# Patient Record
Sex: FEMALE | Race: WHITE | NOT HISPANIC OR LATINO | Employment: UNEMPLOYED | URBAN - NONMETROPOLITAN AREA
[De-identification: names, ages, dates, MRNs, and addresses within clinical notes are randomized per-mention and may not be internally consistent; named-entity substitution may affect disease eponyms.]

---

## 2024-02-23 PROBLEM — K90.9 IRON MALABSORPTION: Status: ACTIVE | Noted: 2024-02-23

## 2024-02-23 PROBLEM — D50.8 IRON DEFICIENCY ANEMIA SECONDARY TO INADEQUATE DIETARY IRON INTAKE: Status: ACTIVE | Noted: 2024-02-23

## 2024-02-23 PROBLEM — D50.8 IRON DEFICIENCY ANEMIA SECONDARY TO INADEQUATE DIETARY IRON INTAKE: Status: ACTIVE | Noted: 2023-12-08

## 2024-02-26 ENCOUNTER — TELEPHONE (OUTPATIENT)
Dept: CARDIOLOGY | Facility: CLINIC | Age: 44
End: 2024-02-26
Payer: COMMERCIAL

## 2024-02-26 NOTE — TELEPHONE ENCOUNTER
----- Message from BENY Ambrose sent at 2/26/2024  3:32 PM EST -----  Please let her know there was NO significant coronary artery disease noted to explain her complaints of chest pain. Here calcium score was also low. Additionally there was no major abnormality noted within her lungs. Her chest pain complaints should be evaluated by family doctor for another cause.

## 2024-02-27 RX ORDER — SODIUM PICOSULFATE, MAGNESIUM OXIDE, AND ANHYDROUS CITRIC ACID 10; 3.5; 12 MG/160ML; G/160ML; G/160ML
350 LIQUID ORAL TAKE AS DIRECTED
Qty: 350 ML | Refills: 0 | Status: SHIPPED | OUTPATIENT
Start: 2024-02-27

## 2024-02-27 NOTE — TELEPHONE ENCOUNTER
Caller: Gina Tiwari    Relationship: Self    Best call back number: [phone]    Requested Prescriptions:   Requested Prescriptions     Pending Prescriptions Disp Refills    norethindrone (AYGESTIN) 5 MG tablet       Sig: Take 1 tablet by mouth Daily.        Pharmacy where request should be sent: [facility] [address] [place] [address] [phone] [facility] [phone]      Last office visit with prescribing clinician: 11/20/2023   Last telemedicine visit with prescribing clinician: 9/5/2023   Next office visit with prescribing clinician: 3/21/2024     Additional details provided by patient: PT NEEDS 1 30 DAY REFILL CALLED INTO PHARMACY. HER NEW OBGYN WILL REFILL THEM BUT HER APPT IS NOT UNTIL 3/4/2024. SHE WILL RUN OUT BEFORE THEN AND WAS WANTING TO SEE IF YOU WOULD GET HER 1 30 DAY REFILL    Does the patient have less than a 3 day supply:  [x] Yes  [] No    Would you like a call back once the refill request has been completed: [x] Yes [] No    If the office needs to give you a call back, can they leave a voicemail: [] Yes [] No    Galina Burger Rep   02/27/24 15:41 EST

## 2024-03-01 ENCOUNTER — HOSPITAL ENCOUNTER (OUTPATIENT)
Dept: ONCOLOGY | Facility: HOSPITAL | Age: 44
Discharge: HOME | End: 2024-03-01
Payer: COMMERCIAL

## 2024-03-01 ENCOUNTER — PRE-ADMISSION TESTING (OUTPATIENT)
Dept: PREADMISSION TESTING | Facility: HOSPITAL | Age: 44
End: 2024-03-01
Payer: COMMERCIAL

## 2024-03-01 VITALS — HEIGHT: 64 IN | WEIGHT: 293 LBS | BODY MASS INDEX: 50.02 KG/M2

## 2024-03-01 VITALS
SYSTOLIC BLOOD PRESSURE: 112 MMHG | HEART RATE: 91 BPM | DIASTOLIC BLOOD PRESSURE: 55 MMHG | BODY MASS INDEX: 50.02 KG/M2 | WEIGHT: 293 LBS | TEMPERATURE: 97.2 F | HEIGHT: 64 IN | RESPIRATION RATE: 16 BRPM

## 2024-03-01 DIAGNOSIS — D50.8 IRON DEFICIENCY ANEMIA SECONDARY TO INADEQUATE DIETARY IRON INTAKE: ICD-10-CM

## 2024-03-01 DIAGNOSIS — K90.9 IRON MALABSORPTION: Primary | ICD-10-CM

## 2024-03-01 LAB
DEPRECATED RDW RBC AUTO: 55.3 FL (ref 37–54)
ERYTHROCYTE [DISTWIDTH] IN BLOOD BY AUTOMATED COUNT: 19 % (ref 12.3–15.4)
HCT VFR BLD AUTO: 35.6 % (ref 34–46.6)
HGB BLD-MCNC: 11.1 G/DL (ref 12–15.9)
MCH RBC QN AUTO: 25.1 PG (ref 26.6–33)
MCHC RBC AUTO-ENTMCNC: 31.2 G/DL (ref 31.5–35.7)
MCV RBC AUTO: 80.5 FL (ref 79–97)
PLATELET # BLD AUTO: 381 10*3/MM3 (ref 140–450)
PMV BLD AUTO: 8.7 FL (ref 6–12)
POTASSIUM SERPL-SCNC: 3.8 MMOL/L (ref 3.5–5.2)
RBC # BLD AUTO: 4.42 10*6/MM3 (ref 3.77–5.28)
WBC NRBC COR # BLD AUTO: 8.95 10*3/MM3 (ref 3.4–10.8)

## 2024-03-01 PROCEDURE — 96374 THER/PROPH/DIAG INJ IV PUSH: CPT

## 2024-03-01 PROCEDURE — 25810000003 SODIUM CHLORIDE 0.9 % SOLUTION: Performed by: INTERNAL MEDICINE

## 2024-03-01 PROCEDURE — 85027 COMPLETE CBC AUTOMATED: CPT

## 2024-03-01 PROCEDURE — 25010000002 IRON SUCROSE PER 1 MG: Performed by: INTERNAL MEDICINE

## 2024-03-01 PROCEDURE — 36415 COLL VENOUS BLD VENIPUNCTURE: CPT

## 2024-03-01 PROCEDURE — 84132 ASSAY OF SERUM POTASSIUM: CPT

## 2024-03-01 RX ORDER — SODIUM CHLORIDE 9 MG/ML
20 INJECTION, SOLUTION INTRAVENOUS ONCE
Status: COMPLETED | OUTPATIENT
Start: 2024-03-01 | End: 2024-03-01

## 2024-03-01 RX ADMIN — SODIUM CHLORIDE 20 ML/HR: 9 INJECTION, SOLUTION INTRAVENOUS at 13:05

## 2024-03-01 RX ADMIN — IRON SUCROSE 200 MG: 20 INJECTION, SOLUTION INTRAVENOUS at 13:05

## 2024-03-01 NOTE — PAT
Patient did not review general PAT education video as instructed in their preoperative information received from their surgeon.  One-on-one Pre Admission Testing general education provided during PAT visit.  Copies of PAT general education handouts (Incentive Spirometry, Meds to Beds Program, Patient Belongings, Pre-op skin preparation instructions, Blood Glucose testing, Visitor policy, Surgery FAQ, Code H) distributed to patient. Encouraged patient/family to read PAT general education handouts thoroughly and notify PAT staff with any questions or concerns. Patient instructed to bring PAT pass and completed skin prep sheet (if applicable) on the day of procedure. Patient verbalized understanding of all information and priority content.     Per Anesthesia Request, patient instructed not to take their ACE/ARB medications on the AM of surgery.    An arrival time for procedure was not provided during PAT visit. If patient had any questions or concerns about their arrival time, they were instructed to contact their surgeon/physician.  Additionally, if the patient referred to an arrival time that was acquired from their my chart account, patient was encouraged to verify that time with their surgeon/physician. Arrival times are NOT provided in Pre Admission Testing Department.

## 2024-03-04 ENCOUNTER — ANESTHESIA EVENT (OUTPATIENT)
Dept: GASTROENTEROLOGY | Facility: HOSPITAL | Age: 44
End: 2024-03-04
Payer: COMMERCIAL

## 2024-03-05 ENCOUNTER — HOSPITAL ENCOUNTER (OUTPATIENT)
Facility: HOSPITAL | Age: 44
Setting detail: HOSPITAL OUTPATIENT SURGERY
Discharge: HOME | End: 2024-03-05
Attending: INTERNAL MEDICINE | Admitting: INTERNAL MEDICINE
Payer: COMMERCIAL

## 2024-03-05 ENCOUNTER — ANESTHESIA (OUTPATIENT)
Dept: GASTROENTEROLOGY | Facility: HOSPITAL | Age: 44
End: 2024-03-05
Payer: COMMERCIAL

## 2024-03-05 VITALS
DIASTOLIC BLOOD PRESSURE: 63 MMHG | WEIGHT: 293 LBS | RESPIRATION RATE: 20 BRPM | BODY MASS INDEX: 50.02 KG/M2 | TEMPERATURE: 97.2 F | HEIGHT: 64 IN | OXYGEN SATURATION: 96 % | HEART RATE: 80 BPM | SYSTOLIC BLOOD PRESSURE: 116 MMHG

## 2024-03-05 DIAGNOSIS — D50.9 IRON DEFICIENCY ANEMIA, UNSPECIFIED IRON DEFICIENCY ANEMIA TYPE: ICD-10-CM

## 2024-03-05 LAB — GLUCOSE BLDC GLUCOMTR-MCNC: 121 MG/DL (ref 70–130)

## 2024-03-05 PROCEDURE — 82948 REAGENT STRIP/BLOOD GLUCOSE: CPT

## 2024-03-05 PROCEDURE — 25010000002 PROPOFOL 10 MG/ML EMULSION: Performed by: NURSE ANESTHETIST, CERTIFIED REGISTERED

## 2024-03-05 PROCEDURE — 88305 TISSUE EXAM BY PATHOLOGIST: CPT | Performed by: INTERNAL MEDICINE

## 2024-03-05 PROCEDURE — 25810000003 LACTATED RINGERS PER 1000 ML: Performed by: ANESTHESIOLOGY

## 2024-03-05 RX ORDER — SODIUM CHLORIDE, SODIUM LACTATE, POTASSIUM CHLORIDE, CALCIUM CHLORIDE 600; 310; 30; 20 MG/100ML; MG/100ML; MG/100ML; MG/100ML
9 INJECTION, SOLUTION INTRAVENOUS CONTINUOUS
Status: DISCONTINUED | OUTPATIENT
Start: 2024-03-05 | End: 2024-03-05 | Stop reason: HOSPADM

## 2024-03-05 RX ORDER — FAMOTIDINE 10 MG/ML
20 INJECTION, SOLUTION INTRAVENOUS ONCE
Status: COMPLETED | OUTPATIENT
Start: 2024-03-05 | End: 2024-03-05

## 2024-03-05 RX ORDER — FENTANYL CITRATE 50 UG/ML
50 INJECTION, SOLUTION INTRAMUSCULAR; INTRAVENOUS
Status: DISCONTINUED | OUTPATIENT
Start: 2024-03-05 | End: 2024-03-05 | Stop reason: HOSPADM

## 2024-03-05 RX ORDER — FAMOTIDINE 20 MG/1
20 TABLET, FILM COATED ORAL ONCE
Status: DISCONTINUED | OUTPATIENT
Start: 2024-03-05 | End: 2024-03-05 | Stop reason: HOSPADM

## 2024-03-05 RX ORDER — SODIUM CHLORIDE 9 MG/ML
40 INJECTION, SOLUTION INTRAVENOUS AS NEEDED
Status: DISCONTINUED | OUTPATIENT
Start: 2024-03-05 | End: 2024-03-05 | Stop reason: HOSPADM

## 2024-03-05 RX ORDER — PROPOFOL 10 MG/ML
VIAL (ML) INTRAVENOUS AS NEEDED
Status: DISCONTINUED | OUTPATIENT
Start: 2024-03-05 | End: 2024-03-05 | Stop reason: SURG

## 2024-03-05 RX ORDER — LIDOCAINE HYDROCHLORIDE 10 MG/ML
INJECTION, SOLUTION EPIDURAL; INFILTRATION; INTRACAUDAL; PERINEURAL AS NEEDED
Status: DISCONTINUED | OUTPATIENT
Start: 2024-03-05 | End: 2024-03-05 | Stop reason: SURG

## 2024-03-05 RX ORDER — LIDOCAINE HYDROCHLORIDE 10 MG/ML
0.5 INJECTION, SOLUTION EPIDURAL; INFILTRATION; INTRACAUDAL; PERINEURAL ONCE AS NEEDED
Status: DISCONTINUED | OUTPATIENT
Start: 2024-03-05 | End: 2024-03-05 | Stop reason: HOSPADM

## 2024-03-05 RX ORDER — IPRATROPIUM BROMIDE AND ALBUTEROL SULFATE 2.5; .5 MG/3ML; MG/3ML
3 SOLUTION RESPIRATORY (INHALATION) ONCE AS NEEDED
Status: DISCONTINUED | OUTPATIENT
Start: 2024-03-05 | End: 2024-03-05 | Stop reason: HOSPADM

## 2024-03-05 RX ORDER — ONDANSETRON 2 MG/ML
4 INJECTION INTRAMUSCULAR; INTRAVENOUS ONCE AS NEEDED
Status: DISCONTINUED | OUTPATIENT
Start: 2024-03-05 | End: 2024-03-05 | Stop reason: HOSPADM

## 2024-03-05 RX ORDER — FENTANYL CITRATE 50 UG/ML
50 INJECTION, SOLUTION INTRAMUSCULAR; INTRAVENOUS
Status: DISCONTINUED | OUTPATIENT
Start: 2024-03-05 | End: 2024-03-05 | Stop reason: SDUPTHER

## 2024-03-05 RX ORDER — SODIUM CHLORIDE 0.9 % (FLUSH) 0.9 %
10 SYRINGE (ML) INJECTION AS NEEDED
Status: DISCONTINUED | OUTPATIENT
Start: 2024-03-05 | End: 2024-03-05 | Stop reason: HOSPADM

## 2024-03-05 RX ORDER — SODIUM CHLORIDE 0.9 % (FLUSH) 0.9 %
10 SYRINGE (ML) INJECTION EVERY 12 HOURS SCHEDULED
Status: DISCONTINUED | OUTPATIENT
Start: 2024-03-05 | End: 2024-03-05 | Stop reason: HOSPADM

## 2024-03-05 RX ORDER — MIDAZOLAM HYDROCHLORIDE 1 MG/ML
1 INJECTION INTRAMUSCULAR; INTRAVENOUS
Status: DISCONTINUED | OUTPATIENT
Start: 2024-03-05 | End: 2024-03-05 | Stop reason: HOSPADM

## 2024-03-05 RX ORDER — HYDROMORPHONE HYDROCHLORIDE 1 MG/ML
0.5 INJECTION, SOLUTION INTRAMUSCULAR; INTRAVENOUS; SUBCUTANEOUS
Status: DISCONTINUED | OUTPATIENT
Start: 2024-03-05 | End: 2024-03-05 | Stop reason: SDUPTHER

## 2024-03-05 RX ORDER — HYDROMORPHONE HYDROCHLORIDE 1 MG/ML
0.5 INJECTION, SOLUTION INTRAMUSCULAR; INTRAVENOUS; SUBCUTANEOUS
Status: DISCONTINUED | OUTPATIENT
Start: 2024-03-05 | End: 2024-03-05 | Stop reason: HOSPADM

## 2024-03-05 RX ADMIN — PROPOFOL 50 MG: 10 INJECTION, EMULSION INTRAVENOUS at 10:19

## 2024-03-05 RX ADMIN — PROPOFOL 100 MCG/KG/MIN: 10 INJECTION, EMULSION INTRAVENOUS at 10:18

## 2024-03-05 RX ADMIN — SODIUM CHLORIDE, POTASSIUM CHLORIDE, SODIUM LACTATE AND CALCIUM CHLORIDE 9 ML/HR: 600; 310; 30; 20 INJECTION, SOLUTION INTRAVENOUS at 09:40

## 2024-03-05 RX ADMIN — Medication 10 ML: at 09:44

## 2024-03-05 RX ADMIN — LIDOCAINE HYDROCHLORIDE 100 MG: 10 INJECTION, SOLUTION EPIDURAL; INFILTRATION; INTRACAUDAL; PERINEURAL at 10:17

## 2024-03-05 RX ADMIN — PROPOFOL 150 MG: 10 INJECTION, EMULSION INTRAVENOUS at 10:17

## 2024-03-05 RX ADMIN — FAMOTIDINE 20 MG: 10 INJECTION INTRAVENOUS at 09:45

## 2024-03-05 NOTE — ANESTHESIA PREPROCEDURE EVALUATION
Anesthesia Evaluation     Patient summary reviewed and Nursing notes reviewed   no history of anesthetic complications:   NPO Solid Status: > 8 hours  NPO Liquid Status: > 2 hours           Airway   Mallampati: II  TM distance: >3 FB  Neck ROM: full  No difficulty expected  Dental - normal exam     Pulmonary - normal exam    breath sounds clear to auscultation  (+) a smoker (1ppd) Current, asthma,sleep apnea on CPAP  Cardiovascular - normal exam    ECG reviewed  Rhythm: regular  Rate: normal    (+) hypertension    ROS comment: 1/23 Stress Echo:  ·  Lexiscan stress test completed without complications  ·  The test is negative for typical angina or ischemic ST segment changes.  The patient reported constant left-sided dull ache which resolved in recovery.  ·  Diaphragmatic attenuation and GI artifacts are present.  ·  Myocardial perfusion imaging indicates a normal myocardial perfusion study with no evidence of ischemia.  ·  Left ventricular ejection fraction is hyperdynamic (Calculated EF > 70%).  ·  Impressions are consistent with a low risk study.      Neuro/Psych- negative ROS  GI/Hepatic/Renal/Endo    (+) morbid obesity, GERD, diabetes mellitus type 2, thyroid problem hypothyroidism    Musculoskeletal     Abdominal    Substance History      OB/GYN          Other - negative ROS                   Anesthesia Plan    ASA 3     general     intravenous induction     Anesthetic plan, risks, benefits, and alternatives have been provided, discussed and informed consent has been obtained with: patient.    Plan discussed with CRNA.    CODE STATUS:

## 2024-03-05 NOTE — ANESTHESIA POSTPROCEDURE EVALUATION
Patient: Gina Loomis    Procedure Summary       Date: 03/05/24 Room / Location: [facility] ENDOSCOPY 2 /  [facility]    Anesthesia Start: 1014 Anesthesia Stop: 1054    Procedures:       ESOPHAGOGASTRODUODENOSCOPY      COLONOSCOPY Diagnosis:       Iron deficiency anemia, unspecified iron deficiency anemia type      (Iron deficiency anemia, unspecified iron deficiency anemia type [D50.9])    Surgeons: Ronald Reyes MD Provider: Richardson Saucedo MD    Anesthesia Type: general ASA Status: 3            Anesthesia Type: general    Vitals  No vitals data found for the desired time range.          Post Anesthesia Care and Evaluation    Patient location during evaluation: PACU  Patient participation: complete - patient participated  Level of consciousness: awake  Pain score: 0  Pain management: adequate    Airway patency: patent  Anesthetic complications: No anesthetic complications  PONV Status: none  Cardiovascular status: acceptable and stable  Respiratory status: nasal cannula, unassisted, acceptable and spontaneous ventilation  Hydration status: acceptable

## 2024-03-05 NOTE — H&P
[facility]    Referring Provider: Ronald Reyes MD    Reason for Consultation: here for egd/colonoscopy    Chief complaint here for egd/colonoscopy    History of present illness:  Gina Loomis is a 43 y.o. female who presents to inpatient endoscopy for egd/colonoscopy. Referred for anemia. No overt gib loss.    Allergies:  Lisinopril, Claritin-d 24 hour [loratadine-pseudoephedrine er], and Morphine    Scheduled Meds:       Infusions:  No current facility-administered medications for this encounter.      PRN Meds:      Home Meds:  Medications Prior to Admission   Medication Sig Dispense Refill Last Dose    albuterol sulfate  (90 Base) MCG/ACT inhaler Inhale 2 puffs Every 4 (Four) Hours As Needed for Wheezing. 18 g 5     azithromycin (ZITHROMAX) 250 MG tablet Take 1 tablet by mouth See Admin Instructions. Take 1 tablet by mouth on Monday, Wednesday, and Friday for prevention of bronchitis       diclofenac (VOLTAREN) 50 MG EC tablet Take 1 tablet by mouth 2 (Two) Times a Day. 60 tablet 5     ferrous gluconate (FERGON) 324 MG tablet Take 1 tablet by mouth Daily With Breakfast. 30 tablet 2     fluconazole (Diflucan) 150 MG tablet Take 1 tablet by mouth Every Other Day. (Patient taking differently: Take 1 tablet by mouth Every Other Day. Last dose will Chema 3-3-2024) 2 tablet 0     FLUoxetine (PROzac) 20 MG capsule TAKE 3 CAPSULES BY MOUTH ONCE DAILY 90 capsule 0     hydrOXYzine (ATARAX) 50 MG tablet Take 1 tablet by mouth 2 (Two) Times a Day As Needed for Anxiety.       icosapent ethyl (Vascepa) 1 g capsule capsule Take 2 g by mouth 2 (Two) Times a Day With Meals. 120 capsule 1     levothyroxine (SYNTHROID, LEVOTHROID) 50 MCG tablet Take 1 tablet by mouth Daily. 90 tablet 1     losartan (COZAAR) 25 MG tablet Take 1 tablet by mouth Daily.       metFORMIN (GLUCOPHAGE) 1000 MG tablet Take 1 tablet by mouth 2 (Two) Times a Day With Meals. 180 tablet 1     montelukast (SINGULAIR) 10 MG tablet  Take 1 tablet by mouth Every Night. 90 tablet 1     NIFEdipine CC (ADALAT CC) 90 MG 24 hr tablet Take 1 tablet by mouth Daily. 90 tablet 1     norethindrone (AYGESTIN) 5 MG tablet Take 1 tablet by mouth Daily. 30 tablet 0     omeprazole (priLOSEC) 20 MG capsule Take 1 capsule by mouth 2 (Two) Times a Day. 60 capsule 5     ondansetron ODT (ZOFRAN-ODT) 4 MG disintegrating tablet 1 tablet Every 8 (Eight) Hours As Needed for Nausea or Vomiting.       propranolol (INDERAL) 20 MG tablet Take 1 tablet by mouth 2 (Two) Times a Day. 180 tablet 2     rosuvastatin (CRESTOR) 10 MG tablet Take 1 tablet by mouth Every Night. 90 tablet 1     Sod Picosulfate-Mag Ox-Cit Acd (Clenpiq) 10-3.5-12 MG-GM -GM/160ML solution Take 350 mL by mouth Take As Directed. 350 mL 0     Symbicort 160-4.5 MCG/ACT inhaler Inhale 2 puffs 2 (Two) Times a Day.       triamterene-hydrochlorothiazide (MAXZIDE-25) 37.5-25 MG per tablet Take 1 tablet by mouth Daily. 30 tablet 5        ROS: Review of Systems  All other systems reviewed and are negative.    PAST MED HX: Pt  has a past medical history of Anemia, Anxiety state (2010), Anxiety syndrome, Asthma, Bell's palsy, Carpal tunnel syndrome, Chronic bronchitis, CTS (carpal tunnel syndrome), Depression, Diabetes mellitus, Dizziness, Fracture, GERD (gastroesophageal reflux disease) (2010), Headache, tension-type, HL (hearing loss), Hyperlipidemia (2013), Hypertension, Hypothyroidism (2013), Iron deficiency anemia secondary to inadequate dietary iron intake (2023), Kidney infection, Memory loss, Memory loss, Obesity, Preeclampsia (), Shingles, Sleep apnea (12), Thyroid disease, Trigeminal neuralgia, Upper respiratory infection (2018), and Weakness.  PAST SURG HX: Pt  has a past surgical history that includes Carpal tunnel release (Left, 2015); Carpal tunnel release (Right, 08/15/2008);  section; Laparoscopic tubal ligation (); hysteroscopy  endometrial ablation tubal sterilization (2020); and Hernia repair (2021).  FAM HX: family history includes Coronary artery disease in her father and mother; Diabetes in her father, maternal grandmother, paternal grandmother, and sister; Heart disease in her father and mother; High cholesterol in her father and mother; Hyperlipidemia in her father; Hypertension in her father and mother; Kidney failure in her father; Lymphoma in her maternal grandmother; Obesity in her maternal grandmother.  SOC HX: Pt  reports that she has been smoking cigarettes. She started smoking about 31 years ago. She has a 31.2 pack-year smoking history. She has never used smokeless tobacco. She reports that she does not currently use alcohol. She reports that she does not use drugs.    There were no vitals taken for this visit.    Physical Exam  Wt Readings from Last 3 Encounters:   03/01/24 (!) 140 kg (308 lb)   03/01/24 (!) 141 kg (311 lb 8.2 oz)   02/23/24 (!) 139 kg (307 lb)   ,body mass index is unknown because there is no height or weight on file.    General Appearance:  Vitals as above. no acute distress  Head/face:  Normocephalic, atraumatic  Eyes:   EOMI, no conjunctivitis or icterus   Nose/Sinuses:  Nares patent bilaterally without discharge or lesions  Mouth/Throat:  Normal oral movements without dyskinesia  Neck:  trachea is midline, no thyromegaly  Lungs:  Normal work of breathing effort, no overt rales  Heart:  Regular rate, no overt palpable thrill or grade VI M  Abdomen:  Nondistended, no guarding or rebound tenderness  Neurologic:  Alert; no focal deficits; age appropriate behavior and speech  Psychiatric: mood and affect are congruent  Vascular: extremities without edema  Skin: no rash or cyanosis.          Results Review:   I reviewed the patient's new clinical results.    Lab Results   Component Value Date    WBC 8.95 03/01/2024    HGB 11.1 (L) 03/01/2024    HCT 35.6 03/01/2024    MCV 80.5 03/01/2024     03/01/2024        Lab Results   Component Value Date    GLUCOSE 238 (H) 10/30/2023    BUN 11 10/30/2023    CREATININE 0.70 02/23/2024    BCR 18 10/30/2023    CO2 23 10/30/2023    CALCIUM 9.4 10/30/2023    PROTENTOTREF 7.0 10/30/2023    ALBUMIN 4.4 10/30/2023    LABIL2 1.7 10/30/2023    AST 17 10/30/2023    ALT 17 10/30/2023       ASSESSMENTS/PLANS  1.) Anemia  To egd/colonoscopy  The risk of endoscopy was discussed including the risk of anesthesia, bowel perforation, bleeding, missed lesion, infection, and death should a severe complication occur.  The patient determined that the diagnostic benefit outweighed the risk.             I discussed the patient's findings and my recommendations with the patient    Ronald Reyes MD  03/05/24  09:13 EST

## 2024-03-06 LAB
CYTO UR: NORMAL
LAB AP CASE REPORT: NORMAL
LAB AP CLINICAL INFORMATION: NORMAL
PATH REPORT.FINAL DX SPEC: NORMAL
PATH REPORT.GROSS SPEC: NORMAL

## 2024-03-07 DIAGNOSIS — K90.9 IRON MALABSORPTION: ICD-10-CM

## 2024-03-07 DIAGNOSIS — D50.8 IRON DEFICIENCY ANEMIA SECONDARY TO INADEQUATE DIETARY IRON INTAKE: Primary | ICD-10-CM

## 2024-03-07 RX ORDER — SODIUM CHLORIDE 9 MG/ML
20 INJECTION, SOLUTION INTRAVENOUS ONCE
Status: CANCELLED | OUTPATIENT
Start: 2024-03-08

## 2024-03-08 ENCOUNTER — TELEPHONE (OUTPATIENT)
Dept: GASTROENTEROLOGY | Facility: CLINIC | Age: 44
End: 2024-03-08
Payer: COMMERCIAL

## 2024-03-08 ENCOUNTER — HOSPITAL ENCOUNTER (OUTPATIENT)
Dept: ONCOLOGY | Facility: HOSPITAL | Age: 44
Discharge: HOME | End: 2024-03-08
Payer: COMMERCIAL

## 2024-03-08 VITALS
BODY MASS INDEX: 50.02 KG/M2 | TEMPERATURE: 97.4 F | SYSTOLIC BLOOD PRESSURE: 126 MMHG | HEART RATE: 92 BPM | RESPIRATION RATE: 18 BRPM | DIASTOLIC BLOOD PRESSURE: 60 MMHG | HEIGHT: 64 IN | WEIGHT: 293 LBS

## 2024-03-08 DIAGNOSIS — K90.9 IRON MALABSORPTION: ICD-10-CM

## 2024-03-08 DIAGNOSIS — D50.8 IRON DEFICIENCY ANEMIA SECONDARY TO INADEQUATE DIETARY IRON INTAKE: Primary | ICD-10-CM

## 2024-03-08 PROCEDURE — 25010000002 IRON SUCROSE PER 1 MG: Performed by: INTERNAL MEDICINE

## 2024-03-08 PROCEDURE — 96375 TX/PRO/DX INJ NEW DRUG ADDON: CPT

## 2024-03-08 PROCEDURE — 25810000003 SODIUM CHLORIDE 0.9 % SOLUTION: Performed by: INTERNAL MEDICINE

## 2024-03-08 RX ORDER — METRONIDAZOLE 250 MG/1
250 TABLET ORAL 2 TIMES DAILY
COMMUNITY

## 2024-03-08 RX ORDER — SODIUM CHLORIDE 9 MG/ML
20 INJECTION, SOLUTION INTRAVENOUS ONCE
Status: COMPLETED | OUTPATIENT
Start: 2024-03-08 | End: 2024-03-08

## 2024-03-08 RX ADMIN — SODIUM CHLORIDE 20 ML/HR: 9 INJECTION, SOLUTION INTRAVENOUS at 10:16

## 2024-03-08 RX ADMIN — IRON SUCROSE 200 MG: 20 INJECTION, SOLUTION INTRAVENOUS at 10:16

## 2024-03-08 NOTE — TELEPHONE ENCOUNTER
DR PERKINS,    MS SOHA CALLED THIS MORNING. PATIENT HAD EGD ON 3/5/2024. LEFT SIDE OF HER JAW BONE IS SORE.

## 2024-03-14 DIAGNOSIS — E78.2 MIXED HYPERLIPIDEMIA: ICD-10-CM

## 2024-03-14 RX ORDER — ICOSAPENT ETHYL 1000 MG/1
2 CAPSULE ORAL 2 TIMES DAILY WITH MEALS
Qty: 120 CAPSULE | Refills: 0 | Status: SHIPPED | OUTPATIENT
Start: 2024-03-14

## 2024-03-14 NOTE — TELEPHONE ENCOUNTER
Caller: Gina Loomis    Relationship: Self    Best call back number: [phone]    Requested Prescriptions:   Requested Prescriptions     Pending Prescriptions Disp Refills    icosapent ethyl (Vascepa) 1 g capsule capsule 120 capsule 1     Sig: Take 2 g by mouth 2 (Two) Times a Day With Meals.        Pharmacy where request should be sent: [facility] [address] [place] [address] [phone] [facility] [phone]      Last office visit with prescribing clinician: Visit date not found   Last telemedicine visit with prescribing clinician: Visit date not found   Next office visit with prescribing clinician: 3/20/2024     Additional details provided by patient: PREVIOUS PATIENT OF DIANA STOVALL. HAD TO RESCHEDULE HER APPOINTMENT FOR TODAY DUE TO VERTIGO. IS COMPLETELY OUT OF THIS MEDICATION.     Does the patient have less than a 3 day supply:  [x] Yes  [] No    Would you like a call back once the refill request has been completed: [] Yes [] No    If the office needs to give you a call back, can they leave a voicemail: [] Yes [] No    Galina Argueta Rep   03/14/24 12:11 EDT

## 2024-03-15 ENCOUNTER — HOSPITAL ENCOUNTER (OUTPATIENT)
Dept: ONCOLOGY | Facility: HOSPITAL | Age: 44
Discharge: HOME | End: 2024-03-15
Payer: COMMERCIAL

## 2024-03-15 VITALS
HEART RATE: 82 BPM | HEIGHT: 64 IN | TEMPERATURE: 96.9 F | SYSTOLIC BLOOD PRESSURE: 141 MMHG | BODY MASS INDEX: 50.02 KG/M2 | WEIGHT: 293 LBS | RESPIRATION RATE: 16 BRPM | DIASTOLIC BLOOD PRESSURE: 69 MMHG

## 2024-03-15 DIAGNOSIS — D50.8 IRON DEFICIENCY ANEMIA SECONDARY TO INADEQUATE DIETARY IRON INTAKE: Primary | ICD-10-CM

## 2024-03-15 DIAGNOSIS — K90.9 IRON MALABSORPTION: ICD-10-CM

## 2024-03-15 PROCEDURE — 25010000002 IRON SUCROSE PER 1 MG: Performed by: INTERNAL MEDICINE

## 2024-03-15 PROCEDURE — 96374 THER/PROPH/DIAG INJ IV PUSH: CPT

## 2024-03-15 PROCEDURE — 25810000003 SODIUM CHLORIDE 0.9 % SOLUTION: Performed by: INTERNAL MEDICINE

## 2024-03-15 RX ORDER — SODIUM CHLORIDE 9 MG/ML
20 INJECTION, SOLUTION INTRAVENOUS ONCE
Status: COMPLETED | OUTPATIENT
Start: 2024-03-15 | End: 2024-03-15

## 2024-03-15 RX ADMIN — IRON SUCROSE 200 MG: 20 INJECTION, SOLUTION INTRAVENOUS at 13:36

## 2024-03-15 RX ADMIN — SODIUM CHLORIDE 20 ML/HR: 9 INJECTION, SOLUTION INTRAVENOUS at 13:36

## 2024-03-20 ENCOUNTER — OFFICE VISIT (OUTPATIENT)
Dept: CARDIOLOGY | Facility: CLINIC | Age: 44
End: 2024-03-20
Payer: COMMERCIAL

## 2024-03-20 VITALS
SYSTOLIC BLOOD PRESSURE: 125 MMHG | DIASTOLIC BLOOD PRESSURE: 79 MMHG | RESPIRATION RATE: 18 BRPM | HEIGHT: 64 IN | WEIGHT: 293 LBS | HEART RATE: 91 BPM | BODY MASS INDEX: 50.02 KG/M2 | OXYGEN SATURATION: 98 %

## 2024-03-20 DIAGNOSIS — E78.2 MIXED HYPERLIPIDEMIA: ICD-10-CM

## 2024-03-20 DIAGNOSIS — I10 ESSENTIAL HYPERTENSION: ICD-10-CM

## 2024-03-20 DIAGNOSIS — Z72.0 TOBACCO USE: ICD-10-CM

## 2024-03-20 DIAGNOSIS — R06.09 DYSPNEA ON EXERTION: ICD-10-CM

## 2024-03-20 DIAGNOSIS — R07.89 CHEST PRESSURE: Primary | ICD-10-CM

## 2024-03-20 PROCEDURE — 99214 OFFICE O/P EST MOD 30 MIN: CPT | Performed by: INTERNAL MEDICINE

## 2024-03-20 RX ORDER — CYCLOBENZAPRINE HCL 5 MG
5 TABLET ORAL 2 TIMES DAILY PRN
COMMUNITY
Start: 2024-03-14 | End: 2024-03-29

## 2024-03-20 RX ORDER — CARVEDILOL 6.25 MG/1
6.25 TABLET ORAL 2 TIMES DAILY
Qty: 180 TABLET | Refills: 3 | Status: SHIPPED | OUTPATIENT
Start: 2024-03-20 | End: 2025-03-20

## 2024-03-20 RX ORDER — MECLIZINE HYDROCHLORIDE 25 MG/1
12.5 TABLET ORAL 3 TIMES DAILY PRN
COMMUNITY
Start: 2024-03-14

## 2024-03-20 NOTE — ASSESSMENT & PLAN NOTE
Tobacco use discussed extensively patient smoking 2 packs/day.  Explained to patient can bridge herself with lozenges.  Patient also can practice a slow wean by 1 cigarette/day every week.  Patient agreeable.  Follow-up on smoking status next visit.

## 2024-03-20 NOTE — ASSESSMENT & PLAN NOTE
Workup is negative with stress test and coronary CTA.  Patient experienced chest tightness when she placed a nicotine patch on, possibly vasoactive microvascular disease/endothelial dysfunction. Plan:  Smoking cessation discussed extensively  Change propranolol 20 mg twice daily to carvedilol 6.25 mg twice daily for better blood pressure control and 24-hour coverage  Consider nitrates next visit  Consider decreasing the dose of calcium channel blockers for possible reflex tachycardia

## 2024-03-20 NOTE — PROGRESS NOTES
[facility] [place]  [facility] CARDIOLOGY  [address] [place] [address]  Dept: [phone]  Dept Fax: [phone]    Date: 03/20/2024  Patient: Gina Loomis  YOB: 1980    Follow Up Office Visit Note    Interval Follow-up  Ms. Gina Loomis is a 43 y.o. female who is here for follow-up on Chest Pain and Rapid Heart Rate.    Subjective   Patient still feeling some chest heaviness, intermittent, no particular timing, usually at rest.  Cardiac workup has been negative so far with a normal stress test negative coronary CTA and normal echocardiogram.  Patient smokes 2 packs/day.  Patient does not feel palpitations anymore, but her heart races with mild to moderate activity.  Patient denies orthopnea, PND, lightheadedness, syncope or medications side-effects.    The following portions of the patient's history were reviewed and updated as appropriate: allergies, current medications, past family history, past medical history, past social history, past surgical history, and problem list.    Medications:   Allergies   Allergen Reactions    Lisinopril Cough    Claritin-D 24 Hour [Loratadine-Pseudoephedrine Er] Other (See Comments)     Couldn't sleep    Morphine Itching      Current Outpatient Medications   Medication Instructions    albuterol sulfate  (90 Base) MCG/ACT inhaler 2 puffs, Inhalation, Every 4 Hours PRN    carvedilol (COREG) 6.25 mg, Oral, 2 Times Daily    cyclobenzaprine (FLEXERIL) 5 mg, Oral, 2 Times Daily PRN    diclofenac (VOLTAREN) 50 mg, Oral, 2 Times Daily    ferrous gluconate (FERGON) 324 mg, Oral, Daily With Breakfast    fluconazole (DIFLUCAN) 150 mg, Oral, Every Other Day    FLUoxetine (PROZAC) 60 mg, Oral, Daily    hydrOXYzine (ATARAX) 50 mg, Oral, 2 Times Daily PRN    icosapent ethyl (VASCEPA) 2 g, Oral, 2 Times Daily With Meals    levothyroxine (SYNTHROID, LEVOTHROID) 50 mcg, Oral, Daily    losartan (COZAAR) 25 MG tablet 1 tablet, Oral,  "Daily    meclizine (ANTIVERT) 12.5 mg, Oral, 3 Times Daily PRN    metFORMIN (GLUCOPHAGE) 1,000 mg, Oral, 2 Times Daily With Meals    montelukast (SINGULAIR) 10 mg, Oral, Nightly    NIFEdipine CC (ADALAT CC) 90 mg, Oral, Daily    norethindrone (AYGESTIN) 5 mg, Oral, Daily    omeprazole (PRILOSEC) 20 mg, Oral, 2 Times Daily    ondansetron ODT (ZOFRAN-ODT) 4 mg, Every 8 Hours PRN    rosuvastatin (CRESTOR) 10 mg, Oral, Nightly    Sod Picosulfate-Mag Ox-Cit Acd (Clenpiq) 10-3.5-12 MG-GM -GM/160ML solution 350 mL, Oral, Take As Directed    Symbicort 160-4.5 MCG/ACT inhaler 2 puffs, Inhalation, 2 Times Daily    triamterene-hydrochlorothiazide (MAXZIDE-25) 37.5-25 MG per tablet 1 tablet, Oral, Daily       Tobacco Use: High Risk (3/20/2024)    Patient History     Smoking Tobacco Use: Every Day     Smokeless Tobacco Use: Never     Passive Exposure: Not on file        Objective  Vitals:    03/20/24 1500   BP: 125/79   BP Location: Left arm   Patient Position: Sitting   Cuff Size: Adult   Pulse: 91   Resp: 18   SpO2: 98%   Weight: (!) 139 kg (307 lb)   Height: 162.6 cm (64\")      Vitals:    03/20/24 1500   BP: 125/79   BP Location: Left arm   Patient Position: Sitting   Cuff Size: Adult   Pulse: 91   Resp: 18   SpO2: 98%   Weight: (!) 139 kg (307 lb)   Height: 162.6 cm (64\")          Physical Exam  Constitutional:       Appearance: Not in distress.   HENT:    Mouth/Throat:      Mouth: Mucous membranes are dry.   Neck:      Vascular: JVD normal.   Pulmonary:      Breath sounds: Rhonchi present. Rales present.   Cardiovascular:      Normal rate. Regular rhythm.      Murmurs: There is no murmur.   Pulses:     Intact distal pulses.   Edema:     Peripheral edema absent.   Neurological:      Mental Status: Alert.              Diagnostic Data  Lab Results   Component Value Date     10/30/2023    K 3.8 03/01/2024    CL 99 10/30/2023    CO2 23 10/30/2023    MG 1.9 10/30/2023    BUN 11 10/30/2023    CREATININE 0.70 02/23/2024    " "CALCIUM 9.4 10/30/2023    BILITOT <0.2 10/30/2023    ALKPHOS 56 10/30/2023    ALT 17 10/30/2023    AST 17 10/30/2023    GLUCOSE 238 (H) 10/30/2023    ALBUMIN 4.4 10/30/2023     Lab Results   Component Value Date    WBC 8.95 03/01/2024    HGB 11.1 (L) 03/01/2024    HCT 35.6 03/01/2024     03/01/2024     No results found for: \"APTT\", \"INR\", \"PTT\"  Lab Results   Component Value Date    TROPONINT 6 10/31/2023     Lab Results   Component Value Date    PROBNP 36 10/31/2023       Lab Results   Component Value Date    CHLPL 113 04/04/2023    TRIG 120 04/04/2023    HDL 40 04/04/2023    LDL 51 04/04/2023     Lab Results   Component Value Date    TSH 1.670 10/30/2023    FREET4 1.27 04/28/2022       CV Diagnostics:  Procedures    CXR: No results found for this or any previous visit.     ECHO/MUGA: Results for orders placed during the hospital encounter of 01/26/23    Adult Transthoracic Echo Complete W/ Cont if Necessary Per Protocol    Interpretation Summary    Left ventricular systolic function is normal. Estimated left ventricular EF = 55%    Trace mitral regurgitation.    Trace tricuspid regurgitation with normal RVSP.     STRESS TESTS: Results for orders placed during the hospital encounter of 01/26/23    Stress Test With Myocardial Perfusion One Day    Interpretation Summary    Lexiscan stress test completed without complications    The test is negative for typical angina or ischemic ST segment changes.  The patient reported constant left-sided dull ache which resolved in recovery.    Diaphragmatic attenuation and GI artifacts are present.    Myocardial perfusion imaging indicates a normal myocardial perfusion study with no evidence of ischemia.    Left ventricular ejection fraction is hyperdynamic (Calculated EF > 70%).    Impressions are consistent with a low risk study.     CARDIAC CATH: No results found for this or any previous visit.     DEVICES: No valid procedures specified.   HOLTER: Results for orders " placed in visit on 03/20/23    Holter Monitor - 48 Hour    Interpretation Summary    A normal monitor study.    Rare PAC`S and PVC`S    Max . Min Hr 76 and average     Complaints of skip beats correlates with single PAC`S    Heart racing and fatigue sinus rhythm and sinus tachycardia     CT/MRI:  Results for orders placed during the hospital encounter of 02/23/24    CT Angiogram Coronary    Narrative  CT ANGIOGRAM CORONARY    Date of Exam: 2/23/2024 2:28 PM EST    Indication: Chest pain/anginal equivalent, intermediate CAD risk, not treadmill candidate  chest pain continued complaints, smoker, diabetes mellitus.    Comparison: None available.    Technique: CTA of the chest was performed after the uneventful intravenous administration of 100 cc of Isovue-370 contrast. Reconstructed coronal and sagittal images were also obtained. In addition, a 3-D volume rendered image was created for  interpretation. Automated exposure control and iterative reconstruction methods were used.      Findings:  The visualized hilar and mediastinal regions are normal. The visualized pleural spaces are clear. There are bandlike linear densities in the lower lobes representing subsegmental atelectasis versus small pulmonary scars. There is degenerative spondylosis  in the visualized thoracic spine. There are no acute findings beneath the hemidiaphragms.    Impression  Impression:  No significant noncardiac CT findings.    Please refer to CT Angiogram Coronary   Cardiology interpretation report for information on cardiac structures.    Electronically Signed: Mark Murphy MD  2/25/2024 5:56 PM EST  Workstation ID: [id number]    VASCULAR: No valid procedures specified.     Assessment and Plan  Diagnoses and all orders for this visit:    1. Chest pressure (Primary)  Assessment & Plan:  Workup is negative with stress test and coronary CTA.  Patient experienced chest tightness when she placed a nicotine patch on, possibly vasoactive  microvascular disease/endothelial dysfunction. Plan:  Smoking cessation discussed extensively  Change propranolol 20 mg twice daily to carvedilol 6.25 mg twice daily for better blood pressure control and 24-hour coverage  Consider nitrates next visit  Consider decreasing the dose of calcium channel blockers for possible reflex tachycardia    Orders:  -     carvedilol (COREG) 6.25 MG tablet; Take 1 tablet by mouth 2 (Two) Times a Day.  Dispense: 180 tablet; Refill: 3    2. Mixed hyperlipidemia  Assessment & Plan:   Lipid abnormalities are improving with treatment    Plan:  Continue same medication/s without change.      Discussed medication dosage, use, side effects, and goals of treatment in detail.    Counseled patient on lifestyle modifications to help control hyperlipidemia.   Cholesterol lowering dietary information shared with patient.  Advised patient to exercise for 150 minutes weekly. (30 minute brisk walk, 5 days a week for example)  Weight Loss encouraged  Stop tobacco use encouraged  Patient counseled in regards to heart healthy, low fat/ low cholesterol/low sat diet, daily exercise for 30 minutes, low to moderate intensity, and weight loss.    Patient Treatment Goals:   LDL goal is less than 70    Followup in 1 year.      3. Dyspnea on exertion  Assessment & Plan:  Multifactorial.  Lung disease likely in the setting of smoking 2 packs/day, and exam suggestive of bronchitis.  No volume overload on exam.  Blood pressure controlled.  Deconditioning. Plan:  Patient counseled in regards to heart healthy, low fat/ low cholesterol/low sat diet, daily exercise for 30 minutes, low to moderate intensity, and weight loss.  Smoking cessation discussed extensively  Pulm referral as patient lost her pulmonologist  Blood pressure management as per hypertension    Orders:  -     proBNP; Future  -     Ambulatory Referral to Pulmonary/COPD Clinic    4. Essential hypertension  Assessment & Plan:  Hypertension is stable and  controlled  Medication changes per orders.  Dietary sodium restriction.  Weight loss.  Regular aerobic exercise.  Stop smoking.  Ambulatory blood pressure monitoring.  Blood pressure profile could be optimized.  Patient overall heart rate fast.  Dry mouth suggestive of volume depletion related to diuretics.  Stop Inderal and start carvedilol 6.25 mg 1 tab twice daily.  Plan to wean off nifedipine, decrease diuretics and uptitrate losartan.  Blood pressure will be reassessed  in 2 months .    Orders:  -     carvedilol (COREG) 6.25 MG tablet; Take 1 tablet by mouth 2 (Two) Times a Day.  Dispense: 180 tablet; Refill: 3    5. Tobacco use  Assessment & Plan:  Tobacco use discussed extensively patient smoking 2 packs/day.  Explained to patient can bridge herself with lozenges.  Patient also can practice a slow wean by 1 cigarette/day every week.  Patient agreeable.  Follow-up on smoking status next visit.    Orders:  -     Ambulatory Referral to Pulmonary/COPD Clinic         Return in about 2 months (around 5/20/2024).    Patient Instructions   [facility]  [facility] CARDIOLOGY  [address] [place] [address]  Dept: [phone]  Dept Fax: [phone]    Date:   Patient: Gina Loomis    Blood Pressure Log  Provided By: Trace Naylor MD    Date Blood Pressure Heart Rate Comments   Thursday March 21, 2024       Friday March 22, 2024       Saturday March 23, 2024       Chema March 24, 2024       Monday March 25, 2024       Tuesday March 26, 2024       Wednesday March 27, 2024       Thursday March 28, 2024       Friday March 29, 2024       Saturday March 30, 2024       Chema March 31, 2024       Monday April 1, 2024       Tuesday April 2, 2024       Wednesday April 3, 2024       Thursday April 4, 2024       Friday April 5, 2024       Saturday April 6, 2024       Chema April 7, 2024       Monday April 8, 2024       Tuesday April 9, 2024       Wednesday April 10, 2024        Thursday April 11, 2024       Friday April 12, 2024       Saturday April 13, 2024       Chema April 14, 2024       Monday April 15, 2024       Tuesday April 16, 2024       Wednesday April 17, 2024       Thursday April 18, 2024       Friday April 19, 2024       Saturday April 20, 2024       Chema April 21, 2024       Monday April 22, 2024       Tuesday April 23, 2024       Wednesday April 24, 2024       Thursday April 25, 2024       Friday April 26, 2024       Saturday April 27, 2024       Chema April 28, 2024       Monday April 29, 2024       Tuesday April 30, 2024       Wednesday May 1, 2024       Thursday May 2, 2024       Friday May 3, 2024       Saturday May 4, 2024       Chema May 5, 2024       Monday May 6, 2024       Tuesday May 7, 2024       Wednesday May 8, 2024       Thursday May 9, 2024       Friday May 10, 2024       Saturday May 11, 2024       Chema May 12, 2024       Monday May 13, 2024       Tuesday May 14, 2024       Wednesday May 15, 2024       Thursday May 16, 2024       Friday May 17, 2024       Saturday May 18, 2024            Trace Naylor MD

## 2024-03-20 NOTE — PATIENT INSTRUCTIONS
[facility]  [facility] CARDIOLOGY  [address] [place] [address]  Dept: [phone]  Dept Fax: [phone]    Date:   Patient: Gina Loomis    Blood Pressure Log  Provided By: Trace Naylor MD    Date Blood Pressure Heart Rate Comments   Thursday March 21, 2024       Friday March 22, 2024       Saturday March 23, 2024       Chema March 24, 2024       Monday March 25, 2024       Tuesday March 26, 2024       Wednesday March 27, 2024       Thursday March 28, 2024       Friday March 29, 2024       Saturday March 30, 2024       Chema March 31, 2024       Monday April 1, 2024       Tuesday April 2, 2024       Wednesday April 3, 2024       Thursday April 4, 2024       Friday April 5, 2024       Saturday April 6, 2024       Chema April 7, 2024       Monday April 8, 2024       Tuesday April 9, 2024       Wednesday April 10, 2024       Thursday April 11, 2024       Friday April 12, 2024       Saturday April 13, 2024       Chema April 14, 2024       Monday April 15, 2024       Tuesday April 16, 2024       Wednesday April 17, 2024       Thursday April 18, 2024       Friday April 19, 2024       Saturday April 20, 2024       Chema April 21, 2024       Monday April 22, 2024       Tuesday April 23, 2024       Wednesday April 24, 2024       Thursday April 25, 2024       Friday April 26, 2024       Saturday April 27, 2024       Chema April 28, 2024       Monday April 29, 2024       Tuesday April 30, 2024       Wednesday May 1, 2024       Thursday May 2, 2024       Friday May 3, 2024       Saturday May 4, 2024       Chema May 5, 2024       Monday May 6, 2024       Tuesday May 7, 2024       Wednesday May 8, 2024       Thursday May 9, 2024       Friday May 10, 2024       Saturday May 11, 2024       Chema May 12, 2024       Monday May 13, 2024       Tuesday May 14, 2024       Wednesday May 15, 2024       Thursday May 16, 2024       Friday May 17, 2024       Saturday May 18, 2024

## 2024-03-20 NOTE — ASSESSMENT & PLAN NOTE
Hypertension is stable and controlled  Medication changes per orders.  Dietary sodium restriction.  Weight loss.  Regular aerobic exercise.  Stop smoking.  Ambulatory blood pressure monitoring.  Blood pressure profile could be optimized.  Patient overall heart rate fast.  Dry mouth suggestive of volume depletion related to diuretics.  Stop Inderal and start carvedilol 6.25 mg 1 tab twice daily.  Plan to wean off nifedipine, decrease diuretics and uptitrate losartan.  Blood pressure will be reassessed  in 2 months .

## 2024-03-20 NOTE — ASSESSMENT & PLAN NOTE
Lipid abnormalities are improving with treatment    Plan:  Continue same medication/s without change.      Discussed medication dosage, use, side effects, and goals of treatment in detail.    Counseled patient on lifestyle modifications to help control hyperlipidemia.   Cholesterol lowering dietary information shared with patient.  Advised patient to exercise for 150 minutes weekly. (30 minute brisk walk, 5 days a week for example)  Weight Loss encouraged  Stop tobacco use encouraged  Patient counseled in regards to heart healthy, low fat/ low cholesterol/low sat diet, daily exercise for 30 minutes, low to moderate intensity, and weight loss.    Patient Treatment Goals:   LDL goal is less than 70    Followup in 1 year.

## 2024-03-21 ENCOUNTER — TELEPHONE (OUTPATIENT)
Dept: FAMILY MEDICINE CLINIC | Facility: CLINIC | Age: 44
End: 2024-03-21

## 2024-03-21 DIAGNOSIS — I10 ESSENTIAL HYPERTENSION: Primary | ICD-10-CM

## 2024-03-21 DIAGNOSIS — E11.9 TYPE 2 DIABETES MELLITUS WITHOUT COMPLICATION, WITHOUT LONG-TERM CURRENT USE OF INSULIN: ICD-10-CM

## 2024-03-21 NOTE — TELEPHONE ENCOUNTER
Patient had to reschedule appt for 3/21 with pcp. She is wanting Dr. Stratton to place lab orders for her to get her refills.   She is also needing refills on all her medications.   Please advise

## 2024-03-22 DIAGNOSIS — F41.9 ANXIETY AND DEPRESSION: ICD-10-CM

## 2024-03-22 DIAGNOSIS — F32.A ANXIETY AND DEPRESSION: ICD-10-CM

## 2024-03-25 RX ORDER — FLUOXETINE HYDROCHLORIDE 20 MG/1
60 CAPSULE ORAL DAILY
Qty: 90 CAPSULE | Refills: 0 | Status: SHIPPED | OUTPATIENT
Start: 2024-03-25

## 2024-03-29 ENCOUNTER — HOSPITAL ENCOUNTER (OUTPATIENT)
Dept: ONCOLOGY | Facility: HOSPITAL | Age: 44
Discharge: HOME | End: 2024-03-29
Payer: COMMERCIAL

## 2024-03-29 VITALS
WEIGHT: 293 LBS | TEMPERATURE: 97.7 F | HEIGHT: 64 IN | HEART RATE: 84 BPM | RESPIRATION RATE: 16 BRPM | BODY MASS INDEX: 50.02 KG/M2 | DIASTOLIC BLOOD PRESSURE: 69 MMHG | SYSTOLIC BLOOD PRESSURE: 128 MMHG

## 2024-03-29 DIAGNOSIS — K90.9 IRON MALABSORPTION: ICD-10-CM

## 2024-03-29 DIAGNOSIS — D50.8 IRON DEFICIENCY ANEMIA SECONDARY TO INADEQUATE DIETARY IRON INTAKE: Primary | ICD-10-CM

## 2024-03-29 PROCEDURE — 96374 THER/PROPH/DIAG INJ IV PUSH: CPT

## 2024-03-29 PROCEDURE — 96365 THER/PROPH/DIAG IV INF INIT: CPT

## 2024-03-29 PROCEDURE — 25810000003 SODIUM CHLORIDE 0.9 % SOLUTION: Performed by: INTERNAL MEDICINE

## 2024-03-29 PROCEDURE — 25010000002 IRON SUCROSE PER 1 MG: Performed by: INTERNAL MEDICINE

## 2024-03-29 RX ORDER — SODIUM CHLORIDE 9 MG/ML
20 INJECTION, SOLUTION INTRAVENOUS ONCE
Status: COMPLETED | OUTPATIENT
Start: 2024-03-29 | End: 2024-03-29

## 2024-03-29 RX ADMIN — SODIUM CHLORIDE 20 ML/HR: 9 INJECTION, SOLUTION INTRAVENOUS at 13:36

## 2024-03-29 RX ADMIN — IRON SUCROSE 200 MG: 20 INJECTION, SOLUTION INTRAVENOUS at 13:38

## 2024-04-11 ENCOUNTER — TELEPHONE (OUTPATIENT)
Dept: CARDIOLOGY | Facility: CLINIC | Age: 44
End: 2024-04-11
Payer: COMMERCIAL

## 2024-04-11 ENCOUNTER — HOSPITAL ENCOUNTER (OUTPATIENT)
Dept: ONCOLOGY | Facility: HOSPITAL | Age: 44
Discharge: HOME | End: 2024-04-11
Payer: COMMERCIAL

## 2024-04-11 VITALS
WEIGHT: 293 LBS | BODY MASS INDEX: 50.02 KG/M2 | DIASTOLIC BLOOD PRESSURE: 68 MMHG | SYSTOLIC BLOOD PRESSURE: 133 MMHG | TEMPERATURE: 97.6 F | HEIGHT: 64 IN | RESPIRATION RATE: 16 BRPM | HEART RATE: 76 BPM

## 2024-04-11 DIAGNOSIS — K90.9 IRON MALABSORPTION: ICD-10-CM

## 2024-04-11 DIAGNOSIS — E78.2 MIXED HYPERLIPIDEMIA: ICD-10-CM

## 2024-04-11 DIAGNOSIS — D50.8 IRON DEFICIENCY ANEMIA SECONDARY TO INADEQUATE DIETARY IRON INTAKE: Primary | ICD-10-CM

## 2024-04-11 PROCEDURE — 96365 THER/PROPH/DIAG IV INF INIT: CPT

## 2024-04-11 PROCEDURE — 25810000003 SODIUM CHLORIDE 0.9 % SOLUTION: Performed by: INTERNAL MEDICINE

## 2024-04-11 PROCEDURE — 96374 THER/PROPH/DIAG INJ IV PUSH: CPT

## 2024-04-11 PROCEDURE — 25010000002 IRON SUCROSE PER 1 MG: Performed by: INTERNAL MEDICINE

## 2024-04-11 RX ORDER — SODIUM CHLORIDE 9 MG/ML
20 INJECTION, SOLUTION INTRAVENOUS ONCE
Status: COMPLETED | OUTPATIENT
Start: 2024-04-11 | End: 2024-04-11

## 2024-04-11 RX ORDER — ICOSAPENT ETHYL 1000 MG/1
2 CAPSULE ORAL 2 TIMES DAILY WITH MEALS
Qty: 120 CAPSULE | Refills: 2 | Status: SHIPPED | OUTPATIENT
Start: 2024-04-11 | End: 2024-04-12 | Stop reason: SDUPTHER

## 2024-04-11 RX ADMIN — SODIUM CHLORIDE 20 ML/HR: 9 INJECTION, SOLUTION INTRAVENOUS at 13:34

## 2024-04-11 RX ADMIN — IRON SUCROSE 200 MG: 20 INJECTION, SOLUTION INTRAVENOUS at 13:34

## 2024-04-12 DIAGNOSIS — E78.2 MIXED HYPERLIPIDEMIA: ICD-10-CM

## 2024-04-12 RX ORDER — ICOSAPENT ETHYL 1000 MG/1
2 CAPSULE ORAL 2 TIMES DAILY WITH MEALS
Qty: 120 CAPSULE | Refills: 2 | Status: SHIPPED | OUTPATIENT
Start: 2024-04-12 | End: 2024-07-11

## 2024-04-15 ENCOUNTER — TELEPHONE (OUTPATIENT)
Dept: FAMILY MEDICINE CLINIC | Facility: CLINIC | Age: 44
End: 2024-04-15

## 2024-04-15 NOTE — TELEPHONE ENCOUNTER
PATIENT WENT TO [facility] EMERGENCY DEPARTMENT IN [place] YESTERDAY, 4/14 FOR BLOOD IN HER URINE. REPORTS THAT THEIR TESTING RULED OUT INFECTION. REPORTS THAT SHE WAS REFERRED BY [facility]'S ED TO A UROLOGIST AT [facility], BUT WAS TOLD BY THE UROLOGIST THAT SHE NEEDED A REFERRAL FROM HER PCP'S OFFICE.  STATES THAT SHE HAD AN MRI 3/28 WHICH SHOWED NO KIDNEY STONES.   SCHEDULED WITH DIANA TOMORROW, 4/16. STATES THAT SHE WILL BRING NOTES FROM HER ED VISIT WITH HER TO HER APPOINTMENT.

## 2024-04-16 ENCOUNTER — LAB (OUTPATIENT)
Dept: FAMILY MEDICINE CLINIC | Facility: CLINIC | Age: 44
End: 2024-04-16
Payer: COMMERCIAL

## 2024-04-16 ENCOUNTER — OFFICE VISIT (OUTPATIENT)
Dept: FAMILY MEDICINE CLINIC | Facility: CLINIC | Age: 44
End: 2024-04-16
Payer: COMMERCIAL

## 2024-04-16 VITALS
WEIGHT: 293 LBS | HEIGHT: 64 IN | DIASTOLIC BLOOD PRESSURE: 68 MMHG | SYSTOLIC BLOOD PRESSURE: 118 MMHG | HEART RATE: 89 BPM | BODY MASS INDEX: 50.02 KG/M2 | OXYGEN SATURATION: 98 %

## 2024-04-16 DIAGNOSIS — N89.8 VAGINAL IRRITATION: ICD-10-CM

## 2024-04-16 DIAGNOSIS — R31.0 GROSS HEMATURIA: Primary | ICD-10-CM

## 2024-04-16 DIAGNOSIS — R10.9 BILATERAL FLANK PAIN: ICD-10-CM

## 2024-04-16 LAB
BILIRUB BLD-MCNC: NEGATIVE MG/DL
CLARITY, POC: CLEAR
COLOR UR: YELLOW
EXPIRATION DATE: ABNORMAL
GLUCOSE UR STRIP-MCNC: NEGATIVE MG/DL
KETONES UR QL: NEGATIVE
LEUKOCYTE EST, POC: NEGATIVE
Lab: ABNORMAL
NITRITE UR-MCNC: NEGATIVE MG/ML
PH UR: 6 [PH] (ref 5–8)
PROT UR STRIP-MCNC: NEGATIVE MG/DL
RBC # UR STRIP: ABNORMAL /UL
SP GR UR: 1.02 (ref 1–1.03)
UROBILINOGEN UR QL: ABNORMAL

## 2024-04-16 PROCEDURE — 99214 OFFICE O/P EST MOD 30 MIN: CPT | Performed by: NURSE PRACTITIONER

## 2024-04-16 PROCEDURE — 81003 URINALYSIS AUTO W/O SCOPE: CPT | Performed by: NURSE PRACTITIONER

## 2024-04-16 RX ORDER — FLUCONAZOLE 150 MG/1
150 TABLET ORAL ONCE
Qty: 1 TABLET | Refills: 0 | Status: SHIPPED | OUTPATIENT
Start: 2024-04-16 | End: 2024-04-16

## 2024-04-16 NOTE — ASSESSMENT & PLAN NOTE
Patient reports genitalia burning when she voids. Denies itching, denies vaginal discharge. Reports she has had Diflucan after antibiotics, has seen no improvement. She would like to try another dose of diflucan - will send today. Discussed risks/benefits and possible side effects.

## 2024-04-16 NOTE — ASSESSMENT & PLAN NOTE
Reports she has had ele blood in her urine for the past month or so, reports she has even passed small clots. She was seen recently at the ER, has MRI that showed no kidney stones. She was told she was being referred to urology, but when she called the urologist office, they told her they wouldn't take an ER-referral and she needs referral from PCP    Will refer today, discussed pushing fluids. Return to ER if symptoms worsen.

## 2024-04-16 NOTE — ASSESSMENT & PLAN NOTE
Ongoing since Saturday, went to ER on Sunday. Reprots UTI and kidney stones were both ruled out at the ER. Taking Diclophenac BID and Tylenol as needed in between, reports heat feels good to her back.

## 2024-04-16 NOTE — PROGRESS NOTES
Office Note     Name: Gina Loomis    : 1980     MRN: [id number]     Chief Complaint  Blood in Urine (Pt has been peeing blood on and off since having a UTI on . Pt had clots and blood in her urine on  and tested negative UTI. )    Subjective     History of Present Illness:  Gina Loomis is a 44 y.o. female who presents today for follow-up from ER over the weekend. She continues to pass blood in her urine, ER told her she needs urology referral.       Objective     Past Medical History:   Diagnosis Date    Anemia     Anxiety state 2010    Anxiety syndrome     Asthma     Bell's palsy     twice    Carpal tunnel syndrome     Chronic bronchitis     CTS (carpal tunnel syndrome)     Depression     Diabetes mellitus     Dizziness     Fracture     GERD (gastroesophageal reflux disease) 2010    Headache, tension-type     HL (hearing loss)     Hyperlipidemia 2013    Hypertension     Hypothyroidism 2013    Iron deficiency anemia secondary to inadequate dietary iron intake 2023    Kidney infection     Memory loss     Memory loss     Obesity     Preeclampsia 2004        Shingles     Sleep apnea 12    Thyroid disease     Trigeminal neuralgia     Upper respiratory infection 2018    Weakness      Past Surgical History:   Procedure Laterality Date    CARPAL TUNNEL RELEASE Left 2015    CARPAL TUNNEL RELEASE Right 08/15/2008     SECTION      X 2,  and     COLONOSCOPY N/A 3/5/2024    Procedure: COLONOSCOPY;  Surgeon: Ronald Reyes MD;  Location:  [facility];  Service: Gastroenterology;  Laterality: N/A;    ENDOSCOPY N/A 3/5/2024    Procedure: ESOPHAGOGASTRODUODENOSCOPY;  Surgeon: Ronald Reyes MD;  Location:  [facility];  Service: Gastroenterology;  Laterality: N/A;    HERNIA REPAIR      incisional on abdomen    HYSTEROSCOPY ENDOMETRIAL ABLATION TUBAL STERILIZATION      LAPAROSCOPIC TUBAL  "LIGATION  2020     Family History   Problem Relation Age of Onset    Hypertension Mother     Coronary artery disease Mother     Heart disease Mother     High cholesterol Mother     High cholesterol Father     Heart disease Father     Diabetes Father     Coronary artery disease Father     Kidney failure Father     Hypertension Father     Hyperlipidemia Father     Diabetes Sister     Lymphoma Maternal Grandmother     Diabetes Maternal Grandmother     Obesity Maternal Grandmother     Diabetes Paternal Grandmother        Vital Signs  /68   Pulse 89   Ht 162.6 cm (64\")   Wt (!) 138 kg (305 lb)   SpO2 98%   BMI 52.35 kg/m²   Estimated body mass index is 52.35 kg/m² as calculated from the following:    Height as of this encounter: 162.6 cm (64\").    Weight as of this encounter: 138 kg (305 lb).    Physical Exam  Vitals reviewed.   Constitutional:       Appearance: Normal appearance.   Cardiovascular:      Rate and Rhythm: Normal rate and regular rhythm.      Heart sounds: Normal heart sounds.   Pulmonary:      Effort: Pulmonary effort is normal.      Breath sounds: Normal breath sounds.   Skin:     General: Skin is warm and dry.      Capillary Refill: Capillary refill takes less than 2 seconds.   Neurological:      General: No focal deficit present.      Mental Status: She is alert and oriented to person, place, and time.   Psychiatric:         Mood and Affect: Mood normal.         Behavior: Behavior normal.               Assessment and Plan     Diagnoses and all orders for this visit:    1. Gross hematuria (Primary)  Assessment & Plan:  Reports she has had ele blood in her urine for the past month or so, reports she has even passed small clots. She was seen recently at the ER, has MRI that showed no kidney stones. She was told she was being referred to urology, but when she called the urologist office, they told her they wouldn't take an ER-referral and she needs referral from PCP    Will refer today, " discussed pushing fluids. Return to ER if symptoms worsen.     Orders:  -     Ambulatory Referral to Urology    2. Bilateral flank pain  Assessment & Plan:  Ongoing since Saturday, went to ER on Sunday. Reprots UTI and kidney stones were both ruled out at the ER. Taking Diclophenac BID and Tylenol as needed in between, reports heat feels good to her back.     Orders:  -     Ambulatory Referral to Urology    3. Vaginal irritation  Assessment & Plan:  Patient reports genitalia burning when she voids. Denies itching, denies vaginal discharge. Reports she has had Diflucan after antibiotics, has seen no improvement. She would like to try another dose of diflucan - will send today. Discussed risks/benefits and possible side effects.     Orders:  -     fluconazole (Diflucan) 150 MG tablet; Take 1 tablet by mouth 1 (One) Time for 1 dose.  Dispense: 1 tablet; Refill: 0      Class 3 Severe Obesity (BMI >=40). Obesity-related health conditions include the following: obstructive sleep apnea, hypertension, diabetes mellitus, dyslipidemias, GERD, and osteoarthritis. Obesity is unchanged. BMI is is above average; BMI management plan is completed. We discussed portion control and increasing exercise.        Vaccine Counseling:      Advanced Care Planning:   Patient does not have an advance directive, declines further assistance.    Smoking Cessation:   less than 3 minutes spent counseling Not agreeable to stopping    Follow Up  Return if symptoms worsen or fail to improve.    BENY Shen

## 2024-04-18 DIAGNOSIS — D50.9 IRON DEFICIENCY ANEMIA, UNSPECIFIED IRON DEFICIENCY ANEMIA TYPE: ICD-10-CM

## 2024-04-18 RX ORDER — FERROUS GLUCONATE 324(38)MG
1 TABLET ORAL
Qty: 30 TABLET | Refills: 0 | OUTPATIENT
Start: 2024-04-18

## 2024-04-23 ENCOUNTER — OFFICE VISIT (OUTPATIENT)
Dept: FAMILY MEDICINE CLINIC | Facility: CLINIC | Age: 44
End: 2024-04-23
Payer: COMMERCIAL

## 2024-04-23 VITALS
HEART RATE: 86 BPM | SYSTOLIC BLOOD PRESSURE: 120 MMHG | BODY MASS INDEX: 50.02 KG/M2 | HEIGHT: 64 IN | WEIGHT: 293 LBS | OXYGEN SATURATION: 99 % | DIASTOLIC BLOOD PRESSURE: 70 MMHG

## 2024-04-23 DIAGNOSIS — J43.9 PULMONARY EMPHYSEMA, UNSPECIFIED EMPHYSEMA TYPE: ICD-10-CM

## 2024-04-23 DIAGNOSIS — J30.9 ALLERGIC RHINITIS, UNSPECIFIED SEASONALITY, UNSPECIFIED TRIGGER: ICD-10-CM

## 2024-04-23 DIAGNOSIS — F32.A ANXIETY AND DEPRESSION: ICD-10-CM

## 2024-04-23 DIAGNOSIS — I10 ESSENTIAL HYPERTENSION: ICD-10-CM

## 2024-04-23 DIAGNOSIS — K21.9 GASTROESOPHAGEAL REFLUX DISEASE, UNSPECIFIED WHETHER ESOPHAGITIS PRESENT: ICD-10-CM

## 2024-04-23 DIAGNOSIS — F41.9 ANXIETY AND DEPRESSION: ICD-10-CM

## 2024-04-23 DIAGNOSIS — R31.1 BENIGN ESSENTIAL MICROSCOPIC HEMATURIA: Primary | ICD-10-CM

## 2024-04-23 DIAGNOSIS — E03.9 HYPOTHYROIDISM (ACQUIRED): ICD-10-CM

## 2024-04-23 DIAGNOSIS — D50.9 IRON DEFICIENCY ANEMIA, UNSPECIFIED IRON DEFICIENCY ANEMIA TYPE: ICD-10-CM

## 2024-04-23 DIAGNOSIS — R68.84 JAW PAIN: ICD-10-CM

## 2024-04-23 DIAGNOSIS — E11.65 UNCONTROLLED TYPE 2 DIABETES MELLITUS WITH HYPERGLYCEMIA: ICD-10-CM

## 2024-04-23 LAB
BILIRUB BLD-MCNC: NEGATIVE MG/DL
CLARITY, POC: CLEAR
COLOR UR: YELLOW
EXPIRATION DATE: ABNORMAL
GLUCOSE UR STRIP-MCNC: NEGATIVE MG/DL
KETONES UR QL: NEGATIVE
LEUKOCYTE EST, POC: NEGATIVE
Lab: ABNORMAL
NITRITE UR-MCNC: NEGATIVE MG/ML
PH UR: 6.5 [PH] (ref 5–8)
PROT UR STRIP-MCNC: NEGATIVE MG/DL
RBC # UR STRIP: ABNORMAL /UL
SP GR UR: 1.01 (ref 1–1.03)
UROBILINOGEN UR QL: NORMAL

## 2024-04-23 PROCEDURE — 81003 URINALYSIS AUTO W/O SCOPE: CPT | Performed by: FAMILY MEDICINE

## 2024-04-23 PROCEDURE — 99214 OFFICE O/P EST MOD 30 MIN: CPT | Performed by: FAMILY MEDICINE

## 2024-04-23 RX ORDER — FERROUS GLUCONATE 324(38)MG
324 TABLET ORAL
Qty: 30 TABLET | Refills: 2 | Status: SHIPPED | OUTPATIENT
Start: 2024-04-23

## 2024-04-23 RX ORDER — FLUOXETINE HYDROCHLORIDE 20 MG/1
60 CAPSULE ORAL DAILY
Qty: 90 CAPSULE | Refills: 5 | Status: SHIPPED | OUTPATIENT
Start: 2024-04-23

## 2024-04-23 RX ORDER — TRIAMTERENE AND HYDROCHLOROTHIAZIDE 37.5; 25 MG/1; MG/1
1 TABLET ORAL DAILY
Qty: 30 TABLET | Refills: 5 | Status: SHIPPED | OUTPATIENT
Start: 2024-04-23

## 2024-04-23 RX ORDER — OMEPRAZOLE 20 MG/1
20 CAPSULE, DELAYED RELEASE ORAL 2 TIMES DAILY
Qty: 60 CAPSULE | Refills: 5 | Status: SHIPPED | OUTPATIENT
Start: 2024-04-23

## 2024-04-23 RX ORDER — MONTELUKAST SODIUM 10 MG/1
10 TABLET ORAL NIGHTLY
Qty: 90 TABLET | Refills: 1 | Status: SHIPPED | OUTPATIENT
Start: 2024-04-23

## 2024-04-23 RX ORDER — LEVOTHYROXINE SODIUM 0.05 MG/1
50 TABLET ORAL DAILY
Qty: 90 TABLET | Refills: 1 | Status: SHIPPED | OUTPATIENT
Start: 2024-04-23

## 2024-04-23 RX ORDER — ALBUTEROL SULFATE 90 UG/1
2 AEROSOL, METERED RESPIRATORY (INHALATION) EVERY 4 HOURS PRN
Qty: 18 G | Refills: 5 | Status: SHIPPED | OUTPATIENT
Start: 2024-04-23

## 2024-04-23 NOTE — PROGRESS NOTES
Follow Up Office Visit      Date of Visit:  2024   Patient Name: Gina Loomis  : 1980   MRN: [id number]     Chief Complaint:    Chief Complaint   Patient presents with    Blood in Urine    Hypertension       History of Present Illness: Gina Loomis is a 44 y.o. female who is here today for follow up.  Patient here for recheck on her chronic medical conditions.  Most recently has been undergoing evaluation for her anemia.  GI evaluation negative.  Has had iron transfusions and her CBC is overall improved.  Has had some hematuria and evaluation through urology is upcoming.        Subjective      Review of Systems:   Review of Systems   Constitutional:  Negative for fatigue and fever.   HENT:  Negative for congestion and ear pain.    Respiratory:  Negative for apnea, cough, chest tightness and shortness of breath.    Cardiovascular:  Negative for chest pain.   Gastrointestinal:  Negative for abdominal pain, constipation, diarrhea and nausea.   Genitourinary:  Positive for hematuria.   Musculoskeletal:  Negative for arthralgias.   Psychiatric/Behavioral:  Negative for depressed mood and stress.        Past Medical History:   Past Medical History:   Diagnosis Date    Allergic     Anemia     Anxiety state 2010    Anxiety syndrome     Asthma     Bell's palsy     twice    Carpal tunnel syndrome     Chronic bronchitis     CTS (carpal tunnel syndrome)     Depression     Diabetes mellitus     Dizziness     Fracture     GERD (gastroesophageal reflux disease) 2010    Headache, tension-type     HL (hearing loss)     Hyperlipidemia 2013    Hypertension     Hypothyroidism 2013    Iron deficiency anemia secondary to inadequate dietary iron intake 2023    Kidney infection     Memory loss     Memory loss     Obesity     Preeclampsia 2004        Shingles     Sleep apnea 05 12 12    Thyroid disease     Trigeminal neuralgia     Upper respiratory infection 2018     Weakness        Past Surgical History:   Past Surgical History:   Procedure Laterality Date    CARPAL TUNNEL RELEASE Left 2015    CARPAL TUNNEL RELEASE Right 08/15/2008     SECTION      X 2, 2004 and     COLONOSCOPY N/A 2024    Procedure: COLONOSCOPY;  Surgeon: Ronald Reyes MD;  Location:  [facility];  Service: Gastroenterology;  Laterality: N/A;    ENDOMETRIAL ABLATION      ENDOSCOPY N/A 2024    Procedure: ESOPHAGOGASTRODUODENOSCOPY;  Surgeon: Ronald Reyes MD;  Location:  [facility];  Service: Gastroenterology;  Laterality: N/A;    HERNIA REPAIR      incisional on abdomen    HYSTEROSCOPY ENDOMETRIAL ABLATION TUBAL STERILIZATION      LAPAROSCOPIC TUBAL LIGATION      TUBAL ABDOMINAL LIGATION         Family History:   Family History   Problem Relation Age of Onset    Hypertension Mother     Coronary artery disease Mother     Heart disease Mother     High cholesterol Mother     Asthma Mother     COPD Mother     High cholesterol Father     Heart disease Father     Diabetes Father     Coronary artery disease Father     Kidney failure Father     Hypertension Father     Hyperlipidemia Father     Diabetes Sister     Lymphoma Maternal Grandmother     Diabetes Maternal Grandmother     Obesity Maternal Grandmother     Diabetes Paternal Grandmother        Social History:   Social History     Socioeconomic History    Marital status:    Tobacco Use    Smoking status: Every Day     Current packs/day: 1.00     Average packs/day: 1 pack/day for 31.3 years (31.3 ttl pk-yrs)     Types: Cigarettes     Start date: 1993    Smokeless tobacco: Never   Vaping Use    Vaping status: Never Used   Substance and Sexual Activity    Alcohol use: Not Currently    Drug use: Never    Sexual activity: Yes     Partners: Male     Birth control/protection: Surgical       Medications:     Current Outpatient Medications:     albuterol sulfate  (90 Base) MCG/ACT  inhaler, Inhale 2 puffs Every 4 (Four) Hours As Needed for Wheezing., Disp: 18 g, Rfl: 5    diclofenac (VOLTAREN) 50 MG EC tablet, Take 1 tablet by mouth 2 (Two) Times a Day., Disp: 60 tablet, Rfl: 5    ferrous gluconate (FERGON) 324 MG tablet, Take 1 tablet by mouth Daily With Breakfast., Disp: 30 tablet, Rfl: 2    FLUoxetine (PROzac) 20 MG capsule, Take 3 capsules by mouth Daily., Disp: 90 capsule, Rfl: 5    levothyroxine (SYNTHROID, LEVOTHROID) 50 MCG tablet, Take 1 tablet by mouth Daily., Disp: 90 tablet, Rfl: 1    metFORMIN (GLUCOPHAGE) 1000 MG tablet, Take 1 tablet by mouth 2 (Two) Times a Day With Meals., Disp: 180 tablet, Rfl: 1    montelukast (SINGULAIR) 10 MG tablet, Take 1 tablet by mouth Every Night., Disp: 90 tablet, Rfl: 1    norethindrone (AYGESTIN) 5 MG tablet, Take 1 tablet by mouth Daily., Disp: 30 tablet, Rfl: 0    omeprazole (priLOSEC) 20 MG capsule, Take 1 capsule by mouth 2 (Two) Times a Day., Disp: 60 capsule, Rfl: 5    triamterene-hydrochlorothiazide (MAXZIDE-25) 37.5-25 MG per tablet, Take 1 tablet by mouth Daily., Disp: 30 tablet, Rfl: 5    carvedilol (COREG) 6.25 MG tablet, Take 1 tablet by mouth 2 (Two) Times a Day., Disp: 180 tablet, Rfl: 3    hydrOXYzine (ATARAX) 50 MG tablet, Take 1 tablet by mouth 2 (Two) Times a Day As Needed for Anxiety., Disp: , Rfl:     icosapent ethyl (Vascepa) 1 g capsule capsule, Take 2 g by mouth 2 (Two) Times a Day With Meals for 90 days., Disp: 120 capsule, Rfl: 2    losartan (COZAAR) 25 MG tablet, Take 1 tablet by mouth Daily., Disp: , Rfl:     meclizine (ANTIVERT) 25 MG tablet, Take 0.5 tablets by mouth 3 (Three) Times a Day As Needed for Dizziness., Disp: , Rfl:     NIFEdipine CC (ADALAT CC) 90 MG 24 hr tablet, Take 1 tablet by mouth Daily., Disp: 90 tablet, Rfl: 1    ondansetron ODT (ZOFRAN-ODT) 4 MG disintegrating tablet, 1 tablet Every 8 (Eight) Hours As Needed for Nausea or Vomiting., Disp: , Rfl:     rosuvastatin (CRESTOR) 10 MG tablet, Take 1 tablet  "by mouth Every Night., Disp: 90 tablet, Rfl: 1    Symbicort 160-4.5 MCG/ACT inhaler, Inhale 2 puffs 2 (Two) Times a Day., Disp: , Rfl:     Allergies:   Allergies   Allergen Reactions    Lisinopril Cough    Claritin-D 24 Hour [Loratadine-Pseudoephedrine Er] Other (See Comments)     Couldn't sleep    Morphine Itching       Objective     Physical Exam:  Vital Signs:   Vitals:    04/23/24 1119   BP: 120/70   Pulse: 86   SpO2: 99%   Weight: (!) 142 kg (312 lb)   Height: 162.6 cm (64\")     Body mass index is 53.55 kg/m².     Physical Exam  Vitals and nursing note reviewed.   Constitutional:       General: She is not in acute distress.     Appearance: Normal appearance. She is not ill-appearing.   HENT:      Head: Normocephalic and atraumatic.      Right Ear: Tympanic membrane and ear canal normal.      Left Ear: Tympanic membrane and ear canal normal.      Nose: Nose normal.   Cardiovascular:      Rate and Rhythm: Normal rate and regular rhythm.      Heart sounds: Normal heart sounds.   Pulmonary:      Effort: Pulmonary effort is normal.      Breath sounds: Normal breath sounds.   Neurological:      Mental Status: She is alert and oriented to person, place, and time. Mental status is at baseline.   Psychiatric:         Mood and Affect: Mood normal.         Procedures      Assessment / Plan      Assessment/Plan:   Diagnoses and all orders for this visit:    1. Benign essential microscopic hematuria (Primary)  -     POCT urinalysis dipstick, automated    2. Essential hypertension  -     triamterene-hydrochlorothiazide (MAXZIDE-25) 37.5-25 MG per tablet; Take 1 tablet by mouth Daily.  Dispense: 30 tablet; Refill: 5    3. Gastroesophageal reflux disease, unspecified whether esophagitis present  -     omeprazole (priLOSEC) 20 MG capsule; Take 1 capsule by mouth 2 (Two) Times a Day.  Dispense: 60 capsule; Refill: 5    4. Jaw pain  -     diclofenac (VOLTAREN) 50 MG EC tablet; Take 1 tablet by mouth 2 (Two) Times a Day.  " Dispense: 60 tablet; Refill: 5    5. Pulmonary emphysema, unspecified emphysema type  -     albuterol sulfate  (90 Base) MCG/ACT inhaler; Inhale 2 puffs Every 4 (Four) Hours As Needed for Wheezing.  Dispense: 18 g; Refill: 5    6. Iron deficiency anemia, unspecified iron deficiency anemia type  -     ferrous gluconate (FERGON) 324 MG tablet; Take 1 tablet by mouth Daily With Breakfast.  Dispense: 30 tablet; Refill: 2    7. Allergic rhinitis, unspecified seasonality, unspecified trigger  -     montelukast (SINGULAIR) 10 MG tablet; Take 1 tablet by mouth Every Night.  Dispense: 90 tablet; Refill: 1    8. Uncontrolled type 2 diabetes mellitus with hyperglycemia  -     metFORMIN (GLUCOPHAGE) 1000 MG tablet; Take 1 tablet by mouth 2 (Two) Times a Day With Meals.  Dispense: 180 tablet; Refill: 1    9. Hypothyroidism (acquired)  -     levothyroxine (SYNTHROID, LEVOTHROID) 50 MCG tablet; Take 1 tablet by mouth Daily.  Dispense: 90 tablet; Refill: 1    10. Anxiety and depression  -     FLUoxetine (PROzac) 20 MG capsule; Take 3 capsules by mouth Daily.  Dispense: 90 capsule; Refill: 5    Other orders  -     norethindrone (AYGESTIN) 5 MG tablet; Take 1 tablet by mouth Daily.  Dispense: 30 tablet; Refill: 0         I made no changes to her chronic medical management.  I did review her previous labs which were relatively stable.  Still undergoing evaluation for her anemia.  Has had some hematuria.  Seeing urology next week.  Gastroenterological evaluation for anemia was negative.  Seeing also hematology which is given iron transfusions.  Now on iron orally.    Follow Up:   No follow-ups on file.    Daniel Stratton  [facility]

## 2024-04-24 ENCOUNTER — TELEPHONE (OUTPATIENT)
Dept: CARDIOLOGY | Facility: CLINIC | Age: 44
End: 2024-04-24
Payer: COMMERCIAL

## 2024-04-24 LAB — NT-PROBNP SERPL-MCNC: <36 PG/ML (ref 0–130)

## 2024-04-24 NOTE — TELEPHONE ENCOUNTER
----- Message from Trace Naylor MD sent at 4/24/2024 11:22 AM EDT -----  Please inform patient that her heart blood test was normal. Thank you!

## 2024-05-01 ENCOUNTER — OFFICE VISIT (OUTPATIENT)
Dept: UROLOGY | Facility: CLINIC | Age: 44
End: 2024-05-01
Payer: COMMERCIAL

## 2024-05-01 VITALS — HEIGHT: 64 IN | WEIGHT: 293 LBS | BODY MASS INDEX: 50.02 KG/M2

## 2024-05-01 DIAGNOSIS — N39.0 RECURRENT UTI: ICD-10-CM

## 2024-05-01 DIAGNOSIS — N32.81 OAB (OVERACTIVE BLADDER): ICD-10-CM

## 2024-05-01 DIAGNOSIS — R31.0 GROSS HEMATURIA: Primary | ICD-10-CM

## 2024-05-01 RX ORDER — METHENAMINE HIPPURATE 1000 MG/1
1 TABLET ORAL 2 TIMES DAILY WITH MEALS
Qty: 30 TABLET | Refills: 2 | Status: SHIPPED | OUTPATIENT
Start: 2024-05-01

## 2024-05-01 NOTE — PROGRESS NOTES
Microscopic Hematuria Female Office Visit      Patient Name: Gina Loomis  : 1980   MRN: [id number]     Chief Complaint:  Hematuria.     Chief Complaint   Patient presents with    Blood in Urine   .     Referring Provider: Charito Clark APRN    History of Present Illness: Ms. Loomis is a 44 y.o. female who presents to clinic for evaluation of gross hematuria, OAB and rUTIs.  Prior medical history of hypertension, hyperlipidemia, type 2 diabetes, morbid obesity, NATHAN, GERD, ovarian cyst, TONY, MDD, YUSUF on CPAP.  Reports multiple episodes of gross hematuria accompanying UTIs.  Does not occur with every UTI, but it does from occasion.  This past March, patient had colonoscopy performed.  Shortly thereafter she developed issues with dysuria and flank pain.  She presented to an urgent care and was diagnosed with a UTI.  No culture was performed.  She is provided with antibiotics.  Her symptoms continued and she developed some gross hematuria.  She went to the ER and was again diagnosed with a UTI with a positive culture.  This was treated, however symptoms persisted.  She returned to the ER and had a CT scan which was unremarkable.  She was again treated with fosfomycin and this seemed to resolve her dysuria and flank pain, however she reports she continues to have some intermittent gross hematuria with some clots.    Patient also reports issues with overactive bladder symptoms.  Predominant symptoms of urinary frequency, urgency, nocturia.  She states she is on some type of the medication for this in the past but does not remember what it was.  She does not think it was helpful for her.  She states that she has cut back her caffeine use but the symptoms persist.  She has also tried timed voids and double voiding with no effect.  She does continue to smoke about a 1-1/2 packs/day.    Denies any family history of  malignancy.      Subjective      Review of System:     Review of Systems    Constitutional: Negative.    HENT: Negative.     Eyes: Negative.    Respiratory: Negative.     Cardiovascular: Negative.    Gastrointestinal: Negative.    Endocrine: Negative.    Genitourinary:  Positive for urgency.   Musculoskeletal: Negative.    Allergic/Immunologic: Negative.    Neurological: Negative.    Hematological: Negative.    Psychiatric/Behavioral: Negative.        I have reviewed the ROS documented by my clinical staff, I have updated appropriately and I agree. Vin Garcia PA-C    Past Medical History:  Past Medical History:   Diagnosis Date    Allergic     Anemia     Anxiety state 2010    Anxiety syndrome     Asthma     Bell's palsy     twice    Carpal tunnel syndrome     Chronic bronchitis     CTS (carpal tunnel syndrome)     Depression     Diabetes mellitus     Dizziness     Fracture     GERD (gastroesophageal reflux disease) 2010    Headache, tension-type     HL (hearing loss)     Hyperlipidemia 2013    Hypertension     Hypothyroidism 2013    Iron deficiency anemia secondary to inadequate dietary iron intake 2023    Kidney infection     Memory loss     Memory loss     Obesity     Preeclampsia 2004        Shingles     Sleep apnea 12    Thyroid disease     Trigeminal neuralgia     Upper respiratory infection 2018    Weakness        Past Surgical History:  Past Surgical History:   Procedure Laterality Date    CARPAL TUNNEL RELEASE Left 2015    CARPAL TUNNEL RELEASE Right 08/15/2008     SECTION      X 2,  and     COLONOSCOPY N/A 2024    Procedure: COLONOSCOPY;  Surgeon: Ronald Reyes MD;  Location:  [facility];  Service: Gastroenterology;  Laterality: N/A;    ENDOMETRIAL ABLATION      ENDOSCOPY N/A 2024    Procedure: ESOPHAGOGASTRODUODENOSCOPY;  Surgeon: Ronald Reyes MD;  Location:  [facility];  Service: Gastroenterology;  Laterality: N/A;    HERNIA REPAIR      incisional on abdomen     HYSTEROSCOPY ENDOMETRIAL ABLATION TUBAL STERILIZATION  2020    LAPAROSCOPIC TUBAL LIGATION  2020    TUBAL ABDOMINAL LIGATION         Medications:    Current Outpatient Medications:     albuterol sulfate  (90 Base) MCG/ACT inhaler, Inhale 2 puffs Every 4 (Four) Hours As Needed for Wheezing., Disp: 18 g, Rfl: 5    carvedilol (COREG) 6.25 MG tablet, Take 1 tablet by mouth 2 (Two) Times a Day., Disp: 180 tablet, Rfl: 3    diclofenac (VOLTAREN) 50 MG EC tablet, Take 1 tablet by mouth 2 (Two) Times a Day., Disp: 60 tablet, Rfl: 5    ferrous gluconate (FERGON) 324 MG tablet, Take 1 tablet by mouth Daily With Breakfast., Disp: 30 tablet, Rfl: 2    FLUoxetine (PROzac) 20 MG capsule, Take 3 capsules by mouth Daily., Disp: 90 capsule, Rfl: 5    hydrOXYzine (ATARAX) 50 MG tablet, Take 1 tablet by mouth 2 (Two) Times a Day As Needed for Anxiety., Disp: , Rfl:     icosapent ethyl (Vascepa) 1 g capsule capsule, Take 2 g by mouth 2 (Two) Times a Day With Meals for 90 days., Disp: 120 capsule, Rfl: 2    levothyroxine (SYNTHROID, LEVOTHROID) 50 MCG tablet, Take 1 tablet by mouth Daily., Disp: 90 tablet, Rfl: 1    losartan (COZAAR) 25 MG tablet, Take 1 tablet by mouth Daily., Disp: , Rfl:     meclizine (ANTIVERT) 25 MG tablet, Take 0.5 tablets by mouth 3 (Three) Times a Day As Needed for Dizziness., Disp: , Rfl:     metFORMIN (GLUCOPHAGE) 1000 MG tablet, Take 1 tablet by mouth 2 (Two) Times a Day With Meals., Disp: 180 tablet, Rfl: 1    montelukast (SINGULAIR) 10 MG tablet, Take 1 tablet by mouth Every Night., Disp: 90 tablet, Rfl: 1    NIFEdipine CC (ADALAT CC) 90 MG 24 hr tablet, Take 1 tablet by mouth Daily., Disp: 90 tablet, Rfl: 1    norethindrone (AYGESTIN) 5 MG tablet, Take 1 tablet by mouth Daily., Disp: 30 tablet, Rfl: 0    omeprazole (priLOSEC) 20 MG capsule, Take 1 capsule by mouth 2 (Two) Times a Day., Disp: 60 capsule, Rfl: 5    ondansetron ODT (ZOFRAN-ODT) 4 MG disintegrating tablet, 1 tablet Every 8 (Eight) Hours  "As Needed for Nausea or Vomiting., Disp: , Rfl:     rosuvastatin (CRESTOR) 10 MG tablet, Take 1 tablet by mouth Every Night., Disp: 90 tablet, Rfl: 1    Symbicort 160-4.5 MCG/ACT inhaler, Inhale 2 puffs 2 (Two) Times a Day., Disp: , Rfl:     triamterene-hydrochlorothiazide (MAXZIDE-25) 37.5-25 MG per tablet, Take 1 tablet by mouth Daily., Disp: 30 tablet, Rfl: 5    methenamine (HIPREX) 1 g tablet, Take 1 tablet by mouth 2 (Two) Times a Day With Meals., Disp: 30 tablet, Rfl: 2    Allergies:  Allergies   Allergen Reactions    Lisinopril Cough    Claritin-D 24 Hour [Loratadine-Pseudoephedrine Er] Other (See Comments)     Couldn't sleep    Morphine Itching       Social History:  Social History     Socioeconomic History    Marital status:    Tobacco Use    Smoking status: Every Day     Current packs/day: 1.00     Average packs/day: 1 pack/day for 31.3 years (31.3 ttl pk-yrs)     Types: Cigarettes     Start date: 1/1/1993     Passive exposure: Current    Smokeless tobacco: Never   Vaping Use    Vaping status: Never Used   Substance and Sexual Activity    Alcohol use: Not Currently    Drug use: Never    Sexual activity: Yes     Partners: Male     Birth control/protection: Surgical       Family History:  Family History   Problem Relation Age of Onset    Hypertension Mother     Coronary artery disease Mother     Heart disease Mother     High cholesterol Mother     Asthma Mother     COPD Mother     High cholesterol Father     Heart disease Father     Diabetes Father     Coronary artery disease Father     Kidney failure Father     Hypertension Father     Hyperlipidemia Father     Diabetes Sister     Lymphoma Maternal Grandmother     Diabetes Maternal Grandmother     Obesity Maternal Grandmother     Diabetes Paternal Grandmother          Objective     Physical Exam:   Vital Signs:   Vitals:    05/01/24 1355   Weight: (!) 142 kg (312 lb)   Height: 162.6 cm (64.02\")   PainSc: 0-No pain     Body mass index is 53.53 kg/m². "     Physical Exam  Constitutional:       Appearance: Normal appearance.   HENT:      Head: Normocephalic and atraumatic.      Nose: Nose normal.      Mouth/Throat:      Mouth: Mucous membranes are moist.      Pharynx: Oropharynx is clear.   Eyes:      Extraocular Movements: Extraocular movements intact.      Pupils: Pupils are equal, round, and reactive to light.   Pulmonary:      Effort: Pulmonary effort is normal. No respiratory distress.   Musculoskeletal:         General: No swelling or deformity. Normal range of motion.      Cervical back: Normal range of motion and neck supple.   Skin:     General: Skin is warm and dry.   Neurological:      General: No focal deficit present.      Mental Status: She is alert and oriented to person, place, and time. Mental status is at baseline.   Psychiatric:         Mood and Affect: Mood normal.         Behavior: Behavior normal.         Labs:   Brief Urine Lab Results  (Last result in the past 365 days)        Color   Clarity   Blood   Leuk Est   Nitrite   Protein   CREAT   Urine HCG        04/23/24 1126 Yellow   Clear   Trace   Negative   Negative   Negative                   Urine Culture          7/6/2023    13:30 10/30/2023    00:00 11/20/2023    15:00   Urine Culture   Urine Culture Final report  Final report  Final report         Lab Results   Component Value Date    GLUCOSE 124 (H) 04/16/2024    CALCIUM 9.9 04/16/2024     04/16/2024    K 4.2 04/16/2024    CO2 18 (L) 04/16/2024     04/16/2024    BUN 12 04/16/2024    CREATININE 0.79 04/16/2024    BCR 15 04/16/2024       Lab Results   Component Value Date    WBC 8.3 04/16/2024    HGB 12.6 04/16/2024    HCT 38.7 04/16/2024    MCV 84 04/16/2024     04/16/2024       Images:   CT Angiogram Coronary    Result Date: 2/25/2024  Impression: No significant noncardiac CT findings. Please refer to CT Angiogram Coronary   Cardiology interpretation report for information on cardiac structures. Electronically Signed:  Mark Murphy MD  2/25/2024 5:56 PM EST  Workstation ID: [id number]    CT Angiogram Coronary-Cardiology Interpretation    Result Date: 2/23/2024  Normal coronary arteries (0% stenosis) . CAD RADS 1 Overall there is mild amount of coronary plaque No non-atherosclerotic coronary abnormalities Normal LV systolic function (calculated LVEF 85%) Please refer to the radiology report for information on non-cardiac structures.   RECOMMENDATION: CAD RADS 1, consider risk factor modification and preventive pharmacotherapy and consider nonatherosclerotic causes of her symptoms. CAD-RADS 0: Reassurance. Consider nonatherosclerotic causes of symptoms CAD-RADS 1: Consider nonatherosclerotic causes of symptoms P1 = Consider risk factor modification and preventative pharmacotherapy P2 = Risk factor modification and preventative pharmacotherapy P3 or P4 = Aggressive risk factor modification and preventative pharmacotherapy CAD-RADS 2: Consider nonatherosclerotic causes of symptoms P1 or P2: Risk factor modification and preventive pharmacotherapy P3 or P4: Aggressive risk factor modification and preventative pharmacotherapy CAD-RADS 3: Aggressive risk factor modification and preventative pharmacotherapy Other treatments (including antianginal therapy) should be considered per guideline directed care [If FFRCT positive] Consider invasive coronary angiography if ischemia documented and if frequent symptoms persist after guideline directed medical therapy CAD-RADS 4: Aggressive risk factor modification and preventative pharmacotherapy Other treatments (including antianginal therapy and options of revascularization) should be considered per guideline directed care CAD-RADS 5: Aggressive risk factor modification preventative pharmacotherapy Other treatments (including antianginal therapy and options of revascularization) should be considered per guideline directed care CAD-RADS N: Nondiagnostic study.  Additional/alternative evaluation may be  needed      Measures:   Tobacco:   Gina Loomis  reports that she has been smoking cigarettes. She started smoking about 31 years ago. She has a 31.3 pack-year smoking history. She has been exposed to tobacco smoke. She has never used smokeless tobacco. I have educated her on the risk of diseases from using tobacco products such as cancer, COPD, and heart disease.     I advised her to quit and she is not willing to quit.      Urine Incontinence: Patient reports that she is not currently experiencing any symptoms of urinary incontinence.      Assessment / Plan      Assessment/Plan:   Ms. Gina Loomis is a 44 y.o. female who presents today for evaluation of gross hematuria, OAB, recurrent UTIs.  Etiology of her gross hematuria is likely secondary to recurrent UTIs.  Last CT she had done in the ER last month was negative for stones.  That said, given her smoking history, we should ensure that gross hematuria is not secondary to underlying malignancy.  She also has some issues with OAB and recurrent UTIs.  We discussed that if she has a negative gross hematuria workup, we will have her follow-up with me for further evaluation and treatment of this.  In the meantime, I will start her on Hip-Chema plus vitamin C for UTI prophylaxis and send her urine off today for guidance PCR urine culture.  Will call with results and treat if indicated.  Will consider trial of anticholinergic versus beta 3 agonist for OAB symptoms.    The patient was counseled regarding the possible etiologies, relevant work-up and diagnostic approach for microscopic hematuria, as well as the relevant risk categories as assigned by the American Urological Association guidelines.  I discussed that the definition of microscopic hematuria includes a microscopic urinalysis (not dipstick UA) positive for greater than 3 RBCs per high-powered field under microscopy.  We also discussed that any history of gross hematuria places a patient at higher  risk for occult malignancies and necessitates prompt workup.  We discussed the aforementioned risk categories as assigned by the AUA, which are depicted below.  Ultimately, work-up and diagnosis of microscopic hematuria is driven by risk category.  Given the patient's age, smoking history; the patient is deemed high risk, and for these reasons I recommend proceeding with a flexible diagnostic cystoscopy and CT urogram to assess the collecting system of the kidney and bladder.     If the patient's microscopic hematuria work-up is negative, per AUA guidelines I would recommend a repeat urinalysis via the patient's primary care provider in 12 months.  If the repeat urinalysis is positive, the patient and I will then need to have a shared decision-making conversation regarding further work-up versus observation, given the low likelihood of identifying etiology in this situation.  If patient were to develop gross hematuria in the interim, the patient would need a total re-evaluation.             Diagnoses and all orders for this visit:    1. Gross hematuria (Primary)  -     CT Abdomen Pelvis With & Without Contrast; Future  -     Cystoscopy    2. OAB (overactive bladder)    3. Recurrent UTI  -     methenamine (HIPREX) 1 g tablet; Take 1 tablet by mouth 2 (Two) Times a Day With Meals.  Dispense: 30 tablet; Refill: 2        -      Guidance PCR urine culture      Follow Up:   Return in about 2 weeks (around 5/15/2024) for f/u 2wks with Dr. Rizzo for CYSTO (w/u hematuria).  f/u in 3-4 wks with TIARRA for Caden and OAB.    I spent approximately 45 minutes providing clinical care for this patient; including review of patient's chart and provider documentation, face to face time spent with patient in examination room (obtaining history, performing physical exam, discussing diagnosis and management options), placing orders, and completing patient documentation.     Vin Garcia PA-C  [facility]

## 2024-05-06 ENCOUNTER — HOSPITAL ENCOUNTER (OUTPATIENT)
Dept: CT IMAGING | Facility: HOSPITAL | Age: 44
Discharge: HOME | End: 2024-05-06
Admitting: PHYSICIAN ASSISTANT
Payer: COMMERCIAL

## 2024-05-06 DIAGNOSIS — R31.0 GROSS HEMATURIA: ICD-10-CM

## 2024-05-06 PROCEDURE — 74178 CT ABD&PLV WO CNTR FLWD CNTR: CPT

## 2024-05-06 PROCEDURE — 25510000001 IOPAMIDOL PER 1 ML: Performed by: PHYSICIAN ASSISTANT

## 2024-05-06 RX ADMIN — IOPAMIDOL 150 ML: 755 INJECTION, SOLUTION INTRAVENOUS at 18:18

## 2024-05-07 ENCOUNTER — TELEPHONE (OUTPATIENT)
Dept: CARDIOLOGY | Facility: CLINIC | Age: 44
End: 2024-05-07
Payer: COMMERCIAL

## 2024-05-16 ENCOUNTER — OFFICE VISIT (OUTPATIENT)
Dept: CARDIOLOGY | Facility: CLINIC | Age: 44
End: 2024-05-16
Payer: COMMERCIAL

## 2024-05-16 VITALS
OXYGEN SATURATION: 92 % | WEIGHT: 293 LBS | BODY MASS INDEX: 50.02 KG/M2 | HEART RATE: 92 BPM | RESPIRATION RATE: 22 BRPM | SYSTOLIC BLOOD PRESSURE: 132 MMHG | DIASTOLIC BLOOD PRESSURE: 88 MMHG | TEMPERATURE: 97.1 F | HEIGHT: 64 IN

## 2024-05-16 DIAGNOSIS — Z72.0 TOBACCO USE: ICD-10-CM

## 2024-05-16 DIAGNOSIS — R07.89 CHEST PRESSURE: ICD-10-CM

## 2024-05-16 DIAGNOSIS — I10 ESSENTIAL HYPERTENSION: ICD-10-CM

## 2024-05-16 DIAGNOSIS — R06.09 DYSPNEA ON EXERTION: ICD-10-CM

## 2024-05-16 DIAGNOSIS — E78.2 MIXED HYPERLIPIDEMIA: Primary | ICD-10-CM

## 2024-05-16 PROCEDURE — 99214 OFFICE O/P EST MOD 30 MIN: CPT | Performed by: INTERNAL MEDICINE

## 2024-05-16 RX ORDER — CARVEDILOL 12.5 MG/1
12.5 TABLET ORAL 2 TIMES DAILY
Qty: 180 TABLET | Refills: 3 | Status: SHIPPED | OUTPATIENT
Start: 2024-05-16 | End: 2025-05-16

## 2024-05-16 RX ORDER — NITROGLYCERIN 0.4 MG/1
TABLET SUBLINGUAL
Qty: 100 TABLET | Refills: 11 | Status: SHIPPED | OUTPATIENT
Start: 2024-05-16

## 2024-05-16 NOTE — ASSESSMENT & PLAN NOTE
Return to the Emergency department for any worsening or failure to improve, otherwise follow up with your primary care provider.    Workup is negative with stress test and coronary CTA.  Patient experienced chest tightness when she placed a nicotine patch on, possibly vasoactive microvascular disease/endothelial dysfunction.  Significant improvement since last visit after initiation of carvedilol.  Chest pressure few weeks back for 1 month, unclear etiology, between 4 and 7 PM, tight gas pain.  Felt from patient hiatal hernia.  Plan:  Smoking cessation discussed extensively  Uptitrate carvedilol to 12.5 mg twice daily for better blood pressure control and 24-hour coverage  Patient to let us know what is time to refill nifedipine, we will refill as 60 mg dosing and uptitrate losartan to 50 mg  Nitroglycerin sublingual provided as needed chest pains as discussed

## 2024-05-16 NOTE — ASSESSMENT & PLAN NOTE
" Lipid abnormalities are worsening    Plan:  Continue same medication/s without change.      Discussed medication dosage, use, side effects, and goals of treatment in detail.    Counseled patient on lifestyle modifications to help control hyperlipidemia.   Cholesterol lowering dietary information shared with patient.  Advised patient to exercise for 150 minutes weekly. (30 minute brisk walk, 5 days a week for example)  Weight Loss encouraged  Stop tobacco use encouraged  Discussed with patient extensively low carbohydrate diet.  Patient started having pop tarts in the morning as \"comfort food\" , around the same time where the lipid profile turned around.  Her kids are living home and this is making her very emotional and making her diet worse.  Discussed extensively and patient agreeable on lowering her carbohydrate diet.    Patient Treatment Goals:   LDL goal is less than 70    Followup in 6 months.  "

## 2024-05-16 NOTE — ASSESSMENT & PLAN NOTE
Hypertension is stable and controlled  Continue current treatment regimen.  Medication changes per orders.  Dietary sodium restriction.  Weight loss.  Regular aerobic exercise.  Stop smoking.  Ambulatory blood pressure monitoring.  Increase carvedilol to 12.5 mg twice daily.  Blood pressure will be reassessed in 1 month and patient will communicate results to us by phone.  Plan to uptitrate losartan and decrease nifedipine.  Goal heart rate 60-70 bpm.

## 2024-05-16 NOTE — ASSESSMENT & PLAN NOTE
Multifactorial.  Lung disease likely in the setting of smoking 2 packs/day, and exam suggestive of bronchitis.  No volume overload on exam.  Echo normal with normal PA pressures.  Blood pressure controlled.  Deconditioning.  Now following with pulmonary.  Plan:  Patient counseled in regards to heart healthy, low fat/ low cholesterol/low sat diet, daily exercise for 30 minutes, low to moderate intensity, and weight loss.  Smoking cessation discussed extensively  Blood pressure management as per hypertension

## 2024-05-16 NOTE — ASSESSMENT & PLAN NOTE
Tobacco use discussed extensively patient smoking 2 packs/day. Explained to patient can bridge herself with lozenges. Patient also can practice a slow wean by 1 cigarette/day every week. Patient agreeable.  Discussed about Chantix, and patient felt that a good choice for her in view of baseline nightmares and anxiety.  Follow-up on smoking status next visit.

## 2024-05-16 NOTE — PROGRESS NOTES
"[facility]  [facility] CARDIOLOGY  [address] [place] [address]  Dept: [phone]  Dept Fax: [phone]    Date: 05/16/2024  Patient: Gina Loomis  YOB: 1980    Follow Up Office Visit Note    Interval Follow-up  Ms. Gina Loomis is a 44 y.o. female who is here for follow-up on Chest Pain and Rapid Heart Rate.    Subjective   Patient doing well from a cardiovascular standpoint.  Significant improvement in chest pains and palpitations.    For one month patient Having chest pressure between 4 and 7 PM resolved since, felt as related to a hiatal hernia.   Patient denies orthopnea, PND,  lightheadedness, syncope or medications side-effects.  Patient still smoking and was unable to wean herself off yet.  Speaking with a therapist and is \"in a better place now\".    The following portions of the patient's history were reviewed and updated as appropriate: allergies, current medications, past family history, past medical history, past social history, past surgical history, and problem list.    Medications:   Allergies   Allergen Reactions    Lisinopril Cough    Claritin-D 24 Hour [Loratadine-Pseudoephedrine Er] Other (See Comments)     Couldn't sleep    Morphine Itching      Current Outpatient Medications   Medication Instructions    albuterol sulfate  (90 Base) MCG/ACT inhaler 2 puffs, Inhalation, Every 4 Hours PRN    carvedilol (COREG) 12.5 mg, Oral, 2 Times Daily    diclofenac (VOLTAREN) 50 mg, Oral, 2 Times Daily    ferrous gluconate (FERGON) 324 mg, Oral, Daily With Breakfast    FLUoxetine (PROZAC) 60 mg, Oral, Daily    hydrOXYzine (ATARAX) 50 mg, Oral, 2 Times Daily PRN    icosapent ethyl (VASCEPA) 2 g, Oral, 2 Times Daily With Meals    levothyroxine (SYNTHROID, LEVOTHROID) 50 mcg, Oral, Daily    losartan (COZAAR) 25 MG tablet 1 tablet, Oral, Daily    meclizine (ANTIVERT) 12.5 mg, Oral, 3 Times Daily PRN    metFORMIN (GLUCOPHAGE) 1,000 mg, " "Oral, 2 Times Daily With Meals    methenamine (HIPREX) 1 g, Oral, 2 Times Daily With Meals    montelukast (SINGULAIR) 10 mg, Oral, Nightly    NIFEdipine CC (ADALAT CC) 90 mg, Oral, Daily    nitroglycerin (NITROSTAT) 0.4 MG SL tablet 1 under the tongue as needed for angina, may repeat every 5 mins for up three doses.  If no resolution of chest pain, call 911 or head to the nearest emergency room.    norethindrone (AYGESTIN) 5 mg, Oral, Daily    omeprazole (PRILOSEC) 20 mg, Oral, 2 Times Daily    ondansetron ODT (ZOFRAN-ODT) 4 mg, Every 8 Hours PRN    rosuvastatin (CRESTOR) 10 mg, Oral, Nightly    Symbicort 160-4.5 MCG/ACT inhaler 2 puffs, Inhalation, 2 Times Daily    triamterene-hydrochlorothiazide (MAXZIDE-25) 37.5-25 MG per tablet 1 tablet, Oral, Daily       Tobacco Use: High Risk (5/16/2024)    Patient History     Smoking Tobacco Use: Every Day     Smokeless Tobacco Use: Never     Passive Exposure: Current        Objective  Vitals:    05/16/24 1335   BP: 132/88   Pulse: 92   Resp: 22   Temp: 97.1 °F (36.2 °C)   SpO2: 92%   Weight: (!) 141 kg (309 lb 12.8 oz)   Height: 162.6 cm (64.02\")   PainSc: 0-No pain      Vitals:    05/16/24 1335   BP: 132/88   Pulse: 92   Resp: 22   Temp: 97.1 °F (36.2 °C)   SpO2: 92%   Weight: (!) 141 kg (309 lb 12.8 oz)   Height: 162.6 cm (64.02\")          Physical Exam  Constitutional:       Appearance: Not in distress.   Neck:      Vascular: JVD normal.   Pulmonary:      Breath sounds: Normal breath sounds.   Cardiovascular:      Normal rate. Regular rhythm.      Murmurs: There is no murmur.   Pulses:     Intact distal pulses.   Edema:     Peripheral edema absent.   Neurological:      Mental Status: Alert.              Diagnostic Data  Lab Results   Component Value Date     04/16/2024    K 4.2 04/16/2024     04/16/2024    CO2 18 (L) 04/16/2024    MG 1.9 10/30/2023    BUN 12 04/16/2024    CREATININE 0.79 04/16/2024    CALCIUM 9.9 04/16/2024    BILITOT 0.2 04/16/2024    ALKPHOS " "47 04/16/2024    ALT 22 04/16/2024    AST 25 04/16/2024    GLUCOSE 124 (H) 04/16/2024    ALBUMIN 4.6 04/16/2024     Lab Results   Component Value Date    WBC 8.3 04/16/2024    HGB 12.6 04/16/2024    HCT 38.7 04/16/2024     04/16/2024     No results found for: \"APTT\", \"INR\", \"PTT\"  Lab Results   Component Value Date    TROPONINT 6 10/31/2023     Lab Results   Component Value Date    PROBNP <36 04/23/2024       Lab Results   Component Value Date    CHLPL 124 04/16/2024    TRIG 269 (H) 04/16/2024    HDL 35 (L) 04/16/2024    LDL 47 04/16/2024     Lab Results   Component Value Date    TSH 1.840 04/16/2024    FREET4 1.27 04/28/2022       CV Diagnostics:  Procedures    CXR: No results found for this or any previous visit.     ECHO/MUGA: Results for orders placed during the hospital encounter of 01/26/23    Adult Transthoracic Echo Complete W/ Cont if Necessary Per Protocol    Interpretation Summary    Left ventricular systolic function is normal. Estimated left ventricular EF = 55%    Trace mitral regurgitation.    Trace tricuspid regurgitation with normal RVSP.     STRESS TESTS: Results for orders placed during the hospital encounter of 01/26/23    Stress Test With Myocardial Perfusion One Day    Interpretation Summary    Lexiscan stress test completed without complications    The test is negative for typical angina or ischemic ST segment changes.  The patient reported constant left-sided dull ache which resolved in recovery.    Diaphragmatic attenuation and GI artifacts are present.    Myocardial perfusion imaging indicates a normal myocardial perfusion study with no evidence of ischemia.    Left ventricular ejection fraction is hyperdynamic (Calculated EF > 70%).    Impressions are consistent with a low risk study.     CARDIAC CATH: No results found for this or any previous visit.     DEVICES: No valid procedures specified.   HOLTER: Results for orders placed in visit on 03/20/23    Holter Monitor - 48 " Hour    Interpretation Summary    A normal monitor study.    Rare PAC`S and PVC`S    Max . Min Hr 76 and average     Complaints of skip beats correlates with single PAC`S    Heart racing and fatigue sinus rhythm and sinus tachycardia     CT/MRI:  Results for orders placed during the hospital encounter of 02/23/24    CT Angiogram Coronary    Narrative  CT ANGIOGRAM CORONARY    Date of Exam: 2/23/2024 2:28 PM EST    Indication: Chest pain/anginal equivalent, intermediate CAD risk, not treadmill candidate  chest pain continued complaints, smoker, diabetes mellitus.    Comparison: None available.    Technique: CTA of the chest was performed after the uneventful intravenous administration of 100 cc of Isovue-370 contrast. Reconstructed coronal and sagittal images were also obtained. In addition, a 3-D volume rendered image was created for  interpretation. Automated exposure control and iterative reconstruction methods were used.      Findings:  The visualized hilar and mediastinal regions are normal. The visualized pleural spaces are clear. There are bandlike linear densities in the lower lobes representing subsegmental atelectasis versus small pulmonary scars. There is degenerative spondylosis  in the visualized thoracic spine. There are no acute findings beneath the hemidiaphragms.    Impression  Impression:  No significant noncardiac CT findings.    Please refer to CT Angiogram Coronary   Cardiology interpretation report for information on cardiac structures.    Electronically Signed: Mark Murphy MD  2/25/2024 5:56 PM EST  Workstation ID: [id number]    VASCULAR: No valid procedures specified.     Assessment and Plan  Diagnoses and all orders for this visit:    1. Mixed hyperlipidemia (Primary)  Assessment & Plan:   Lipid abnormalities are worsening    Plan:  Continue same medication/s without change.      Discussed medication dosage, use, side effects, and goals of treatment in detail.    Counseled patient  "on lifestyle modifications to help control hyperlipidemia.   Cholesterol lowering dietary information shared with patient.  Advised patient to exercise for 150 minutes weekly. (30 minute brisk walk, 5 days a week for example)  Weight Loss encouraged  Stop tobacco use encouraged  Discussed with patient extensively low carbohydrate diet.  Patient started having pop tarts in the morning as \"comfort food\" , around the same time where the lipid profile turned around.  Her kids are living home and this is making her very emotional and making her diet worse.  Discussed extensively and patient agreeable on lowering her carbohydrate diet.    Patient Treatment Goals:   LDL goal is less than 70    Followup in 6 months.      2. Chest pressure  Assessment & Plan:  Workup is negative with stress test and coronary CTA.  Patient experienced chest tightness when she placed a nicotine patch on, possibly vasoactive microvascular disease/endothelial dysfunction.  Significant improvement since last visit after initiation of carvedilol.  Chest pressure few weeks back for 1 month, unclear etiology, between 4 and 7 PM, tight gas pain.  Felt from patient hiatal hernia.  Plan:  Smoking cessation discussed extensively  Uptitrate carvedilol to 12.5 mg twice daily for better blood pressure control and 24-hour coverage  Patient to let us know what is time to refill nifedipine, we will refill as 60 mg dosing and uptitrate losartan to 50 mg  Nitroglycerin sublingual provided as needed chest pains as discussed    Orders:  -     carvedilol (COREG) 12.5 MG tablet; Take 1 tablet by mouth 2 (Two) Times a Day.  Dispense: 180 tablet; Refill: 3  -     nitroglycerin (NITROSTAT) 0.4 MG SL tablet; 1 under the tongue as needed for angina, may repeat every 5 mins for up three doses.  If no resolution of chest pain, call 911 or head to the nearest emergency room.  Dispense: 100 tablet; Refill: 11    3. Essential hypertension  Assessment & Plan:  Hypertension is " stable and controlled  Continue current treatment regimen.  Medication changes per orders.  Dietary sodium restriction.  Weight loss.  Regular aerobic exercise.  Stop smoking.  Ambulatory blood pressure monitoring.  Increase carvedilol to 12.5 mg twice daily.  Blood pressure will be reassessed in 1 month and patient will communicate results to us by phone.  Plan to uptitrate losartan and decrease nifedipine.  Goal heart rate 60-70 bpm.    Orders:  -     carvedilol (COREG) 12.5 MG tablet; Take 1 tablet by mouth 2 (Two) Times a Day.  Dispense: 180 tablet; Refill: 3    4. Dyspnea on exertion  Assessment & Plan:  Multifactorial.  Lung disease likely in the setting of smoking 2 packs/day, and exam suggestive of bronchitis.  No volume overload on exam.  Echo normal with normal PA pressures.  Blood pressure controlled.  Deconditioning.  Now following with pulmonary.  Plan:  Patient counseled in regards to heart healthy, low fat/ low cholesterol/low sat diet, daily exercise for 30 minutes, low to moderate intensity, and weight loss.  Smoking cessation discussed extensively  Blood pressure management as per hypertension      5. Tobacco use  Assessment & Plan:  Tobacco use discussed extensively patient smoking 2 packs/day. Explained to patient can bridge herself with lozenges. Patient also can practice a slow wean by 1 cigarette/day every week. Patient agreeable.  Discussed about Chantix, and patient felt that a good choice for her in view of baseline nightmares and anxiety.  Follow-up on smoking status next visit.            Return in about 6 months (around 11/16/2024) for Follow-up with Dr Naylor.    There are no Patient Instructions on file for this visit.    Trace Naylor MD

## 2024-05-16 NOTE — LETTER
"  May 16, 2024    Gina Loomis  [address] [facility] [address]    Echo results:      [facility] NONINVASIVE LAB  [address]  3rd FLOOR  [facility] [address] [phone]            Patient Information    Name MRN Description   Gina Loomis [id number] 42 y.o. female       Complete Transthoracic Echocardiogram with Complete Doppler and Color Flow    Accession Number:  [id number]   Date of Study:  23   Ordering Provider:  Charito Zapata APRN   Clinical Indications:  Dyspnea        Reading Physicians  Performing Staff   Cardiology:  Corrie Gabriel MD    Tech:  Charlene Simmons, [facility]            Patient Hx Of Height, Weight, and Vitals    Height Weight BSA (Calculated - sq m) BMI (Calculated) Retired BMI (kg/m2) Pulse BP   162.6 cm (64\") 143 kg (315 lb) 2.37 sq meters 54  101 110/70       Blood Pressure at Time of Exam     Right Arm   Blood Pressure 110/70 mmHg              Cardiac History    Diagnosis Date Comment Source   Diabetes mellitus      Hyperlipidemia 2013     Hypertension      Preeclampsia 2004         Social History      Tobacco Use    Every Day; Cigarettes: Started 1993; 1 pack/day; Smoked an average of 1 pack/day for 31.4 years   Passive Exposure: Current   Smokeless Tobacco: Never used smokeless tobacco.     Vaping Use    Never used          Family Cardiac History as of 2023  Pedigree   Problem Relation Age of Onset   Coronary artery disease Father    Coronary artery disease Mother    Diabetes Father    Diabetes Maternal Grandmother    Diabetes Paternal Grandmother    Diabetes Sister    Hyperlipidemia Father    Hypertension Father    Hypertension Mother        Interpretation Summary       •  Left ventricular systolic function is normal. Estimated left ventricular EF = 55%  •  Trace mitral regurgitation.  •  Trace tricuspid regurgitation with normal RVSP.               Additional Study Details    Study Quality The study is " technically difficult for diagnosis. The quality of the study is limited due to patient body habitus.       Echocardiogram Findings    Left Ventricle Left ventricular systolic function is normal. Estimated left ventricular EF = 55%     Normal left ventricular cavity size and wall thickness noted. All left ventricular wall segments contract normally.   Right Ventricle Normal right ventricular cavity size, wall thickness, systolic function and septal motion noted.   Left Atrium Normal left atrial size and volume noted.   Right Atrium Normal right atrial cavity size noted.   Aortic Valve The aortic valve is structurally normal with no regurgitation or stenosis present.   Mitral Valve The mitral valve is structurally normal with no significant stenosis present. Trace mitral valve regurgitation is present.   Tricuspid Valve The tricuspid valve is structurally normal with no significant stenosis present. Trace tricuspid valve regurgitation is present. Estimated right ventricular systolic pressure from tricuspid regurgitation is normal (<35 mmHg).   Pulmonic Valve The pulmonic valve is structurally normal with no significant stenosis present. There is no significant pulmonic valve regurgitation present.   Greater Vessels No dilation of the aortic root is present. No dilation of the sinuses of Valsalva is present.   Pericardium The pericardium is normal. There is no evidence of pericardial effusion. .                LV Measurements    Dimensions   LVIDd 4.6 cm         IVSd 1.19 cm         FS 37.5 %         ESV(cubed) 23.1 ml         EDV(cubed) 94.2 ml         LVOT area 3.3 cm2         LVOT diam 2.05 cm         EDV(MOD-sp2) 74.9 ml         ESV(MOD-sp2) 31.3 ml         Diastolic Filling   MV E max donald 129 cm/sec         MV A max donald 102.8 cm/sec         MV dec time 0.19 msec         MV E/A 1.25          LA ESV Index (BP) 19.8 ml/m2         Med Peak E' Donald 7.8 cm/sec         Lat Peak E' Donald 15.2 cm/sec         Avg E/e' ratio  11.22          Shunt Ratio   SV(LVOT) 89.1 ml          Dimensions   LVIDs 2.8 cm         LVPWd 1.12 cm         IVS/LVPW 1.07 cm         LV Sys Vol (BSA corrected) 31.5 cm2         LV Nixon Vol (BSA corrected) 61.3 cm2         LV mass(C)d 190.7 grams         EDV(MOD-sp4) 144 ml         ESV(MOD-sp4) 74.1 ml         Systolic Function   SV(MOD-sp2) 43.6 ml         SV(MOD-sp4) 69.9 ml         SVi(MOD-SP2) 18.6 ml/m2         SVi(MOD-SP4) 29.7 ml/m2         EF(MOD-sp2) 58.2 %         EF(MOD-sp4) 48.5 %         EF(MOD-bp) 57.1 %                Right Ventricle Measurements    RV Base 3.3 cm         RV Mid 2.7 cm         RV Length 8.2 cm          TAPSE (>1.6) 2.49 cm         RV S' 20.8 cm/sec                LA Measurements    LA Dimensions   LA dimension (2D)  4 cm         LA ESV Index (BP) 19.8 ml/m2                 Aortic Valve Measurements    Stenosis   LVOT diam 2.05 cm         LV V1 max 125.3 cm/sec         LV V1 max PG 6.3 mmHg         LV V1 mean PG 3.4 mmHg         LV V1 VTI 27 cm         Ao pk mira 171.9 cm/sec          Stenosis   Ao max PG 11.9 mmHg         Ao mean PG 6.8 mmHg         Ao V2 VTI 34.7 cm         HAYLEY(I,D) 2.6 cm2                Mitral Valve Measurements    Stenosis   MV max PG 8.4 mmHg         MV mean PG 4.9 mmHg         MV V2 VTI 28.8 cm         MV P1/2t 51.2 msec         MVA(P1/2t) 4.3 cm2         MVA(VTI) 3.1 cm2         MV dec slope 721.7 cm/sec2         MV dec time 0.19 msec                 Tricuspid Valve Measurements    Regurgitation   TR max mira 229.4 cm/sec         TR max PG 21.1 mmHg         RVSP(TR) 24 mmHg         RAP systole 3 mmHg         PISA   TR max mira 229.4 cm/sec         TR max PG 21.1 mmHg                 Pulmonic Valve Measurements    Stenosis   PA V2 max 104.5 cm/sec         PA acc time 0.14 sec          Regurgitation   PA pr(Accel) 17.2 mmHg                Greater Vessels Measurements    Ao root diam 3.4 cm                Signed    Electronically signed by Corrie Gabriel MD on  1/26/23 at 1253 EST       Reading Providers     Reading Role Read Date   Corrie Gabriel MD Cardiology 1/26/2023                             Trace Naylor MD

## 2024-05-17 ENCOUNTER — PROCEDURE VISIT (OUTPATIENT)
Dept: UROLOGY | Facility: CLINIC | Age: 44
End: 2024-05-17
Payer: COMMERCIAL

## 2024-05-17 VITALS
DIASTOLIC BLOOD PRESSURE: 82 MMHG | HEIGHT: 64 IN | WEIGHT: 293 LBS | HEART RATE: 86 BPM | BODY MASS INDEX: 50.02 KG/M2 | OXYGEN SATURATION: 99 % | SYSTOLIC BLOOD PRESSURE: 142 MMHG

## 2024-05-17 DIAGNOSIS — N32.81 OAB (OVERACTIVE BLADDER): Primary | ICD-10-CM

## 2024-05-17 LAB
BILIRUB BLD-MCNC: NEGATIVE MG/DL
CLARITY, POC: CLEAR
COLOR UR: YELLOW
EXPIRATION DATE: ABNORMAL
GLUCOSE UR STRIP-MCNC: NEGATIVE MG/DL
KETONES UR QL: NEGATIVE
LEUKOCYTE EST, POC: NEGATIVE
Lab: ABNORMAL
NITRITE UR-MCNC: NEGATIVE MG/ML
PH UR: 6 [PH] (ref 5–8)
PROT UR STRIP-MCNC: NEGATIVE MG/DL
RBC # UR STRIP: ABNORMAL /UL
SP GR UR: 1.01 (ref 1–1.03)
UROBILINOGEN UR QL: NORMAL

## 2024-05-17 RX ORDER — SOLIFENACIN SUCCINATE 10 MG/1
10 TABLET, FILM COATED ORAL DAILY
Qty: 30 TABLET | Refills: 2 | Status: SHIPPED | OUTPATIENT
Start: 2024-05-17

## 2024-05-17 NOTE — PROGRESS NOTES
Preprocedure diagnosis  hematuria    Postprocedure diagnosis  same    Procedure  Flexible Cystourethroscopy    Attending surgeon  Connie Rizzo MD    Anesthesia  2% lidocaine jelly intraurethrally    Complications  None    Indications  44 y.o. female undergoing a flexible cystoscopy for the above mentioned indications.      Informed consent was obtained prior to the procedure start.       Findings  Cystoscopy revealed normal bladder mucosa with NO tumors, masses, stones or trabeculations noted.      Procedure  The patient was placed in supine position and prepped and draped in sterile fashion with lidocaine jelly instilled 5 minutes pre-procedure start.  A brief timeout including available nursing staff and awake patient was performed.  The 16 Fr digital flexible cystoscope was lubricated and gently placed into the urethral meatus. The proximal and distal portions of the urethra appeared well vascularized and normal in appearance. The bladder neck was visualized and appeared well vascularized without mucosal lesions or abnormal appearance. The bladder was then entered and  completely visualized including the trigone. There were bilateral orthotopic ureteral orifices which appeared patent and effluxed clear yellow urine. The posterior wall, lateral walls, anterior wall, and dome were visualized. The cystoscope was then retroflexed and the bladder neck was further visualized and appeared normal.  The scope was gently withdrawn and the procedure terminated.  The patient tolerated the procedure well.       I discussed that she has had a negative hematuria workup at this point.  She does complain of overactive bladder and nighttime incontinence.  She has not been on any medication for this.  I will send in a prescription for Vesicare.  I discussed the risks and side effects.  We also discussed weight loss and smoking cessation to help with her urinary symptoms.  I will have her follow-up as scheduled.  If the Vesicare  is not helping her then we can move forward with a different medication.  Due to her BMI she would not be a candidate for PNE or SNS.

## 2024-05-22 RX ORDER — NIFEDIPINE 90 MG/1
90 TABLET, FILM COATED, EXTENDED RELEASE ORAL DAILY
Qty: 90 TABLET | Refills: 1 | Status: SHIPPED | OUTPATIENT
Start: 2024-05-22

## 2024-06-03 ENCOUNTER — TELEPHONE (OUTPATIENT)
Dept: UROLOGY | Facility: CLINIC | Age: 44
End: 2024-06-03
Payer: COMMERCIAL

## 2024-06-03 NOTE — TELEPHONE ENCOUNTER
Patient states that she had a scope with you on 5/17 and everything looked good but she has been urinating blood for several hours today.  She is going to go to the [facility] to be checked for a uti, but wanted to let you know that she has had another episode of hematuria.

## 2024-06-25 ENCOUNTER — LAB (OUTPATIENT)
Dept: FAMILY MEDICINE CLINIC | Facility: CLINIC | Age: 44
End: 2024-06-25
Payer: COMMERCIAL

## 2024-06-26 DIAGNOSIS — E78.2 MIXED HYPERLIPIDEMIA: ICD-10-CM

## 2024-06-26 LAB
BASOPHILS # BLD AUTO: 0 X10E3/UL (ref 0–0.2)
BASOPHILS NFR BLD AUTO: 1 %
EOSINOPHIL # BLD AUTO: 0.1 X10E3/UL (ref 0–0.4)
EOSINOPHIL NFR BLD AUTO: 2 %
ERYTHROCYTE [DISTWIDTH] IN BLOOD BY AUTOMATED COUNT: 13.9 % (ref 11.7–15.4)
FERRITIN SERPL-MCNC: 50 NG/ML (ref 15–150)
HCT VFR BLD AUTO: 38 % (ref 34–46.6)
HGB BLD-MCNC: 13 G/DL (ref 11.1–15.9)
IMM GRANULOCYTES # BLD AUTO: 0 X10E3/UL (ref 0–0.1)
IMM GRANULOCYTES NFR BLD AUTO: 0 %
IRON SATN MFR SERPL: 19 % (ref 15–55)
IRON SERPL-MCNC: 68 UG/DL (ref 27–159)
LYMPHOCYTES # BLD AUTO: 1.8 X10E3/UL (ref 0.7–3.1)
LYMPHOCYTES NFR BLD AUTO: 28 %
MCH RBC QN AUTO: 30.9 PG (ref 26.6–33)
MCHC RBC AUTO-ENTMCNC: 34.2 G/DL (ref 31.5–35.7)
MCV RBC AUTO: 90 FL (ref 79–97)
MONOCYTES # BLD AUTO: 0.5 X10E3/UL (ref 0.1–0.9)
MONOCYTES NFR BLD AUTO: 7 %
NEUTROPHILS # BLD AUTO: 4 X10E3/UL (ref 1.4–7)
NEUTROPHILS NFR BLD AUTO: 62 %
PLATELET # BLD AUTO: 268 X10E3/UL (ref 150–450)
RBC # BLD AUTO: 4.21 X10E6/UL (ref 3.77–5.28)
TIBC SERPL-MCNC: 361 UG/DL (ref 250–450)
UIBC SERPL-MCNC: 293 UG/DL (ref 131–425)
WBC # BLD AUTO: 6.4 X10E3/UL (ref 3.4–10.8)

## 2024-06-26 RX ORDER — ROSUVASTATIN CALCIUM 10 MG/1
10 TABLET, COATED ORAL NIGHTLY
Qty: 90 TABLET | Refills: 1 | Status: SHIPPED | OUTPATIENT
Start: 2024-06-26

## 2024-06-26 NOTE — TELEPHONE ENCOUNTER
Caller: Gina Loomis    Relationship: Self    Best call back number: [phone]    Requested Prescriptions:   Requested Prescriptions     Pending Prescriptions Disp Refills    rosuvastatin (CRESTOR) 10 MG tablet 90 tablet 1     Sig: Take 1 tablet by mouth Every Night.        Pharmacy where request should be sent: [facility] [address] [place] [address] [phone] [facility] [phone] FX     Last office visit with prescribing clinician: 5/16/2024   Last telemedicine visit with prescribing clinician: Visit date not found   Next office visit with prescribing clinician: 11/18/2024       Does the patient have less than a 3 day supply:  [x] Yes  [] No    Would you like a call back once the refill request has been completed: [] Yes [x] No    If the office needs to give you a call back, can they leave a voicemail: [] Yes [x] No    Galina Nicole Rep   06/26/24 11:20 EDT

## 2024-06-27 ENCOUNTER — TELEPHONE (OUTPATIENT)
Dept: ONCOLOGY | Facility: CLINIC | Age: 44
End: 2024-06-27
Payer: COMMERCIAL

## 2024-06-27 NOTE — TELEPHONE ENCOUNTER
TRIED TO W/T TO OFFICE BUT WAS UNSUCCESSFUL    Caller: Gina Loomis    Relationship to patient: Self    Best call back number: [phone]    Type of visit: F/U    Requested date: 7/1-7/3 IF POSSIBLE AROUND 11 AM OR LATER    If rescheduling, when is the original appointment: 6/27

## 2024-07-07 DIAGNOSIS — N39.0 RECURRENT UTI: ICD-10-CM

## 2024-07-08 ENCOUNTER — TELEPHONE (OUTPATIENT)
Dept: CARDIOLOGY | Facility: CLINIC | Age: 44
End: 2024-07-08
Payer: COMMERCIAL

## 2024-07-08 RX ORDER — METHENAMINE HIPPURATE 1000 MG/1
1 TABLET ORAL 2 TIMES DAILY WITH MEALS
Qty: 60 TABLET | Refills: 3 | Status: SHIPPED | OUTPATIENT
Start: 2024-07-08

## 2024-07-08 NOTE — TELEPHONE ENCOUNTER
Rx Refill Note  Requested Prescriptions     Pending Prescriptions Disp Refills    methenamine (HIPREX) 1 g tablet [Pharmacy Med Name: Methenamine Hippurate 1 GM Oral Tablet] 30 tablet 0     Sig: TAKE 1 TABLET BY MOUTH TWICE DAILY WITH MEALS      Last office visit with prescribing clinician: Visit date not found   Next office visit with prescribing clinician: 09/05/2024      Sheila Romo MA  07/08/24, 08:59 EDT

## 2024-07-08 NOTE — TELEPHONE ENCOUNTER
REFILL AUTHORIZATION REQUEST  Drug: ROSUVASTATIN 10 MG TAB  Qty: 90  Directions: Take 1 tablet by mouth once daily at night  Written Date: 12/29/23  Last Fill Date: 03/24/24    [facility] [address] [place] [address] [phone] [place] [phone]

## 2024-07-09 DIAGNOSIS — E78.2 MIXED HYPERLIPIDEMIA: ICD-10-CM

## 2024-07-09 RX ORDER — ROSUVASTATIN CALCIUM 10 MG/1
10 TABLET, COATED ORAL NIGHTLY
Qty: 90 TABLET | Refills: 3 | Status: SHIPPED | OUTPATIENT
Start: 2024-07-09

## 2024-07-10 RX ORDER — OMEGA-3-ACID ETHYL ESTERS 1 G/1
2 CAPSULE, LIQUID FILLED ORAL 2 TIMES DAILY
COMMUNITY
End: 2024-07-10 | Stop reason: SDUPTHER

## 2024-07-10 RX ORDER — OMEGA-3-ACID ETHYL ESTERS 1 G/1
2 CAPSULE, LIQUID FILLED ORAL 2 TIMES DAILY
Qty: 360 CAPSULE | Refills: 3 | Status: SHIPPED | OUTPATIENT
Start: 2024-07-10

## 2024-07-10 NOTE — TELEPHONE ENCOUNTER
Left a message for Ms. Loomis to call back and let us know if you have ever tried the omega 3, Lovaza, medication.  Your vascepa is not on your formulary and is being denied.  Want to make sure no contraindication to the omega 3.  Thank you.

## 2024-07-12 ENCOUNTER — OFFICE VISIT (OUTPATIENT)
Dept: ONCOLOGY | Facility: CLINIC | Age: 44
End: 2024-07-12
Payer: COMMERCIAL

## 2024-07-12 VITALS
OXYGEN SATURATION: 97 % | BODY MASS INDEX: 50.02 KG/M2 | HEIGHT: 64 IN | HEART RATE: 82 BPM | RESPIRATION RATE: 20 BRPM | TEMPERATURE: 97.5 F | SYSTOLIC BLOOD PRESSURE: 127 MMHG | DIASTOLIC BLOOD PRESSURE: 65 MMHG | WEIGHT: 293 LBS

## 2024-07-12 DIAGNOSIS — D50.8 IRON DEFICIENCY ANEMIA SECONDARY TO INADEQUATE DIETARY IRON INTAKE: ICD-10-CM

## 2024-07-12 DIAGNOSIS — D50.9 IRON DEFICIENCY ANEMIA, UNSPECIFIED IRON DEFICIENCY ANEMIA TYPE: ICD-10-CM

## 2024-07-12 DIAGNOSIS — K90.9 IRON MALABSORPTION: Primary | ICD-10-CM

## 2024-07-12 PROCEDURE — 99213 OFFICE O/P EST LOW 20 MIN: CPT | Performed by: INTERNAL MEDICINE

## 2024-07-12 RX ORDER — FERROUS GLUCONATE 324(38)MG
1 TABLET ORAL
Qty: 30 TABLET | Refills: 2 | Status: SHIPPED | OUTPATIENT
Start: 2024-07-12

## 2024-07-12 NOTE — PROGRESS NOTES
PROBLEM LIST:    1.  Iron deficiency anemia secondary to malabsorption due to chronic PPI use      REASON FOR VISIT: Iron deficiency    HISTORY OF PRESENT ILLNESS:   44 y.o.  female presents today for follow-up after undergoing lab work.  She was treated with IV iron at our last visit.  She is doing much better since then.    Past medical history, social history and family history was reviewed 07/12/24 and unchanged from prior visit.    Review of Systems:    Review of Systems - Oncology         Medications:        Current Outpatient Medications:     albuterol sulfate  (90 Base) MCG/ACT inhaler, Inhale 2 puffs Every 4 (Four) Hours As Needed for Wheezing., Disp: 18 g, Rfl: 5    carvedilol (COREG) 12.5 MG tablet, Take 1 tablet by mouth 2 (Two) Times a Day., Disp: 180 tablet, Rfl: 3    diclofenac (VOLTAREN) 50 MG EC tablet, Take 1 tablet by mouth 2 (Two) Times a Day., Disp: 60 tablet, Rfl: 5    ferrous gluconate (FERGON) 324 MG tablet, Take 1 tablet by mouth once daily with breakfast, Disp: 30 tablet, Rfl: 2    FLUoxetine (PROzac) 20 MG capsule, Take 3 capsules by mouth Daily., Disp: 90 capsule, Rfl: 5    hydrOXYzine (ATARAX) 50 MG tablet, Take 1 tablet by mouth 2 (Two) Times a Day As Needed for Anxiety., Disp: , Rfl:     levothyroxine (SYNTHROID, LEVOTHROID) 50 MCG tablet, Take 1 tablet by mouth Daily., Disp: 90 tablet, Rfl: 1    losartan (COZAAR) 25 MG tablet, Take 1 tablet by mouth Daily., Disp: , Rfl:     meclizine (ANTIVERT) 25 MG tablet, Take 0.5 tablets by mouth 3 (Three) Times a Day As Needed for Dizziness., Disp: , Rfl:     metFORMIN (GLUCOPHAGE) 1000 MG tablet, Take 1 tablet by mouth 2 (Two) Times a Day With Meals., Disp: 180 tablet, Rfl: 1    methenamine (HIPREX) 1 g tablet, TAKE 1 TABLET BY MOUTH TWICE DAILY WITH MEALS, Disp: 60 tablet, Rfl: 3    montelukast (SINGULAIR) 10 MG tablet, Take 1 tablet by mouth Every Night., Disp: 90 tablet, Rfl: 1    NIFEdipine CC (ADALAT CC) 90 MG 24 hr tablet, Take 1  "tablet by mouth Daily., Disp: 90 tablet, Rfl: 1    nitroglycerin (NITROSTAT) 0.4 MG SL tablet, 1 under the tongue as needed for angina, may repeat every 5 mins for up three doses.  If no resolution of chest pain, call 911 or head to the nearest emergency room., Disp: 100 tablet, Rfl: 11    norethindrone (AYGESTIN) 5 MG tablet, Take 1 tablet by mouth Daily., Disp: 30 tablet, Rfl: 0    omega-3 acid ethyl esters (LOVAZA) 1 g capsule, Take 2 capsules by mouth 2 (Two) Times a Day., Disp: 360 capsule, Rfl: 3    omeprazole (priLOSEC) 20 MG capsule, Take 1 capsule by mouth 2 (Two) Times a Day., Disp: 60 capsule, Rfl: 5    ondansetron ODT (ZOFRAN-ODT) 4 MG disintegrating tablet, 1 tablet Every 8 (Eight) Hours As Needed for Nausea or Vomiting., Disp: , Rfl:     rosuvastatin (CRESTOR) 10 MG tablet, Take 1 tablet by mouth Every Night., Disp: 90 tablet, Rfl: 3    solifenacin (VESICARE) 10 MG tablet, Take 1 tablet by mouth Daily., Disp: 30 tablet, Rfl: 2    Symbicort 160-4.5 MCG/ACT inhaler, Inhale 2 puffs 2 (Two) Times a Day., Disp: , Rfl:     triamterene-hydrochlorothiazide (MAXZIDE-25) 37.5-25 MG per tablet, Take 1 tablet by mouth Daily., Disp: 30 tablet, Rfl: 5    Pain Medications               diclofenac (VOLTAREN) 50 MG EC tablet Take 1 tablet by mouth 2 (Two) Times a Day.    FLUoxetine (PROzac) 20 MG capsule Take 3 capsules by mouth Daily.               ALLERGIES:    Allergies   Allergen Reactions    Lisinopril Cough    Claritin-D 24 Hour [Loratadine-Pseudoephedrine Er] Other (See Comments)     Couldn't sleep    Loratadine Anxiety    Morphine Itching    Pseudoephedrine Anxiety         Physical Exam    VITAL SIGNS:  /65 Comment: Left Wrist  Pulse 82   Temp 97.5 °F (36.4 °C) (Infrared)   Resp 20   Ht 162.6 cm (64\")   Wt (!) 137 kg (303 lb)   SpO2 97% Comment: RA  BMI 52.01 kg/m²     ECOG score: 0           Wt Readings from Last 3 Encounters:   07/12/24 (!) 137 kg (303 lb)   05/17/24 (!) 140 kg (309 lb) "   05/16/24 (!) 141 kg (309 lb 12.8 oz)       Body mass index is 52.01 kg/m². Body surface area is 2.33 meters squared.       Performance Status: 0    General: well appearing, in no acute distress  HEENT: sclera anicteric, neck is supple  Lymphatics: no cervical, supraclavicular, or axillary adenopathy  Cardiovascular: regular rate and rhythm, no murmurs, rubs or gallops  Lungs: clear to auscultation bilaterally  Abdomen: soft, nontender, nondistended.  No palpable organomegaly  Extremities: no lower extremity edema  Skin: no rashes, lesions, bruising, or petechiae  Msk:  Shows no weakness of the large muscle groups  Psych: Mood is stable        RECENT LABS:    Lab Results   Component Value Date    HGB 13.0 06/25/2024    HCT 38.0 06/25/2024    MCV 90 06/25/2024     06/25/2024    WBC 6.4 06/25/2024    NEUTROABS 4.0 06/25/2024    LYMPHSABS 1.8 06/25/2024    MONOSABS 0.5 06/25/2024    EOSABS 0.1 06/25/2024    BASOSABS 0.0 06/25/2024       Lab Results   Component Value Date    GLUCOSE 124 (H) 04/16/2024    BUN 12 04/16/2024    CREATININE 0.79 04/16/2024     04/16/2024    K 4.2 04/16/2024     04/16/2024    CO2 18 (L) 04/16/2024    CALCIUM 9.9 04/16/2024    ALBUMIN 4.6 04/16/2024    BILITOT 0.2 04/16/2024    ALKPHOS 47 04/16/2024    AST 25 04/16/2024    ALT 22 04/16/2024     Lab Results   Component Value Date    HGB 13.0 06/25/2024    HGB 12.6 04/16/2024    HGB 11.1 (L) 03/01/2024     Lab Results   Component Value Date    FERRITIN 50 06/25/2024    FERRITIN 11.73 (L) 02/23/2024    FERRITIN 7 (L) 01/26/2024     Lab Results   Component Value Date    MCV 90 06/25/2024    MCV 84 04/16/2024    MCV 80.5 03/01/2024     Lab Results   Component Value Date    TIBC 361 06/25/2024    TIBC 629 (H) 02/23/2024    TIBC 531 (H) 01/26/2024     Lab Results   Component Value Date    LABIRON 19 06/25/2024    LABIRON 4 (L) 02/23/2024    LABIRON 5 (L) 01/26/2024     Lab Results   Component Value Date    IRON 26 (L) 02/23/2024        Lab Results   Component Value Date    [id number] 11/20/2023         CT Abdomen Pelvis With & Without Contrast    Result Date: 5/9/2024  Impression: 1. No evidence for obstructive uropathy or renal nephrolithiasis. Electronically Signed: Jocelyn Okeefe MD  5/9/2024 9:13 AM EDT  Workstation ID: [id number]          Assessment/Plan    1.  Iron deficiency anemia secondary to malabsorption.  Her pica has resolved since undergoing IV iron infusion.  I will recheck her in 6 months to make sure she is doing well and not requiring more IV iron.        Sophie Flores MD  [facility]    Return in (Approximately): 6 months    Orders Placed This Encounter   Procedures    Iron Profile    Ferritin    CBC & Differential       7/12/2024     Future Appointments         Provider Department Center    9/5/2024 3:30 PM (Arrive by 3:15 PM) Heather Saenz APRN [facility] [place]    11/18/2024 2:30 PM Trace Naylor MD [facility] [place]    1/10/2025 2:30 PM (Arrive by 2:15 PM) Sophie Flores MD [facility]AT[facility] [place]

## 2024-08-08 DIAGNOSIS — N32.81 OAB (OVERACTIVE BLADDER): ICD-10-CM

## 2024-08-08 RX ORDER — SOLIFENACIN SUCCINATE 10 MG/1
10 TABLET, FILM COATED ORAL DAILY
Qty: 30 TABLET | Refills: 0 | Status: SHIPPED | OUTPATIENT
Start: 2024-08-08

## 2024-08-20 DIAGNOSIS — N32.81 OAB (OVERACTIVE BLADDER): ICD-10-CM

## 2024-08-20 NOTE — TELEPHONE ENCOUNTER
Caller: Gina Loomis    Relationship: Self    Best call back number: [phone]    Requested Prescriptions:   Requested Prescriptions     Pending Prescriptions Disp Refills    solifenacin (VESICARE) 10 MG tablet 30 tablet 0     Sig: Take 1 tablet by mouth Daily.        Pharmacy where request should be sent:    [id number]    Last office visit with prescribing clinician: 5/1/2024   Last telemedicine visit with prescribing clinician: Visit date not found   Next office visit with prescribing clinician: Visit date not found     Additional details provided by patient: PT HAS 1 PILL LEFT    Does the patient have less than a 3 day supply:  [x] Yes  [] No    Would you like a call back once the refill request has been completed: [] Yes [x] No    If the office needs to give you a call back, can they leave a voicemail: [] Yes [x] No    Galina Dawson Rep   08/20/24 14:06 EDT

## 2024-08-21 RX ORDER — SOLIFENACIN SUCCINATE 10 MG/1
10 TABLET, FILM COATED ORAL DAILY
Qty: 30 TABLET | Refills: 0 | Status: SHIPPED | OUTPATIENT
Start: 2024-08-21

## 2024-10-23 ENCOUNTER — OFFICE VISIT (OUTPATIENT)
Dept: FAMILY MEDICINE CLINIC | Facility: CLINIC | Age: 44
End: 2024-10-23
Payer: COMMERCIAL

## 2024-10-23 VITALS
SYSTOLIC BLOOD PRESSURE: 126 MMHG | OXYGEN SATURATION: 98 % | BODY MASS INDEX: 50.02 KG/M2 | WEIGHT: 293 LBS | HEART RATE: 76 BPM | HEIGHT: 64 IN | DIASTOLIC BLOOD PRESSURE: 78 MMHG

## 2024-10-23 DIAGNOSIS — M79.672 LEFT FOOT PAIN: Primary | ICD-10-CM

## 2024-10-23 DIAGNOSIS — R05.1 ACUTE COUGH: ICD-10-CM

## 2024-10-23 PROCEDURE — 99213 OFFICE O/P EST LOW 20 MIN: CPT | Performed by: NURSE PRACTITIONER

## 2024-10-23 NOTE — PROGRESS NOTES
"Chief Complaint  Feft Foot Pain    Subjective          Gina Loomis presents to [facility]  History of Present Illness  History of Present Illness  The patient is a 44-year-old female who presents for evaluation of foot pain.    She reports that her left foot pain began with the development of a callus on her left foot. Despite two visits to a podiatrist who trimmed the callus, she continues to experience discomfort. The area is sensitive to touch and feels soft. Prolonged standing results in a burning sensation. Her last visit to the podiatrist was a week ago, during which the callus was shaved and a cream was applied. However, this treatment did not provide significant relief. She has been applying Kerasal cream to manage dry skin. No redness no warmth.  No open skin.  Would like referral to new podiatrist to see if there are any other opinions.    She also reports an incident of aspiration on a biscuit last Wednesday, which she felt lodged in her chest. She was already on amoxicillin at the time. She experienced difficulty breathing on one side for about 5 minutes. She is currently taking Singulair and uses a nasal spray occasionally when her nose becomes stuffy. She completed her course of amoxicillin yesterday morning. Has occasional cough but states likely attributed to allergies. No fever. No Chills. No chest pain, no chest pressure, no shortness of breath, no trouble breathing.     Objective   Vital Signs:   /78   Pulse 76   Ht 162.6 cm (64\")   Wt 134 kg (296 lb)   SpO2 98%   BMI 50.81 kg/m²     Body mass index is 50.81 kg/m².    Review of Systems   Constitutional:  Negative for chills, fatigue and fever.   HENT:  Negative for congestion.    Eyes:  Negative for visual disturbance.   Respiratory:  Positive for cough. Negative for shortness of breath and wheezing.    Cardiovascular:  Negative for chest pain and leg swelling.   Genitourinary:  Negative for " decreased urine volume, dysuria, frequency, hematuria and urgency.   Musculoskeletal:  Negative for joint swelling.        Foot pain   Skin:  Negative for color change, rash, skin lesions and wound.   Neurological:  Negative for weakness, numbness and headache.       Past History:  Medical History: has a past medical history of Allergic, Anemia, Anxiety state (2010), Anxiety syndrome, Asthma, Bell's palsy, Carpal tunnel syndrome, Chronic bronchitis, CTS (carpal tunnel syndrome), Depression, Diabetes mellitus, Dizziness, Fracture, GERD (gastroesophageal reflux disease) (2010), Headache, tension-type, HL (hearing loss), Hyperlipidemia (2013), Hypertension, Hypothyroidism (2013), Iron deficiency anemia secondary to inadequate dietary iron intake (2023), Kidney infection, Memory loss, Memory loss, Obesity, Preeclampsia (), Shingles, Sleep apnea (12), Thyroid disease, Trigeminal neuralgia, Upper respiratory infection (2018), and Weakness.   Surgical History: has a past surgical history that includes Carpal tunnel release (Left, 2015); Carpal tunnel release (Right, 08/15/2008);  section; Laparoscopic tubal ligation (); hysteroscopy endometrial ablation tubal sterilization (); Hernia repair (); Esophagogastroduodenoscopy (N/A, 2024); Colonoscopy (N/A, 2024); Endometrial ablation; Tubal ligation; and Bladder surgery (2024).   Family History: family history includes Asthma in her mother; COPD in her mother; Coronary artery disease in her father and mother; Diabetes in her father, maternal grandmother, paternal grandmother, and sister; Heart disease in her father and mother; High cholesterol in her father and mother; Hyperlipidemia in her father; Hypertension in her father and mother; Kidney failure in her father; Lymphoma in her maternal grandmother; Obesity in her maternal grandmother.   Social History: reports that she has been  smoking cigarettes. She started smoking about 31 years ago. She has a 31.8 pack-year smoking history. She has been exposed to tobacco smoke. She has never used smokeless tobacco. She reports that she does not currently use alcohol. She reports that she does not use drugs.    PHQ-2 Depression Screening  Little interest or pleasure in doing things?     Feeling down, depressed, or hopeless?     PHQ-2 Total Score         PHQ-9 Depression Screening  Little interest or pleasure in doing things?     Feeling down, depressed, or hopeless?     PHQ-2 Total Score     Trouble falling or staying asleep, or sleeping too much?     Feeling tired or having little energy?     Poor appetite or overeating?     Feeling bad about yourself - or that you are a failure or have let yourself or your family down?     Trouble concentrating on things, such as reading the newspaper or watching television?     Moving or speaking so slowly that other people could have noticed? Or the opposite - being so fidgety or restless that you have been moving around a lot more than usual?     Thoughts that you would be better off dead, or of hurting yourself in some way?     PHQ-9 Total Score     If you checked off any problems, how difficult have these problems made it for you to do your work, take care of things at home, or get along with other people?          PHQ-9 Total Score:        Patient screened positive for depression based on a PHQ-9 score of  on . Follow-up recommendations include:          Current Outpatient Medications:     albuterol sulfate  (90 Base) MCG/ACT inhaler, Inhale 2 puffs Every 4 (Four) Hours As Needed for Wheezing., Disp: 18 g, Rfl: 5    carvedilol (COREG) 12.5 MG tablet, Take 1 tablet by mouth 2 (Two) Times a Day., Disp: 180 tablet, Rfl: 3    diclofenac (VOLTAREN) 50 MG EC tablet, Take 1 tablet by mouth 2 (Two) Times a Day., Disp: 60 tablet, Rfl: 5    ferrous gluconate (FERGON) 324 MG tablet, Take 1 tablet by mouth once  daily with breakfast, Disp: 30 tablet, Rfl: 2    FLUoxetine (PROzac) 20 MG capsule, Take 3 capsules by mouth Daily., Disp: 90 capsule, Rfl: 5    hydrOXYzine (ATARAX) 50 MG tablet, Take 1 tablet by mouth 2 (Two) Times a Day As Needed for Anxiety., Disp: , Rfl:     levothyroxine (SYNTHROID, LEVOTHROID) 50 MCG tablet, Take 1 tablet by mouth Daily., Disp: 90 tablet, Rfl: 1    losartan (COZAAR) 25 MG tablet, Take 1 tablet by mouth Daily., Disp: , Rfl:     meclizine (ANTIVERT) 25 MG tablet, Take 0.5 tablets by mouth 3 (Three) Times a Day As Needed for Dizziness., Disp: , Rfl:     metFORMIN (GLUCOPHAGE) 1000 MG tablet, Take 1 tablet by mouth 2 (Two) Times a Day With Meals., Disp: 180 tablet, Rfl: 1    methenamine (HIPREX) 1 g tablet, TAKE 1 TABLET BY MOUTH TWICE DAILY WITH MEALS, Disp: 60 tablet, Rfl: 3    montelukast (SINGULAIR) 10 MG tablet, Take 1 tablet by mouth Every Night., Disp: 90 tablet, Rfl: 1    NIFEdipine CC (ADALAT CC) 90 MG 24 hr tablet, Take 1 tablet by mouth Daily., Disp: 90 tablet, Rfl: 1    nitroglycerin (NITROSTAT) 0.4 MG SL tablet, 1 under the tongue as needed for angina, may repeat every 5 mins for up three doses.  If no resolution of chest pain, call 911 or head to the nearest emergency room., Disp: 100 tablet, Rfl: 11    norethindrone (AYGESTIN) 5 MG tablet, Take 1 tablet by mouth Daily., Disp: 30 tablet, Rfl: 0    omega-3 acid ethyl esters (LOVAZA) 1 g capsule, Take 2 capsules by mouth 2 (Two) Times a Day., Disp: 360 capsule, Rfl: 3    omeprazole (priLOSEC) 20 MG capsule, Take 1 capsule by mouth 2 (Two) Times a Day., Disp: 60 capsule, Rfl: 5    ondansetron ODT (ZOFRAN-ODT) 4 MG disintegrating tablet, 1 tablet Every 8 (Eight) Hours As Needed for Nausea or Vomiting., Disp: , Rfl:     rosuvastatin (CRESTOR) 10 MG tablet, Take 1 tablet by mouth Every Night., Disp: 90 tablet, Rfl: 3    solifenacin (VESICARE) 10 MG tablet, Take 1 tablet by mouth Daily., Disp: 30 tablet, Rfl: 0     triamterene-hydrochlorothiazide (MAXZIDE-25) 37.5-25 MG per tablet, Take 1 tablet by mouth Daily., Disp: 30 tablet, Rfl: 5    Symbicort 160-4.5 MCG/ACT inhaler, Inhale 2 puffs 2 (Two) Times a Day. (Patient not taking: Reported on 10/23/2024), Disp: , Rfl:    (Not in a hospital admission)     Allergies: Lisinopril, Claritin-d 24 hour [loratadine-pseudoephedrine er], Loratadine, Morphine, and Pseudoephedrine    Physical Exam  Constitutional:       Appearance: Normal appearance.   HENT:      Right Ear: Tympanic membrane, ear canal and external ear normal.      Left Ear: Tympanic membrane, ear canal and external ear normal.      Nose: Nose normal.      Mouth/Throat:      Mouth: Mucous membranes are moist.      Pharynx: Oropharynx is clear.   Cardiovascular:      Rate and Rhythm: Normal rate and regular rhythm.      Heart sounds: Normal heart sounds.   Pulmonary:      Effort: Pulmonary effort is normal. No respiratory distress.      Breath sounds: Normal breath sounds. No wheezing, rhonchi or rales.   Skin:     General: Skin is warm.      Findings: No erythema or rash.      Comments: Soft palpable type area present to underside of left foot more medial. Skin is rough in texture. No redness, no warmth, no open skin.    Neurological:      General: No focal deficit present.      Mental Status: She is alert and oriented to person, place, and time. Mental status is at baseline.   Psychiatric:         Mood and Affect: Mood normal.         Behavior: Behavior normal.         Thought Content: Thought content normal.         Judgment: Judgment normal.        Physical Exam  Ears appear normal with minimal wax and fluid. Throat exhibits slight drainage.    Result Review :          Results               Assessment and Plan    Diagnoses and all orders for this visit:    1. Left foot pain (Primary)  -     Ambulatory Referral to Podiatry    2. Acute cough  -     XR Chest PA & Lateral (In Office)      Assessment & Plan  1. Left foot  callus.  The presence of a large callus on her left foot is causing soreness, squishiness, and burning sensations. She has been seeing a podiatrist at the [facility], but the treatment has not been effective. A referral to a podiatrist in [place] will be arranged for further management. She is advised to continue using Kerasal cream and avoid using corn removers due to her diabetic status.  Education provided.  Return to clinic or ED with any issues or concerns.    2. Aspiration.  She experienced an aspiration episode last Wednesday after aspirating on a biscuit. She completed her course of amoxicillin yesterday morning. Despite the episode, she is ventilating adequately. A chest x-ray will be ordered to rule out any residual foreign body. She is advised to start using Flonase daily (along with her singulair) to prevent sinus or ear infections as she does have some post nasal drainage present.  Again, she states the cough is minimal and nonproductive.  Hopefully adding the Flonase will help.  Informed her if cough worsens or does not resolve within the next few days to return to clinic.  Education provided.  Return to clinic or ED with any issues or concerns.                        Follow Up   Return if symptoms worsen or fail to improve.  Patient was given instructions and counseling regarding her condition or for health maintenance advice. Please see specific information pulled into the AVS if appropriate.     Patient or patient representative verbalized consent for the use of Ambient Listening during the visit with  BENY Starr for chart documentation. 10/23/2024  14:33 EDT    BENY Starr

## 2024-10-27 DIAGNOSIS — N32.81 OAB (OVERACTIVE BLADDER): ICD-10-CM

## 2024-10-27 DIAGNOSIS — R68.84 JAW PAIN: ICD-10-CM

## 2024-10-28 RX ORDER — SOLIFENACIN SUCCINATE 10 MG/1
10 TABLET, FILM COATED ORAL DAILY
Qty: 30 TABLET | Refills: 0 | Status: SHIPPED | OUTPATIENT
Start: 2024-10-28

## 2024-10-28 NOTE — TELEPHONE ENCOUNTER
Rx Refill Note  Requested Prescriptions     Pending Prescriptions Disp Refills    solifenacin (VESICARE) 10 MG tablet [Pharmacy Med Name: Solifenacin Succinate 10 MG Oral Tablet] 30 tablet 0     Sig: Take 1 tablet by mouth once daily      Last office visit with prescribing clinician: 5/1/2024   Last telemedicine visit with prescribing clinician: Visit date not found   Next office visit with prescribing clinician: 1/14/2025    Lisa Olsen MA  10/28/24, 08:03 EDT

## 2024-11-01 NOTE — TELEPHONE ENCOUNTER
DR THAT PRESCRIBED THIS RETIRED NEEDS US TO CALL IN, WAS SEEN BY MARRY LAST WEEK      Caller: Gina Loomis    Relationship: Self    Best call back number:[phone]     Requested Prescriptions:   Requested Prescriptions     Pending Prescriptions Disp Refills    losartan (COZAAR) 25 MG tablet       Sig: Take 1 tablet by mouth Daily.        Pharmacy where request should be sent: [facility] [address] [place] [address] [phone] [facility] [phone]      Last office visit with prescribing clinician: 4/23/2024   Last telemedicine visit with prescribing clinician: Visit date not found   Next office visit with prescribing clinician: Visit date not found     Additional details provided by patient: DR THAT PRESCRIBED THIS RETIRED NEEDS US TO CALL IN, WAS SEEN BY MARRY LAST WEEK   CALL IF ANY ISSUE FILLING     Does the patient have less than a 3 day supply:  [x] Yes  [] No    Would you like a call back once the refill request has been completed: [] Yes [x] No    If the office needs to give you a call back, can they leave a voicemail: [] Yes [x] No    Galina Rivera Rep   11/01/24 10:48 EDT

## 2024-11-03 RX ORDER — LOSARTAN POTASSIUM 25 MG/1
25 TABLET ORAL DAILY
Qty: 90 TABLET | Refills: 0 | Status: SHIPPED | OUTPATIENT
Start: 2024-11-03

## 2024-11-12 RX ORDER — NIFEDIPINE 90 MG/1
90 TABLET, FILM COATED, EXTENDED RELEASE ORAL DAILY
Qty: 90 TABLET | Refills: 1 | Status: SHIPPED | OUTPATIENT
Start: 2024-11-12

## 2024-11-14 ENCOUNTER — OFFICE VISIT (OUTPATIENT)
Dept: FAMILY MEDICINE CLINIC | Facility: CLINIC | Age: 44
End: 2024-11-14
Payer: COMMERCIAL

## 2024-11-14 VITALS
HEART RATE: 76 BPM | OXYGEN SATURATION: 98 % | DIASTOLIC BLOOD PRESSURE: 80 MMHG | SYSTOLIC BLOOD PRESSURE: 120 MMHG | WEIGHT: 293 LBS | HEIGHT: 64 IN | BODY MASS INDEX: 50.02 KG/M2

## 2024-11-14 DIAGNOSIS — D50.9 IRON DEFICIENCY ANEMIA, UNSPECIFIED IRON DEFICIENCY ANEMIA TYPE: ICD-10-CM

## 2024-11-14 DIAGNOSIS — E11.9 TYPE 2 DIABETES MELLITUS WITHOUT COMPLICATION, WITHOUT LONG-TERM CURRENT USE OF INSULIN: ICD-10-CM

## 2024-11-14 DIAGNOSIS — F41.9 ANXIETY: ICD-10-CM

## 2024-11-14 DIAGNOSIS — J40 BRONCHITIS: Primary | ICD-10-CM

## 2024-11-14 LAB
EXPIRATION DATE: ABNORMAL
HBA1C MFR BLD: 6.2 % (ref 4.5–5.7)
Lab: ABNORMAL

## 2024-11-14 PROCEDURE — 83036 HEMOGLOBIN GLYCOSYLATED A1C: CPT | Performed by: FAMILY MEDICINE

## 2024-11-14 PROCEDURE — 99214 OFFICE O/P EST MOD 30 MIN: CPT | Performed by: FAMILY MEDICINE

## 2024-11-14 RX ORDER — FERROUS GLUCONATE 324(38)MG
1 TABLET ORAL
Qty: 30 TABLET | Refills: 0 | Status: SHIPPED | OUTPATIENT
Start: 2024-11-14

## 2024-11-14 RX ORDER — CLONAZEPAM 1 MG/1
1 TABLET ORAL 2 TIMES DAILY PRN
Qty: 10 TABLET | Refills: 0 | Status: SHIPPED | OUTPATIENT
Start: 2024-11-14

## 2024-11-14 NOTE — PROGRESS NOTES
Follow Up Office Visit      Date of Visit:  2024   Patient Name: Gina Loomis  : 1980   MRN: [id number]     Chief Complaint:    Chief Complaint   Patient presents with    Cough    Hypertension       History of Present Illness: Gina Loomis is a 44 y.o. female who is here today for follow up.    History of Present Illness  The patient is a 44-year-old female presenting with symptoms of an upper respiratory tract infection and concerns about blood sugar levels.    She reports feeling unwell, with three family members having tested positive for COVID-19 last week. Despite testing negative herself, she has been experiencing ear pain, nasal congestion, and a cough since yesterday. She has a history of bronchitis and was prescribed levofloxacin by her pulmonologist yesterday. She also has a prescription for cough medicine. She has a history of recurrent urinary tract infections (UTIs) and bronchitis, but her CT scan and lung function test were normal.    She has been experiencing anxiety or panic attacks, characterized by chest pressure, frequent belching, and a sensation of her heart stopping or missing a beat. She is currently on omeprazole for acid reflux, which was diagnosed when she reported removing her sleep mask during the night. She is also taking fluoxetine 60 mg for anxiety and hydroxyzine as needed for breakthrough symptoms.    She has noticed a ringing in her ears when she turns her head, accompanied by sharp pain. She has been taking two packs of Pop-Tarts every morning and is on metformin for her blood sugar levels.    She has been experiencing a sensation of a screw tightening in her hip when she walks, which eases when she stops walking. She has been taking diclofenac for over a year but has recently started to wean off it. She has also been taking Tylenol. A recent chest x-ray showed thoracic arthritis.    She has been receiving iron infusions once a week for five weeks  and is also on oral iron supplements.      Subjective      Review of Systems:   Review of Systems   Constitutional:  Negative for unexpected weight loss.   Eyes:  Negative for blurred vision.   Respiratory:  Positive for cough and wheezing.    Endocrine: Positive for polydipsia and polyuria.   Neurological:  Positive for tremors and speech difficulty. Negative for confusion.       Past Medical History:   Past Medical History:   Diagnosis Date    Allergic     Anemia     Anxiety state 2010    Anxiety syndrome     Asthma     Bell's palsy     twice    Carpal tunnel syndrome     Chronic bronchitis     Clotting disorder     CTS (carpal tunnel syndrome)     Depression     Diabetes mellitus     Dizziness     Fracture     GERD (gastroesophageal reflux disease) 2010    Headache, tension-type     HL (hearing loss)     Hyperlipidemia 2013    Hypertension     Hypothyroidism 2013    Iron deficiency anemia secondary to inadequate dietary iron intake 2023    Kidney infection     Memory loss     Memory loss     Obesity     Preeclampsia 2004        Shingles     Sleep apnea 12    Thyroid disease     Trigeminal neuralgia     Upper respiratory infection 2018    Weakness        Past Surgical History:   Past Surgical History:   Procedure Laterality Date    BLADDER SURGERY  2024    Scope    CARPAL TUNNEL RELEASE Left 2015    CARPAL TUNNEL RELEASE Right 08/15/2008     SECTION      X 2,  and     COLONOSCOPY N/A 2024    Procedure: COLONOSCOPY;  Surgeon: Ronald Reyes MD;  Location:  [facility];  Service: Gastroenterology;  Laterality: N/A;    COLONOSCOPY  2024    ENDOMETRIAL ABLATION      ENDOSCOPY N/A 2024    Procedure: ESOPHAGOGASTRODUODENOSCOPY;  Surgeon: Ronald Reyes MD;  Location:  [facility];  Service: Gastroenterology;  Laterality: N/A;    HERNIA REPAIR      incisional on abdomen    HYSTEROSCOPY ENDOMETRIAL  ABLATION TUBAL STERILIZATION  2020    LAPAROSCOPIC TUBAL LIGATION  2020    TUBAL ABDOMINAL LIGATION         Family History:   Family History   Problem Relation Age of Onset    Hypertension Mother     Coronary artery disease Mother     Heart disease Mother     High cholesterol Mother     Asthma Mother     COPD Mother     High cholesterol Father     Heart disease Father     Diabetes Father     Coronary artery disease Father     Kidney failure Father     Hypertension Father     Hyperlipidemia Father     Diabetes Sister     Lymphoma Maternal Grandmother     Diabetes Maternal Grandmother     Obesity Maternal Grandmother     Diabetes Paternal Grandmother        Social History:   Social History     Socioeconomic History    Marital status:    Tobacco Use    Smoking status: Every Day     Current packs/day: 1.00     Average packs/day: 1 pack/day for 31.9 years (31.9 ttl pk-yrs)     Types: Cigarettes     Start date: 1/1/1993     Passive exposure: Current    Smokeless tobacco: Never   Vaping Use    Vaping status: Never Used   Substance and Sexual Activity    Alcohol use: Not Currently    Drug use: Never    Sexual activity: Yes     Partners: Male     Birth control/protection: Tubal ligation       Medications:     Current Outpatient Medications:     albuterol sulfate  (90 Base) MCG/ACT inhaler, Inhale 2 puffs Every 4 (Four) Hours As Needed for Wheezing., Disp: 18 g, Rfl: 5    carvedilol (COREG) 12.5 MG tablet, Take 1 tablet by mouth 2 (Two) Times a Day., Disp: 180 tablet, Rfl: 3    diclofenac (VOLTAREN) 50 MG EC tablet, Take 1 tablet by mouth twice daily, Disp: 60 tablet, Rfl: 0    ferrous gluconate (FERGON) 324 MG tablet, Take 1 tablet by mouth once daily with breakfast, Disp: 30 tablet, Rfl: 2    FLUoxetine (PROzac) 20 MG capsule, Take 3 capsules by mouth Daily., Disp: 90 capsule, Rfl: 5    hydrOXYzine (ATARAX) 50 MG tablet, Take 1 tablet by mouth 2 (Two) Times a Day As Needed for Anxiety., Disp: , Rfl:      levothyroxine (SYNTHROID, LEVOTHROID) 50 MCG tablet, Take 1 tablet by mouth Daily., Disp: 90 tablet, Rfl: 1    losartan (COZAAR) 25 MG tablet, Take 1 tablet by mouth Daily., Disp: 90 tablet, Rfl: 0    meclizine (ANTIVERT) 25 MG tablet, Take 0.5 tablets by mouth 3 (Three) Times a Day As Needed for Dizziness., Disp: , Rfl:     metFORMIN (GLUCOPHAGE) 1000 MG tablet, Take 1 tablet by mouth 2 (Two) Times a Day With Meals., Disp: 180 tablet, Rfl: 1    methenamine (HIPREX) 1 g tablet, TAKE 1 TABLET BY MOUTH TWICE DAILY WITH MEALS, Disp: 60 tablet, Rfl: 3    montelukast (SINGULAIR) 10 MG tablet, Take 1 tablet by mouth Every Night., Disp: 90 tablet, Rfl: 1    NIFEdipine CC (ADALAT CC) 90 MG 24 hr tablet, Take 1 tablet by mouth Daily., Disp: 90 tablet, Rfl: 1    nitroglycerin (NITROSTAT) 0.4 MG SL tablet, 1 under the tongue as needed for angina, may repeat every 5 mins for up three doses.  If no resolution of chest pain, call 911 or head to the nearest emergency room., Disp: 100 tablet, Rfl: 11    norethindrone (AYGESTIN) 5 MG tablet, Take 1 tablet by mouth Daily., Disp: 30 tablet, Rfl: 0    omega-3 acid ethyl esters (LOVAZA) 1 g capsule, Take 2 capsules by mouth 2 (Two) Times a Day., Disp: 360 capsule, Rfl: 3    omeprazole (priLOSEC) 20 MG capsule, Take 1 capsule by mouth 2 (Two) Times a Day., Disp: 60 capsule, Rfl: 5    ondansetron ODT (ZOFRAN-ODT) 4 MG disintegrating tablet, 1 tablet Every 8 (Eight) Hours As Needed for Nausea or Vomiting., Disp: , Rfl:     rosuvastatin (CRESTOR) 10 MG tablet, Take 1 tablet by mouth Every Night., Disp: 90 tablet, Rfl: 3    solifenacin (VESICARE) 10 MG tablet, Take 1 tablet by mouth once daily, Disp: 30 tablet, Rfl: 0    Symbicort 160-4.5 MCG/ACT inhaler, Inhale 2 puffs 2 (Two) Times a Day. (Patient not taking: Reported on 10/23/2024), Disp: , Rfl:     triamterene-hydrochlorothiazide (MAXZIDE-25) 37.5-25 MG per tablet, Take 1 tablet by mouth Daily., Disp: 30 tablet, Rfl: 5    Allergies:  "  Allergies   Allergen Reactions    Lisinopril Cough    Claritin-D 24 Hour [Loratadine-Pseudoephedrine Er] Other (See Comments)     Couldn't sleep    Loratadine Anxiety    Morphine Itching    Pseudoephedrine Anxiety       Objective     Physical Exam:  Vital Signs:   Vitals:    11/14/24 1005   BP: 120/80   Pulse: 76   SpO2: 98%   Weight: 133 kg (294 lb)   Height: 162.6 cm (64\")     Body mass index is 50.46 kg/m².     Physical Exam  Vitals and nursing note reviewed.   Constitutional:       General: She is not in acute distress.     Appearance: Normal appearance. She is not ill-appearing.   HENT:      Head: Normocephalic and atraumatic.      Right Ear: Tympanic membrane and ear canal normal.      Left Ear: Tympanic membrane and ear canal normal.      Nose: Nose normal.   Cardiovascular:      Rate and Rhythm: Normal rate and regular rhythm.      Heart sounds: Normal heart sounds.   Pulmonary:      Effort: Pulmonary effort is normal.      Breath sounds: Wheezing present.   Neurological:      Mental Status: She is alert and oriented to person, place, and time. Mental status is at baseline.   Psychiatric:         Mood and Affect: Mood normal.       Physical Exam  Ears appear normal with no redness in the canal. Eardrum appears normal. No signs of infection behind the eardrum.    Procedures    Results  Laboratory Studies  Hemoglobin A1c is 6.2.  Assessment / Plan      Assessment/Plan:   Diagnoses and all orders for this visit:    1. Uncontrolled type 2 diabetes mellitus with hyperglycemia (Primary)  -     POC Glycosylated Hemoglobin (Hb A1C)    2. Bronchitis  -     IgG Subclassses (1-4)  -     IgG       Assessment & Plan  1. Upper respiratory tract infection.  Symptoms include ear pain, nasal congestion, and cough. She tested negative for COVID-19 yesterday but was exposed to COVID-19 within the past week. She was prescribed Levaquin for bronchitis, which should also cover any potential bacterial infection. No signs of ear " infection were observed. She was advised to monitor symptoms and retest for COVID-19 if symptoms persist.    2. Diabetes Mellitus.  Her hemoglobin A1c is 6.2, indicating well-controlled blood sugar levels. She is currently taking metformin. She was advised to continue her current medication regimen and maintain a balanced diet.    3. Anxiety.  She experiences chest pressure, belching, and a sensation of missed heartbeats, which could be related to anxiety or acid reflux. She is currently taking fluoxetine 60 mg and hydroxyzine as needed. Klonopin was prescribed for her upcoming plane trip to manage anxiety.    4. Acid Reflux.  She is on omeprazole for acid reflux and reports no heartburn. She was advised to continue her current medication.    5. Recurrent UTIs.  She has a history of recurrent UTIs and was recently treated with antibiotics. Her kidney function is normal. No additional treatment was recommended at this time.    6. SI Joint Inflammation.  She experiences hip discomfort, likely due to inflammation in the SI joint. She was advised to apply ice, perform gentle stretches, and take Tylenol Arthritis twice daily.          Follow Up:   No follow-ups on file.    Daniel Stratton  [facility] [place]     Patient or patient representative verbalized consent for the use of Ambient Listening during the visit with  Daniel Stratton MD for chart documentation. 11/14/2024  13:06 EST

## 2024-11-15 LAB
IGG SERPL-MCNC: 869 MG/DL (ref 586–1602)
IGG1 SER-MCNC: 505 MG/DL (ref 248–810)
IGG2 SER-MCNC: 241 MG/DL (ref 130–555)
IGG3 SER-MCNC: 38 MG/DL (ref 15–102)
IGG4 SER-MCNC: 11 MG/DL (ref 2–96)

## 2024-11-22 ENCOUNTER — OFFICE VISIT (OUTPATIENT)
Dept: FAMILY MEDICINE CLINIC | Facility: CLINIC | Age: 44
End: 2024-11-22
Payer: COMMERCIAL

## 2024-11-22 VITALS
HEART RATE: 87 BPM | OXYGEN SATURATION: 97 % | BODY MASS INDEX: 49.68 KG/M2 | WEIGHT: 291 LBS | SYSTOLIC BLOOD PRESSURE: 130 MMHG | HEIGHT: 64 IN | DIASTOLIC BLOOD PRESSURE: 80 MMHG

## 2024-11-22 DIAGNOSIS — M54.50 ACUTE BILATERAL LOW BACK PAIN WITHOUT SCIATICA: Primary | ICD-10-CM

## 2024-11-22 DIAGNOSIS — M25.572 ACUTE LEFT ANKLE PAIN: ICD-10-CM

## 2024-11-22 DIAGNOSIS — M25.561 ACUTE PAIN OF RIGHT KNEE: ICD-10-CM

## 2024-11-22 DIAGNOSIS — M79.641 RIGHT HAND PAIN: ICD-10-CM

## 2024-11-22 PROCEDURE — 99214 OFFICE O/P EST MOD 30 MIN: CPT | Performed by: PHYSICIAN ASSISTANT

## 2024-11-22 RX ORDER — METHOCARBAMOL 500 MG/1
TABLET, FILM COATED ORAL
Qty: 10 TABLET | Refills: 0 | Status: SHIPPED | OUTPATIENT
Start: 2024-11-22

## 2024-11-22 NOTE — PROGRESS NOTES
Chief Complaint  Fall (Had a fall 11/20 hurt back, right knee, right hand and left ankle )    Subjective          Gina Loomis presents to [facility]  History of Present Illness  Patient in today for evaluation on strain to lower back along with pain to left ankle, right knee and right hand. She fell 2 days ago while stepping on stepping stone and left ankle twisted and she fell down. She states feels the ankle is just twisted and knee and hand are improving and does not feel needs xray at this time. . Lower back has continued to be sore- worse with movement. Denies numbness or pain down into legs. She has been taking ibuprofen which has helped some-- felt it worked better then the diclofenac. She is requesting a few muscle relaxers- states has used in the past. 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   Fall  The accident occurred 2 days ago. The fall occurred while walking. The pain is present in the back, left foot, right knee and right hand. Pertinent negatives include no abdominal pain, bowel incontinence, fever, numbness or tingling.   Back Pain  This is a recurrent problem. The current episode started in the past 7 days. The problem occurs daily. The problem has been worsening since onset. The pain is present in the gluteal and sacro-iliac. The quality of the pain is described as aching and stabbing. The pain radiates to the left buttock, right buttock, left thigh and right thigh. The pain is at a severity of 6/10. The pain is The same all the time. The symptoms are aggravated by bending and sitting. Stiffness is present At night and all day. Pertinent negatives include no abdominal pain, bladder incontinence, bowel incontinence, chest pain, dysuria, fever, numbness, paresthesias, perianal numbness, tingling, weakness or weight loss. Risk factors include lack of exercise, obesity, poor posture and  "recent trauma.   Additional comments: Gets worse with use      Objective   Vital Signs:   /80   Pulse 87   Ht 162.6 cm (64\")   Wt 132 kg (291 lb)   SpO2 97%   BMI 49.95 kg/m²     Body mass index is 49.95 kg/m².    Review of Systems   Constitutional:  Negative for fever and unexpected weight loss.   Respiratory:  Negative for shortness of breath.    Cardiovascular:  Negative for chest pain.   Gastrointestinal:  Negative for abdominal pain and bowel incontinence.   Genitourinary:  Negative for dysuria and urinary incontinence.   Musculoskeletal:  Positive for arthralgias and back pain.   Neurological:  Negative for tingling, weakness, numbness and paresthesias.       Past History:  Medical History: has a past medical history of Allergic, Anemia, Anxiety state (2010), Anxiety syndrome, Asthma, Bell's palsy, Carpal tunnel syndrome, Chronic bronchitis, Clotting disorder, CTS (carpal tunnel syndrome), Depression, Diabetes mellitus, Dizziness, Fracture, GERD (gastroesophageal reflux disease) (2010), Headache, tension-type, HL (hearing loss), Hyperlipidemia (2013), Hypertension, Hypothyroidism (2013), Iron deficiency anemia secondary to inadequate dietary iron intake (2023), Kidney infection, Memory loss, Memory loss, Obesity, Preeclampsia (), Shingles, Sleep apnea (12), Thyroid disease, Trigeminal neuralgia, Upper respiratory infection (2018), and Weakness.   Surgical History: has a past surgical history that includes Carpal tunnel release (Left, 2015); Carpal tunnel release (Right, 08/15/2008);  section; Laparoscopic tubal ligation (); hysteroscopy endometrial ablation tubal sterilization (); Hernia repair (); Esophagogastroduodenoscopy (N/A, 2024); Colonoscopy (N/A, 2024); Endometrial ablation; Tubal ligation; Bladder surgery (2024); and Colonoscopy (2024).   Family History: family history includes Asthma in her " mother; COPD in her mother; Coronary artery disease in her father and mother; Diabetes in her father, maternal grandmother, paternal grandmother, and sister; Heart disease in her father and mother; High cholesterol in her father and mother; Hyperlipidemia in her father; Hypertension in her father and mother; Kidney failure in her father; Lymphoma in her maternal grandmother; Obesity in her maternal grandmother.   Social History: reports that she has been smoking cigarettes. She started smoking about 31 years ago. She has a 31.9 pack-year smoking history. She has been exposed to tobacco smoke. She has never used smokeless tobacco. She reports that she does not currently use alcohol. She reports that she does not use drugs.      Current Outpatient Medications:     albuterol sulfate  (90 Base) MCG/ACT inhaler, Inhale 2 puffs Every 4 (Four) Hours As Needed for Wheezing., Disp: 18 g, Rfl: 5    carvedilol (COREG) 12.5 MG tablet, Take 1 tablet by mouth 2 (Two) Times a Day., Disp: 180 tablet, Rfl: 3    clonazePAM (KlonoPIN) 1 MG tablet, Take 1 tablet by mouth 2 (Two) Times a Day As Needed for Anxiety (with flying)., Disp: 10 tablet, Rfl: 0    diclofenac (VOLTAREN) 50 MG EC tablet, Take 1 tablet by mouth twice daily, Disp: 60 tablet, Rfl: 0    ferrous gluconate (FERGON) 324 MG tablet, Take 1 tablet by mouth once daily with breakfast, Disp: 30 tablet, Rfl: 0    FLUoxetine (PROzac) 20 MG capsule, Take 3 capsules by mouth Daily., Disp: 90 capsule, Rfl: 5    levothyroxine (SYNTHROID, LEVOTHROID) 50 MCG tablet, Take 1 tablet by mouth Daily., Disp: 90 tablet, Rfl: 1    losartan (COZAAR) 25 MG tablet, Take 1 tablet by mouth Daily., Disp: 90 tablet, Rfl: 0    meclizine (ANTIVERT) 25 MG tablet, Take 0.5 tablets by mouth 3 (Three) Times a Day As Needed for Dizziness., Disp: , Rfl:     metFORMIN (GLUCOPHAGE) 1000 MG tablet, Take 1 tablet by mouth 2 (Two) Times a Day With Meals., Disp: 180 tablet, Rfl: 1    methenamine (HIPREX) 1  g tablet, TAKE 1 TABLET BY MOUTH TWICE DAILY WITH MEALS, Disp: 60 tablet, Rfl: 3    montelukast (SINGULAIR) 10 MG tablet, Take 1 tablet by mouth Every Night., Disp: 90 tablet, Rfl: 1    NIFEdipine CC (ADALAT CC) 90 MG 24 hr tablet, Take 1 tablet by mouth Daily., Disp: 90 tablet, Rfl: 1    nitroglycerin (NITROSTAT) 0.4 MG SL tablet, 1 under the tongue as needed for angina, may repeat every 5 mins for up three doses.  If no resolution of chest pain, call 911 or head to the nearest emergency room., Disp: 100 tablet, Rfl: 11    norethindrone (AYGESTIN) 5 MG tablet, Take 1 tablet by mouth Daily., Disp: 30 tablet, Rfl: 0    omega-3 acid ethyl esters (LOVAZA) 1 g capsule, Take 2 capsules by mouth 2 (Two) Times a Day., Disp: 360 capsule, Rfl: 3    omeprazole (priLOSEC) 20 MG capsule, Take 1 capsule by mouth 2 (Two) Times a Day., Disp: 60 capsule, Rfl: 5    ondansetron ODT (ZOFRAN-ODT) 4 MG disintegrating tablet, 1 tablet Every 8 (Eight) Hours As Needed for Nausea or Vomiting., Disp: , Rfl:     rosuvastatin (CRESTOR) 10 MG tablet, Take 1 tablet by mouth Every Night., Disp: 90 tablet, Rfl: 3    solifenacin (VESICARE) 10 MG tablet, Take 1 tablet by mouth once daily, Disp: 30 tablet, Rfl: 0    triamterene-hydrochlorothiazide (MAXZIDE-25) 37.5-25 MG per tablet, Take 1 tablet by mouth Daily., Disp: 30 tablet, Rfl: 5    methocarbamol (ROBAXIN) 500 MG tablet, Take one tablet by oral route twice a day, as needed, Disp: 10 tablet, Rfl: 0  Allergies: Lisinopril, Claritin-d 24 hour [loratadine-pseudoephedrine er], Loratadine, Morphine, and Pseudoephedrine    Physical Exam  Constitutional:       Appearance: Normal appearance.   Musculoskeletal:      Lumbar back: Tenderness present.      Comments: Tender to palpation of musculature bilateral lower lumbar spine ; walks with normal gait;  able to flex and extend back; FROM of left ankle with mild swelling noted; nontender to medial /lateral malleolar areas; small abrasion noted to right  knee with bruise noted; FROM of right knee, nontender to palpation; FROM of right hand/wrist    Neurological:      Mental Status: She is alert and oriented to person, place, and time.   Psychiatric:         Mood and Affect: Mood normal.         Behavior: Behavior normal.             Assessment and Plan   Diagnoses and all orders for this visit:    1. Acute bilateral low back pain without sciatica (Primary)  Will send in a few robaxin to use prn- discussed possible side effects of medication -she states has taken in past without any side effects;  she states is currently not taking clonazepam, meclizine, or zofran at this time-- recommend heat/ice/stretching to area; note for work given x 2 days; monitor symptoms and if not improving rtc for further evaluation   2. Acute left ankle pain  She does not feel needs any further evaluation on left ankle as feels is mild sprain and would like to monitor symptoms- states will rtc if not improving   3. Acute pain of right knee  States knee pain has improved and will monitor for any concerns   4. Right hand pain  States right hand pain is also improving and she does not feel needs any imaging at this time   Other orders  -     methocarbamol (ROBAXIN) 500 MG tablet; Take one tablet by oral route twice a day, as needed  Dispense: 10 tablet; Refill: 0            Follow Up   No follow-ups on file.  Patient was given instructions and counseling regarding her condition or for health maintenance advice. Please see specific information pulled into the AVS if appropriate.     Leslie Killian PA-C

## 2024-11-24 DIAGNOSIS — K21.9 GASTROESOPHAGEAL REFLUX DISEASE, UNSPECIFIED WHETHER ESOPHAGITIS PRESENT: ICD-10-CM

## 2024-11-24 DIAGNOSIS — N39.0 RECURRENT UTI: ICD-10-CM

## 2024-11-24 DIAGNOSIS — N32.81 OAB (OVERACTIVE BLADDER): ICD-10-CM

## 2024-11-24 DIAGNOSIS — R68.84 JAW PAIN: ICD-10-CM

## 2024-11-25 RX ORDER — METHENAMINE HIPPURATE 1000 MG/1
1 TABLET ORAL 2 TIMES DAILY WITH MEALS
Qty: 180 TABLET | Refills: 0 | Status: SHIPPED | OUTPATIENT
Start: 2024-11-25

## 2024-11-25 RX ORDER — SOLIFENACIN SUCCINATE 10 MG/1
10 TABLET, FILM COATED ORAL DAILY
Qty: 30 TABLET | Refills: 0 | Status: SHIPPED | OUTPATIENT
Start: 2024-11-25

## 2024-11-25 NOTE — TELEPHONE ENCOUNTER
Rx Refill Note  Requested Prescriptions     Pending Prescriptions Disp Refills    solifenacin (VESICARE) 10 MG tablet [Pharmacy Med Name: Solifenacin Succinate 10 MG Oral Tablet] 30 tablet 0     Sig: Take 1 tablet by mouth once daily      Last office visit with prescribing clinician: 5/1/2024   Next office visit with prescribing clinician: Visit date not found       Josiane Collier MA  11/25/24, 08:05 EST

## 2024-11-25 NOTE — TELEPHONE ENCOUNTER
Rx Refill Note  Requested Prescriptions     Pending Prescriptions Disp Refills    methenamine (HIPREX) 1 g tablet [Pharmacy Med Name: Methenamine Hippurate 1 GM Oral Tablet] 180 tablet 0     Sig: TAKE 1 TABLET BY MOUTH TWICE DAILY WITH MEALS      Last office visit with prescribing clinician: Visit date not found   Next office visit with prescribing clinician: Visit date not found       Josiane Collier MA  11/25/24, 08:04 EST

## 2024-11-27 ENCOUNTER — TELEPHONE (OUTPATIENT)
Dept: FAMILY MEDICINE CLINIC | Facility: CLINIC | Age: 44
End: 2024-11-27

## 2024-11-27 DIAGNOSIS — E03.9 HYPOTHYROIDISM (ACQUIRED): ICD-10-CM

## 2024-11-27 DIAGNOSIS — N39.0 RECURRENT UTI: ICD-10-CM

## 2024-11-27 RX ORDER — METHENAMINE HIPPURATE 1000 MG/1
1 TABLET ORAL 2 TIMES DAILY WITH MEALS
Qty: 180 TABLET | Refills: 0 | OUTPATIENT
Start: 2024-11-27

## 2024-11-27 RX ORDER — LEVOTHYROXINE SODIUM 50 UG/1
50 TABLET ORAL DAILY
Qty: 90 TABLET | Refills: 0 | Status: SHIPPED | OUTPATIENT
Start: 2024-11-27

## 2024-11-27 NOTE — TELEPHONE ENCOUNTER
Caller: Gina Loomis    Relationship: Self    Best call back number: [phone]     What medication are you requesting: IBPHROFEN 800 MG TABLET     What are your current symptoms: BACK PAIN     How long have you been experiencing symptoms: 5 DAYS     Have you had these symptoms before:    [x] Yes  [] No    Have you been treated for these symptoms before:   [x] Yes  [] No    If a prescription is needed, what is your preferred pharmacy and phone number:    [facility] [address] [place] [address] [phone] [facility] [phone]  [phone]     Additional notes:  PATIENT STATED THAT SHE IS STILL HAVING A LOT OF BACK PAIN AND WOULD LIKE FOR IBPHROFEN 800 MG TABLET BE CALLED INTO THE PHARMACY TO HELP UNTIL SHE IS ABLE TO GET INTO SEE THE CHIROPRACTOR SINCE HER PAIN IS AT A 8 AT THIS TIME

## 2024-11-27 NOTE — TELEPHONE ENCOUNTER
Caller: Vladislav Gina Rossy    Relationship: Self    Best call back number: [phone]     Requested Prescriptions:   Requested Prescriptions     Pending Prescriptions Disp Refills    methenamine (HIPREX) 1 g tablet 180 tablet 0     Sig: Take 1 tablet by mouth 2 (Two) Times a Day With Meals.        Pharmacy where request should be sent: [address] [phone] [facility] [phone]      Last office visit with prescribing clinician: 11/14/2024   Last telemedicine visit with prescribing clinician: Visit date not found   Next office visit with prescribing clinician: Visit date not found     Does the patient have less than a 3 day supply:  [x] Yes  [] No    Would you like a call back once the refill request has been completed: [] Yes [x] No    If the office needs to give you a call back, can they leave a voicemail: [] Yes [x] No    Galina Sauceda Rep   11/27/24 11:02 EST

## 2024-11-27 NOTE — TELEPHONE ENCOUNTER
PATIENT SAW JOSÉ MIGUEL AND SHE IS GOING TO A CHIROPRACTOR AND SHE IS JUST WANTING ENOUGH MEDICATION THE GET THRU THE HOLIDAYS. PLEASE GIVE HER A CALL. SHE STATE THE MEDICATION JOSÉ MIGUEL GAVE HER HELPS A LOT    ALSO NEEDS A REFILL FOR LEVOTHOR.

## 2024-12-02 DIAGNOSIS — F41.9 ANXIETY AND DEPRESSION: ICD-10-CM

## 2024-12-02 DIAGNOSIS — F32.A ANXIETY AND DEPRESSION: ICD-10-CM

## 2024-12-02 NOTE — TELEPHONE ENCOUNTER
Caller: Gina Loomis    Relationship: Self    Best call back number: [phone]    Requested Prescriptions:   Requested Prescriptions     Pending Prescriptions Disp Refills    FLUoxetine (PROzac) 20 MG capsule [Pharmacy Med Name: FLUoxetine HCl 20 MG Oral Capsule] 90 capsule 0     Sig: TAKE 3 CAPSULES BY MOUTH ONCE DAILY        Pharmacy where request should be sent: [facility] [address] [place] [address] [phone] [facility] [phone]      Last office visit with prescribing clinician: 11/14/2024   Last telemedicine visit with prescribing clinician: Visit date not found   Next office visit with prescribing clinician: Visit date not found     Additional details provided by patient: PATIENT IS COMPLETELY OUT      Does the patient have less than a 3 day supply:  [x] Yes  [] No    Would you like a call back once the refill request has been completed: [] Yes [x] No    If the office needs to give you a call back, can they leave a voicemail: [] Yes [x] No    Galina Sanders Rep   12/02/24 15:10 EST

## 2024-12-03 RX ORDER — METHOCARBAMOL 500 MG/1
TABLET, FILM COATED ORAL
Qty: 15 TABLET | Refills: 0 | Status: SHIPPED | OUTPATIENT
Start: 2024-12-03

## 2024-12-03 NOTE — TELEPHONE ENCOUNTER
I sent in a refill on the robaxin- which is what I had sent in at last visit- she'll need to f/up with PCP for further refills if needed; thanks

## 2024-12-04 NOTE — TELEPHONE ENCOUNTER
I sent in a refill on the robaxin- which is what I had sent in at last visit- she'll need to f/up with PCP for further refills if needed; thanks          Note       Pt was notifed 12/04/2024 09:20 am

## 2024-12-13 DIAGNOSIS — D50.9 IRON DEFICIENCY ANEMIA, UNSPECIFIED IRON DEFICIENCY ANEMIA TYPE: ICD-10-CM

## 2024-12-13 RX ORDER — FERROUS GLUCONATE 324(38)MG
1 TABLET ORAL
Qty: 30 TABLET | Refills: 0 | Status: SHIPPED | OUTPATIENT
Start: 2024-12-13

## 2024-12-14 DIAGNOSIS — I10 ESSENTIAL HYPERTENSION: ICD-10-CM

## 2024-12-17 RX ORDER — TRIAMTERENE AND HYDROCHLOROTHIAZIDE 37.5; 25 MG/1; MG/1
1 TABLET ORAL DAILY
Qty: 30 TABLET | Refills: 0 | Status: SHIPPED | OUTPATIENT
Start: 2024-12-17

## 2024-12-20 DIAGNOSIS — K21.9 GASTROESOPHAGEAL REFLUX DISEASE, UNSPECIFIED WHETHER ESOPHAGITIS PRESENT: ICD-10-CM

## 2024-12-20 DIAGNOSIS — N32.81 OAB (OVERACTIVE BLADDER): ICD-10-CM

## 2024-12-20 DIAGNOSIS — R68.84 JAW PAIN: ICD-10-CM

## 2024-12-20 RX ORDER — SOLIFENACIN SUCCINATE 10 MG/1
10 TABLET, FILM COATED ORAL DAILY
Qty: 30 TABLET | Refills: 0 | Status: SHIPPED | OUTPATIENT
Start: 2024-12-20

## 2024-12-20 NOTE — TELEPHONE ENCOUNTER
Rx Refill Note  Requested Prescriptions     Pending Prescriptions Disp Refills    solifenacin (VESICARE) 10 MG tablet [Pharmacy Med Name: Solifenacin Succinate 10 MG Oral Tablet] 30 tablet 0     Sig: Take 1 tablet by mouth once daily      Last office visit with prescribing clinician: 5/1/2024   Next office visit with prescribing clinician: Visit date not found       Josiane Collier MA  12/20/24, 08:06 EST

## 2024-12-26 ENCOUNTER — OFFICE VISIT (OUTPATIENT)
Dept: CARDIOLOGY | Facility: CLINIC | Age: 44
End: 2024-12-26
Payer: COMMERCIAL

## 2024-12-26 VITALS
HEART RATE: 76 BPM | TEMPERATURE: 97.6 F | WEIGHT: 289 LBS | DIASTOLIC BLOOD PRESSURE: 64 MMHG | BODY MASS INDEX: 49.34 KG/M2 | RESPIRATION RATE: 20 BRPM | HEIGHT: 64 IN | OXYGEN SATURATION: 96 % | SYSTOLIC BLOOD PRESSURE: 122 MMHG

## 2024-12-26 DIAGNOSIS — E78.2 MIXED HYPERLIPIDEMIA: ICD-10-CM

## 2024-12-26 DIAGNOSIS — I10 ESSENTIAL HYPERTENSION: ICD-10-CM

## 2024-12-26 DIAGNOSIS — F32.A ANXIETY AND DEPRESSION: ICD-10-CM

## 2024-12-26 DIAGNOSIS — Z72.0 TOBACCO USE: ICD-10-CM

## 2024-12-26 DIAGNOSIS — R07.89 CHEST PRESSURE: ICD-10-CM

## 2024-12-26 DIAGNOSIS — R06.09 DYSPNEA ON EXERTION: Primary | ICD-10-CM

## 2024-12-26 DIAGNOSIS — F41.9 ANXIETY AND DEPRESSION: ICD-10-CM

## 2024-12-26 RX ORDER — NIFEDIPINE 60 MG/1
60 TABLET, EXTENDED RELEASE ORAL DAILY
Qty: 90 TABLET | Refills: 1 | Status: SHIPPED | OUTPATIENT
Start: 2024-12-26

## 2024-12-26 RX ORDER — LOSARTAN POTASSIUM 50 MG/1
50 TABLET ORAL DAILY
Qty: 90 TABLET | Refills: 1 | Status: SHIPPED | OUTPATIENT
Start: 2024-12-26

## 2024-12-26 NOTE — ASSESSMENT & PLAN NOTE
Workup is negative with stress test and coronary CTA.  Patient experienced chest tightness when she placed a nicotine patch on, possibly vasoactive microvascular disease/endothelial dysfunction.  Frequently not improved by nitroglycerin sublingual.  Significant improvement since last visit after initiation of carvedilol.  Some chest pain events postprandial associated with dysphagia.  Plan:  Smoking cessation discussed extensively  Risk factors modification with aggressive blood pressure control and lipid-lowering therapy as above  Nitroglycerin sublingual provided as needed chest pains as discussed  Consider GI workup as per PCP   Dr. JAKI Blake

## 2024-12-26 NOTE — ASSESSMENT & PLAN NOTE
Hypertension is stable and controlled.  Uptitrate losartan and decrease nifedipine dosing as per orders.    Goal heart rate 60-70 bpm - to target on carvedilol 12.5 mg p.o. twice daily.  Dietary sodium restriction.  Weight loss.  Regular aerobic exercise.  Stop smoking.  Ambulatory blood pressure monitoring.  Blood pressure will be reassessed in 6 months.

## 2024-12-26 NOTE — ASSESSMENT & PLAN NOTE
Multifactorial.  Lung disease likely in the setting of smoking 2 packs/day, and exam suggestive of bronchitis.  Pulmonary workup as per patient negative.  No volume overload on exam.  Echo normal with normal PA pressures.  Blood pressure controlled.  Deconditioning.  Stress test and coronary CTA with no severe CAD.  Plan:  Patient counseled in regards to heart healthy, low fat/ low cholesterol/low sat diet, daily exercise for 30 minutes, low to moderate intensity, and weight loss.  Smoking cessation discussed extensively  Risk factors modification:  Blood pressure management as per hypertension  Lipid-lowering therapy as per hyperlipidemia

## 2024-12-26 NOTE — PROGRESS NOTES
"[facility]  [facility]  [address] [place] [address]  Dept: [phone]  Dept Fax: [phone]    Date: 12/26/2024  Patient: Gina Tiwari  YOB: 1980    Follow Up Office Visit Note    Interval Follow-up  Ms. Gina Tiwari is a 44 y.o. female who is here for follow-up on Chest Pain (6 month follow up).    Subjective   Patient with occasional chest pains, some responsive to nitroglycerin sublingual.  Chest pain occurring mostly after food, also patient complaining of dysphagia \"like she forgot how to swallow\".  Patient denies orthopnea, PND, palpitations, lightheadedness, syncope or medications side-effects.    The following portions of the patient's history were reviewed and updated as appropriate: allergies, current medications, past family history, past medical history, past social history, past surgical history, and problem list.    Medications:   Allergies   Allergen Reactions    Lisinopril Cough    Claritin-D 24 Hour [Loratadine-Pseudoephedrine Er] Other (See Comments)     Couldn't sleep    Loratadine Anxiety    Morphine Itching    Pseudoephedrine Anxiety      Current Outpatient Medications   Medication Instructions    albuterol sulfate  (90 Base) MCG/ACT inhaler 2 puffs, Inhalation, Every 4 Hours PRN    carvedilol (COREG) 12.5 mg, Oral, 2 Times Daily    clonazePAM (KLONOPIN) 1 mg, Oral, 2 Times Daily PRN    diclofenac (VOLTAREN) 50 mg, Oral, 2 Times Daily    ferrous gluconate (FERGON) 324 mg, Oral, Daily With Breakfast    FLUoxetine (PROZAC) 60 mg, Oral, Daily    levothyroxine (SYNTHROID, LEVOTHROID) 50 mcg, Oral, Daily    losartan (COZAAR) 50 mg, Oral, Daily    meclizine (ANTIVERT) 12.5 mg, 3 Times Daily PRN    metFORMIN (GLUCOPHAGE) 1,000 mg, Oral, 2 Times Daily With Meals    methenamine (HIPREX) 1 g, Oral, 2 Times Daily With Meals    methocarbamol (ROBAXIN) 500 MG tablet Take one tablet by oral route twice a day, as needed " "   montelukast (SINGULAIR) 10 mg, Oral, Nightly    NIFEdipine XL (PROCARDIA XL) 60 mg, Oral, Daily    nitroglycerin (NITROSTAT) 0.4 MG SL tablet 1 under the tongue as needed for angina, may repeat every 5 mins for up three doses.  If no resolution of chest pain, call 911 or head to the nearest emergency room.    norethindrone (AYGESTIN) 5 mg, Oral, Daily    omega-3 acid ethyl esters (LOVAZA) 2 g, Oral, 2 Times Daily    omeprazole (PRILOSEC) 20 mg, Oral, 2 Times Daily    ondansetron ODT (ZOFRAN-ODT) 4 mg, Every 8 Hours PRN    rosuvastatin (CRESTOR) 10 mg, Oral, Nightly    solifenacin (VESICARE) 10 mg, Oral, Daily    triamterene-hydrochlorothiazide (MAXZIDE-25) 37.5-25 MG per tablet 1 tablet, Oral, Daily       Tobacco Use: High Risk (12/26/2024)    Patient History     Smoking Tobacco Use: Every Day     Smokeless Tobacco Use: Never     Passive Exposure: Current        Objective  Vitals:    12/26/24 1431   BP: 122/64   BP Location: Right arm   Patient Position: Sitting   Cuff Size: Large Adult   Pulse: 76   Resp: 20   Temp: 97.6 °F (36.4 °C)   TempSrc: Infrared   SpO2: 96%   Weight: 131 kg (289 lb)   Height: 162.6 cm (64\")   PainSc:   3   PainLoc: Back      Vitals:    12/26/24 1431   BP: 122/64   BP Location: Right arm   Patient Position: Sitting   Cuff Size: Large Adult   Pulse: 76   Resp: 20   Temp: 97.6 °F (36.4 °C)   TempSrc: Infrared   SpO2: 96%   Weight: 131 kg (289 lb)   Height: 162.6 cm (64\")          Physical Exam  Constitutional:       Appearance: Not in distress.   Neck:      Vascular: JVD normal.   Pulmonary:      Breath sounds: Normal breath sounds.   Cardiovascular:      Normal rate. Regular rhythm.      Murmurs: There is no murmur.      Comments: Trace bilateral lower extremities edema, nonpitting, poor circulation  Pulses:     Intact distal pulses.   Edema:     Thigh: bilateral trace edema of the thigh.     Pretibial: bilateral trace edema of the pretibial area.     Ankle: bilateral trace edema of the " "ankle.     Feet: bilateral trace edema of the feet.  Neurological:      Mental Status: Alert.              Diagnostic Data  Lab Results   Component Value Date     04/16/2024    K 4.2 04/16/2024     04/16/2024    CO2 18 (L) 04/16/2024    MG 1.9 10/30/2023    BUN 12 04/16/2024    CREATININE 0.79 04/16/2024    CALCIUM 9.9 04/16/2024    BILITOT 0.2 04/16/2024    ALKPHOS 47 04/16/2024    ALT 22 04/16/2024    AST 25 04/16/2024    GLUCOSE 124 (H) 04/16/2024    ALBUMIN 4.6 04/16/2024     Lab Results   Component Value Date    WBC 6.4 06/25/2024    HGB 13.0 06/25/2024    HCT 38.0 06/25/2024     06/25/2024     No results found for: \"APTT\", \"INR\", \"PTT\"  Lab Results   Component Value Date    TROPONINT 6 10/31/2023     Lab Results   Component Value Date    PROBNP <36 04/23/2024       Lab Results   Component Value Date    CHLPL 124 04/16/2024    TRIG 269 (H) 04/16/2024    HDL 35 (L) 04/16/2024    LDL 47 04/16/2024     Lab Results   Component Value Date    TSH 1.840 04/16/2024    FREET4 1.27 04/28/2022       CV Diagnostics:  Procedures    CXR: Results for orders placed in visit on 10/23/24    XR Chest PA & Lateral (In Office)    Narrative  XR CHEST PA AND LATERAL    Date of Exam: 10/23/2024 2:38 PM EDT    Indication: cough, thinks aspirated food last week    Comparison: Two-view chest x-ray 12/27/2022    Findings:  Lungs are adequately expanded and appear clear. No consolidation or pleural effusion is seen. Cardiomediastinal contours appear within normal limits. There are degenerative changes in the thoracic spine.    Impression  Impression:  No acute cardiopulmonary abnormality is identified.      Electronically Signed: Yaneth Arevalo  10/24/2024 3:38 PM EDT  Workstation ID: [id number]     ECHO/MUGA: Results for orders placed during the hospital encounter of 01/26/23    Adult Transthoracic Echo Complete W/ Cont if Necessary Per Protocol    Interpretation Summary    Left ventricular systolic function is normal. " Estimated left ventricular EF = 55%    Trace mitral regurgitation.    Trace tricuspid regurgitation with normal RVSP.     STRESS TESTS: Results for orders placed during the hospital encounter of 01/26/23    Stress Test With Myocardial Perfusion One Day    Interpretation Summary    Lexiscan stress test completed without complications    The test is negative for typical angina or ischemic ST segment changes.  The patient reported constant left-sided dull ache which resolved in recovery.    Diaphragmatic attenuation and GI artifacts are present.    Myocardial perfusion imaging indicates a normal myocardial perfusion study with no evidence of ischemia.    Left ventricular ejection fraction is hyperdynamic (Calculated EF > 70%).    Impressions are consistent with a low risk study.     CARDIAC CATH: No results found for this or any previous visit.     DEVICES: No valid procedures specified.   HOLTER: Results for orders placed in visit on 03/20/23    Holter Monitor - 48 Hour    Interpretation Summary    A normal monitor study.    Rare PAC`S and PVC`S    Max . Min Hr 76 and average     Complaints of skip beats correlates with single PAC`S    Heart racing and fatigue sinus rhythm and sinus tachycardia     CT/MRI:  Results for orders placed during the hospital encounter of 02/23/24    CT Angiogram Coronary    Narrative  CT ANGIOGRAM CORONARY    Date of Exam: 2/23/2024 2:28 PM EST    Indication: Chest pain/anginal equivalent, intermediate CAD risk, not treadmill candidate  chest pain continued complaints, smoker, diabetes mellitus.    Comparison: None available.    Technique: CTA of the chest was performed after the uneventful intravenous administration of 100 cc of Isovue-370 contrast. Reconstructed coronal and sagittal images were also obtained. In addition, a 3-D volume rendered image was created for  interpretation. Automated exposure control and iterative reconstruction methods were used.      Findings:  The  visualized hilar and mediastinal regions are normal. The visualized pleural spaces are clear. There are bandlike linear densities in the lower lobes representing subsegmental atelectasis versus small pulmonary scars. There is degenerative spondylosis  in the visualized thoracic spine. There are no acute findings beneath the hemidiaphragms.    Impression  Impression:  No significant noncardiac CT findings.    Please refer to CT Angiogram Coronary   Cardiology interpretation report for information on cardiac structures.    Electronically Signed: Mark Murphy MD  2/25/2024 5:56 PM EST  Workstation ID: [id number]    VASCULAR: No valid procedures specified.     Assessment and Plan  Diagnoses and all orders for this visit:    1. Dyspnea on exertion (Primary)  Assessment & Plan:  Multifactorial.  Lung disease likely in the setting of smoking 2 packs/day, and exam suggestive of bronchitis.  Pulmonary workup as per patient negative.  No volume overload on exam.  Echo normal with normal PA pressures.  Blood pressure controlled.  Deconditioning.  Stress test and coronary CTA with no severe CAD.  Plan:  Patient counseled in regards to heart healthy, low fat/ low cholesterol/low sat diet, daily exercise for 30 minutes, low to moderate intensity, and weight loss.  Smoking cessation discussed extensively  Risk factors modification:  Blood pressure management as per hypertension  Lipid-lowering therapy as per hyperlipidemia    Orders:  -     losartan (COZAAR) 50 MG tablet; Take 1 tablet by mouth Daily.  Dispense: 90 tablet; Refill: 1    2. Mixed hyperlipidemia  Assessment & Plan:    Lipid abnormalities are improving on medical therapy.     Plan:  Continue same medication/s without change.  Rosuvastatin 10 mg p.o. daily.     Discussed medication dosage, use, side effects, and goals of treatment in detail.    Counseled patient on lifestyle modifications to help control hyperlipidemia.   Cholesterol lowering dietary information shared  "with patient.  Advised patient to exercise for 150 minutes weekly. (30 minute brisk walk, 5 days a week for example)  Weight Loss encouraged  Stop tobacco use encouraged  Discussed with patient extensively low carbohydrate diet.  Patient started having pop tarts in the morning as \"comfort food\" , around the same time where the lipid profile turned around.  Her kids are living home and this is making her very emotional and making her diet worse.  Discussed extensively and patient agreeable on lowering her carbohydrate diet.  Lab Results   Component Value Date    LDL 47 04/16/2024    LDL 51 04/04/2023    HDL 35 (L) 04/16/2024    HDL 40 04/04/2023    CHLPL 124 04/16/2024    CHLPL 113 04/04/2023    TRIG 269 (H) 04/16/2024    TRIG 120 04/04/2023         Patient Treatment Goals:   LDL goal is less than 70; to target     Followup in 6 months.      3. Chest pressure  Assessment & Plan:  Workup is negative with stress test and coronary CTA.  Patient experienced chest tightness when she placed a nicotine patch on, possibly vasoactive microvascular disease/endothelial dysfunction.  Frequently not improved by nitroglycerin sublingual.  Significant improvement since last visit after initiation of carvedilol.  Some chest pain events postprandial associated with dysphagia.  Plan:  Smoking cessation discussed extensively  Risk factors modification with aggressive blood pressure control and lipid-lowering therapy as above  Nitroglycerin sublingual provided as needed chest pains as discussed  Consider GI workup as per PCP      4. Essential hypertension  Assessment & Plan:  Hypertension is stable and controlled.  Uptitrate losartan and decrease nifedipine dosing as per orders.    Goal heart rate 60-70 bpm - to target on carvedilol 12.5 mg p.o. twice daily.  Dietary sodium restriction.  Weight loss.  Regular aerobic exercise.  Stop smoking.  Ambulatory blood pressure monitoring.  Blood pressure will be reassessed in 6 " months.      Orders:  -     NIFEdipine XL (PROCARDIA XL) 60 MG 24 hr tablet; Take 1 tablet by mouth Daily.  Dispense: 90 tablet; Refill: 1    5. Tobacco use  Assessment & Plan:  Ms. Gina Tiwari  reports that she has been smoking cigarettes. She started smoking about 32 years ago. She has a 32 pack-year smoking history. She has been exposed to tobacco smoke. She has never used smokeless tobacco..  I have educated her on the risk of diseases from using tobacco products such as cancer, COPD, heart disease, and cataracts.   I advised her to quit and she is willing to quit. We have discussed the following method/s for tobacco cessation:  Counseling.    I spent 7 minutes counseling the patient.              Return in about 6 months (around 6/26/2025) for Follow-up with Dr Naylor.    There are no Patient Instructions on file for this visit.    Trace Naylor MD

## 2024-12-26 NOTE — ASSESSMENT & PLAN NOTE
"  Lipid abnormalities are improving on medical therapy.     Plan:  Continue same medication/s without change.  Rosuvastatin 10 mg p.o. daily.     Discussed medication dosage, use, side effects, and goals of treatment in detail.    Counseled patient on lifestyle modifications to help control hyperlipidemia.   Cholesterol lowering dietary information shared with patient.  Advised patient to exercise for 150 minutes weekly. (30 minute brisk walk, 5 days a week for example)  Weight Loss encouraged  Stop tobacco use encouraged  Discussed with patient extensively low carbohydrate diet.  Patient started having pop tarts in the morning as \"comfort food\" , around the same time where the lipid profile turned around.  Her kids are living home and this is making her very emotional and making her diet worse.  Discussed extensively and patient agreeable on lowering her carbohydrate diet.  Lab Results   Component Value Date    LDL 47 04/16/2024    LDL 51 04/04/2023    HDL 35 (L) 04/16/2024    HDL 40 04/04/2023    CHLPL 124 04/16/2024    CHLPL 113 04/04/2023    TRIG 269 (H) 04/16/2024    TRIG 120 04/04/2023         Patient Treatment Goals:   LDL goal is less than 70; to target     Followup in 6 months.  "

## 2024-12-26 NOTE — ASSESSMENT & PLAN NOTE
Ms. Gina Tiwari  reports that she has been smoking cigarettes. She started smoking about 32 years ago. She has a 32 pack-year smoking history. She has been exposed to tobacco smoke. She has never used smokeless tobacco..  I have educated her on the risk of diseases from using tobacco products such as cancer, COPD, heart disease, and cataracts.   I advised her to quit and she is willing to quit. We have discussed the following method/s for tobacco cessation:  Counseling.    I spent 7 minutes counseling the patient.

## 2024-12-30 DIAGNOSIS — J30.9 ALLERGIC RHINITIS, UNSPECIFIED SEASONALITY, UNSPECIFIED TRIGGER: ICD-10-CM

## 2024-12-30 RX ORDER — MONTELUKAST SODIUM 10 MG/1
10 TABLET ORAL NIGHTLY
Qty: 30 TABLET | Refills: 0 | Status: SHIPPED | OUTPATIENT
Start: 2024-12-30

## 2025-01-03 ENCOUNTER — TELEPHONE (OUTPATIENT)
Dept: ONCOLOGY | Facility: CLINIC | Age: 45
End: 2025-01-03

## 2025-01-03 NOTE — TELEPHONE ENCOUNTER
Caller: Gina Tiwari    Relationship to patient: Self    Best call back number: [phone]    Type of visit: FOLLOW UP    Requested date: MONDAY AFTERNOON PREFERABLY     If rescheduling, when is the original appointment: 01/10     Additional notes: PLEASE CALL TO R/S.

## 2025-01-08 DIAGNOSIS — D50.9 IRON DEFICIENCY ANEMIA, UNSPECIFIED IRON DEFICIENCY ANEMIA TYPE: ICD-10-CM

## 2025-01-08 RX ORDER — FERROUS GLUCONATE 324(38)MG
1 TABLET ORAL
Qty: 30 TABLET | Refills: 0 | Status: SHIPPED | OUTPATIENT
Start: 2025-01-08

## 2025-01-11 DIAGNOSIS — I10 ESSENTIAL HYPERTENSION: ICD-10-CM

## 2025-01-13 RX ORDER — TRIAMTERENE AND HYDROCHLOROTHIAZIDE 37.5; 25 MG/1; MG/1
1 TABLET ORAL DAILY
Qty: 30 TABLET | Refills: 2 | Status: SHIPPED | OUTPATIENT
Start: 2025-01-13

## 2025-01-14 ENCOUNTER — OFFICE VISIT (OUTPATIENT)
Dept: ONCOLOGY | Facility: CLINIC | Age: 45
End: 2025-01-14
Payer: COMMERCIAL

## 2025-01-14 ENCOUNTER — TELEPHONE (OUTPATIENT)
Dept: ONCOLOGY | Facility: CLINIC | Age: 45
End: 2025-01-14
Payer: COMMERCIAL

## 2025-01-14 ENCOUNTER — OFFICE VISIT (OUTPATIENT)
Dept: UROLOGY | Facility: CLINIC | Age: 45
End: 2025-01-14
Payer: COMMERCIAL

## 2025-01-14 ENCOUNTER — LAB (OUTPATIENT)
Dept: LAB | Facility: HOSPITAL | Age: 45
End: 2025-01-14
Payer: COMMERCIAL

## 2025-01-14 VITALS
RESPIRATION RATE: 20 BRPM | DIASTOLIC BLOOD PRESSURE: 69 MMHG | BODY MASS INDEX: 49.17 KG/M2 | OXYGEN SATURATION: 97 % | HEIGHT: 64 IN | HEART RATE: 84 BPM | SYSTOLIC BLOOD PRESSURE: 139 MMHG | WEIGHT: 288 LBS

## 2025-01-14 VITALS
HEIGHT: 64 IN | SYSTOLIC BLOOD PRESSURE: 120 MMHG | OXYGEN SATURATION: 99 % | DIASTOLIC BLOOD PRESSURE: 75 MMHG | WEIGHT: 293 LBS | BODY MASS INDEX: 50.02 KG/M2 | HEART RATE: 74 BPM

## 2025-01-14 DIAGNOSIS — D50.8 IRON DEFICIENCY ANEMIA SECONDARY TO INADEQUATE DIETARY IRON INTAKE: ICD-10-CM

## 2025-01-14 DIAGNOSIS — K90.9 IRON MALABSORPTION: ICD-10-CM

## 2025-01-14 DIAGNOSIS — R31.29 MICROSCOPIC HEMATURIA: ICD-10-CM

## 2025-01-14 DIAGNOSIS — N39.0 RECURRENT UTI: Primary | ICD-10-CM

## 2025-01-14 DIAGNOSIS — Z87.898 HISTORY OF GROSS HEMATURIA: ICD-10-CM

## 2025-01-14 DIAGNOSIS — D50.8 IRON DEFICIENCY ANEMIA SECONDARY TO INADEQUATE DIETARY IRON INTAKE: Primary | ICD-10-CM

## 2025-01-14 DIAGNOSIS — N32.81 OAB (OVERACTIVE BLADDER): ICD-10-CM

## 2025-01-14 DIAGNOSIS — R31.0 GROSS HEMATURIA: ICD-10-CM

## 2025-01-14 LAB
BASOPHILS # BLD AUTO: 0.03 10*3/MM3 (ref 0–0.2)
BASOPHILS NFR BLD AUTO: 0.5 % (ref 0–1.5)
BILIRUB BLD-MCNC: NEGATIVE MG/DL
CLARITY, POC: CLEAR
COLOR UR: ABNORMAL
DEPRECATED RDW RBC AUTO: 42.4 FL (ref 37–54)
EOSINOPHIL # BLD AUTO: 0.17 10*3/MM3 (ref 0–0.4)
EOSINOPHIL NFR BLD AUTO: 2.9 % (ref 0.3–6.2)
ERYTHROCYTE [DISTWIDTH] IN BLOOD BY AUTOMATED COUNT: 12.3 % (ref 12.3–15.4)
EXPIRATION DATE: ABNORMAL
FERRITIN SERPL-MCNC: 87.89 NG/ML (ref 13–150)
GLUCOSE UR STRIP-MCNC: NEGATIVE MG/DL
HCT VFR BLD AUTO: 38.4 % (ref 34–46.6)
HGB BLD-MCNC: 13.4 G/DL (ref 12–15.9)
IMM GRANULOCYTES # BLD AUTO: 0.01 10*3/MM3 (ref 0–0.05)
IMM GRANULOCYTES NFR BLD AUTO: 0.2 % (ref 0–0.5)
IRON 24H UR-MRATE: 104 MCG/DL (ref 37–145)
IRON SATN MFR SERPL: 24 % (ref 20–50)
KETONES UR QL: NEGATIVE
LEUKOCYTE EST, POC: NEGATIVE
LYMPHOCYTES # BLD AUTO: 1.68 10*3/MM3 (ref 0.7–3.1)
LYMPHOCYTES NFR BLD AUTO: 28.8 % (ref 19.6–45.3)
Lab: ABNORMAL
MCH RBC QN AUTO: 32.1 PG (ref 26.6–33)
MCHC RBC AUTO-ENTMCNC: 34.9 G/DL (ref 31.5–35.7)
MCV RBC AUTO: 91.9 FL (ref 79–97)
MONOCYTES # BLD AUTO: 0.46 10*3/MM3 (ref 0.1–0.9)
MONOCYTES NFR BLD AUTO: 7.9 % (ref 5–12)
NEUTROPHILS NFR BLD AUTO: 3.49 10*3/MM3 (ref 1.7–7)
NEUTROPHILS NFR BLD AUTO: 59.7 % (ref 42.7–76)
NITRITE UR-MCNC: NEGATIVE MG/ML
PH UR: 6 [PH] (ref 5–8)
PLATELET # BLD AUTO: 262 10*3/MM3 (ref 140–450)
PMV BLD AUTO: 8.3 FL (ref 6–12)
PROT UR STRIP-MCNC: NEGATIVE MG/DL
RBC # BLD AUTO: 4.18 10*6/MM3 (ref 3.77–5.28)
RBC # UR STRIP: ABNORMAL /UL
SP GR UR: 1.01 (ref 1–1.03)
TIBC SERPL-MCNC: 435 MCG/DL (ref 298–536)
TRANSFERRIN SERPL-MCNC: 292 MG/DL (ref 200–360)
UROBILINOGEN UR QL: NORMAL
WBC NRBC COR # BLD AUTO: 5.84 10*3/MM3 (ref 3.4–10.8)

## 2025-01-14 PROCEDURE — 87086 URINE CULTURE/COLONY COUNT: CPT | Performed by: NURSE PRACTITIONER

## 2025-01-14 PROCEDURE — 84466 ASSAY OF TRANSFERRIN: CPT

## 2025-01-14 PROCEDURE — 82728 ASSAY OF FERRITIN: CPT

## 2025-01-14 PROCEDURE — 36415 COLL VENOUS BLD VENIPUNCTURE: CPT

## 2025-01-14 PROCEDURE — 85025 COMPLETE CBC W/AUTO DIFF WBC: CPT

## 2025-01-14 PROCEDURE — 83540 ASSAY OF IRON: CPT

## 2025-01-14 PROCEDURE — 81001 URINALYSIS AUTO W/SCOPE: CPT | Performed by: NURSE PRACTITIONER

## 2025-01-14 RX ORDER — HYDROXYZINE HYDROCHLORIDE 25 MG/1
25 TABLET, FILM COATED ORAL AS NEEDED
COMMUNITY

## 2025-01-14 RX ORDER — SOLIFENACIN SUCCINATE 10 MG/1
10 TABLET, FILM COATED ORAL DAILY
Qty: 60 TABLET | Refills: 1 | Status: SHIPPED | OUTPATIENT
Start: 2025-01-14

## 2025-01-14 RX ORDER — BENZONATATE 200 MG/1
200 CAPSULE ORAL 2 TIMES DAILY PRN
COMMUNITY
Start: 2025-01-09

## 2025-01-14 RX ORDER — FLUCONAZOLE 200 MG/1
200 TABLET ORAL DAILY
COMMUNITY

## 2025-01-14 RX ORDER — SULFAMETHOXAZOLE AND TRIMETHOPRIM 800; 160 MG/1; MG/1
1 TABLET ORAL 2 TIMES DAILY
COMMUNITY
Start: 2025-01-09

## 2025-01-14 RX ORDER — NITROFURANTOIN 25; 75 MG/1; MG/1
100 CAPSULE ORAL NIGHTLY
Qty: 90 CAPSULE | Refills: 1 | Status: SHIPPED | OUTPATIENT
Start: 2025-01-14

## 2025-01-14 NOTE — PROGRESS NOTES
Follow Up Office Visit      Patient Name: Gina Tiwari  : 1980   MRN: [id number]     Chief Complaint:    Chief Complaint   Patient presents with    Recurrent UTI    Overactive bladder       Referring Provider: No ref. provider found    History of Present Illness: Gina Tiwari is a 44 y.o. female who presents today for follow up of recurrent UTI, overactive bladder and history of gross hematuria. She previously underwent gross hematuria workup with Dr. Rizzo 2024 which was negative for malignancy.  She has been managed on Hiprex twice daily for UTI prophylaxis since May, 2024 and reports she has had at least 4 UTIs since being on Hiprex.  She was most recently seen by [facility] in [place] at the end of December and was diagnosed with UTI.  We have requested records and urine culture at that time with E. coli.  [facility] then [facility] to an urgent care due to continued symptoms.  She is currently taking Bactrim and has 3 days left of antibiotic course.    She is a current everyday smoker 1-1/2 packs/day.  She does report soaking in the bathtub due to back pain and feels this may be attributing to her UTIs.    She drinks 3-4 sweet teas per day.     She is still taking Vesicare 10mg daily for OAB. She does report dry mouth however reports dry mouth prior to vesicare.     Urinalysis with trace blood. Will send for culture and UA micro.    Subjective      Review of System: Review of Systems   Genitourinary:  Positive for dysuria and urgency. Negative for hematuria.   All other systems reviewed and are negative.     I have reviewed the ROS documented by my clinical staff, I have updated appropriately and I agree. BENY Solorzano    I have reviewed and the following portions of the patient's history were updated as appropriate: past family history, past medical history, past social history, past surgical history and  problem list.    Medications:     Current Outpatient Medications:     albuterol sulfate  (90 Base) MCG/ACT inhaler, Inhale 2 puffs Every 4 (Four) Hours As Needed for Wheezing., Disp: 18 g, Rfl: 5    benzonatate (TESSALON) 200 MG capsule, Take 1 capsule by mouth 2 (Two) Times a Day As Needed., Disp: , Rfl:     carvedilol (COREG) 12.5 MG tablet, Take 1 tablet by mouth 2 (Two) Times a Day., Disp: 180 tablet, Rfl: 3    clonazePAM (KlonoPIN) 1 MG tablet, Take 1 tablet by mouth 2 (Two) Times a Day As Needed for Anxiety (with flying)., Disp: 10 tablet, Rfl: 0    diclofenac (VOLTAREN) 50 MG EC tablet, Take 1 tablet by mouth twice daily, Disp: 60 tablet, Rfl: 0    ferrous gluconate (FERGON) 324 MG tablet, Take 1 tablet by mouth once daily with breakfast, Disp: 30 tablet, Rfl: 0    fluconazole (DIFLUCAN) 200 MG tablet, Take 1 tablet by mouth Daily., Disp: , Rfl:     FLUoxetine (PROzac) 20 MG capsule, TAKE 3 CAPSULES BY MOUTH ONCE DAILY, Disp: 90 capsule, Rfl: 2    hydrOXYzine (ATARAX) 25 MG tablet, Take 1 tablet by mouth As Needed., Disp: , Rfl:     levothyroxine (SYNTHROID, LEVOTHROID) 50 MCG tablet, Take 1 tablet by mouth once daily, Disp: 90 tablet, Rfl: 0    losartan (COZAAR) 50 MG tablet, Take 1 tablet by mouth Daily., Disp: 90 tablet, Rfl: 1    meclizine (ANTIVERT) 25 MG tablet, Take 0.5 tablets by mouth 3 (Three) Times a Day As Needed for Dizziness., Disp: , Rfl:     metFORMIN (GLUCOPHAGE) 1000 MG tablet, Take 1 tablet by mouth 2 (Two) Times a Day With Meals., Disp: 180 tablet, Rfl: 1    methocarbamol (ROBAXIN) 500 MG tablet, Take one tablet by oral route twice a day, as needed, Disp: 15 tablet, Rfl: 0    montelukast (SINGULAIR) 10 MG tablet, TAKE 1 TABLET BY MOUTH ONCE DAILY AT NIGHT, Disp: 30 tablet, Rfl: 0    NIFEdipine XL (PROCARDIA XL) 60 MG 24 hr tablet, Take 1 tablet by mouth Daily., Disp: 90 tablet, Rfl: 1    nitroglycerin (NITROSTAT) 0.4 MG SL tablet, 1 under the tongue as needed for angina, may repeat  every 5 mins for up three doses.  If no resolution of chest pain, call 911 or head to the nearest emergency room., Disp: 100 tablet, Rfl: 11    norethindrone (AYGESTIN) 5 MG tablet, Take 1 tablet by mouth Daily., Disp: 30 tablet, Rfl: 0    omega-3 acid ethyl esters (LOVAZA) 1 g capsule, Take 2 capsules by mouth 2 (Two) Times a Day., Disp: 360 capsule, Rfl: 3    omeprazole (priLOSEC) 20 MG capsule, Take 1 capsule by mouth twice daily, Disp: 60 capsule, Rfl: 0    ondansetron ODT (ZOFRAN-ODT) 4 MG disintegrating tablet, 1 tablet Every 8 (Eight) Hours As Needed for Nausea or Vomiting., Disp: , Rfl:     rosuvastatin (CRESTOR) 10 MG tablet, Take 1 tablet by mouth Every Night., Disp: 90 tablet, Rfl: 3    solifenacin (VESICARE) 10 MG tablet, Take 1 tablet by mouth Daily., Disp: 60 tablet, Rfl: 1    sulfamethoxazole-trimethoprim (BACTRIM DS,SEPTRA DS) 800-160 MG per tablet, Take 1 tablet by mouth 2 (Two) Times a Day., Disp: , Rfl:     triamterene-hydrochlorothiazide (MAXZIDE-25) 37.5-25 MG per tablet, Take 1 tablet by mouth once daily, Disp: 30 tablet, Rfl: 2    nitrofurantoin, macrocrystal-monohydrate, (Macrobid) 100 MG capsule, Take 1 capsule by mouth Every Night., Disp: 90 capsule, Rfl: 1    Allergies:   Allergies   Allergen Reactions    Lisinopril Cough    Claritin-D 24 Hour [Loratadine-Pseudoephedrine Er] Other (See Comments)     Couldn't sleep    Loratadine Anxiety    Morphine Itching    Pseudoephedrine Anxiety     Bladder & Bowel Symptom Questionnaire    How often do you usually urinate during the day ?   3 - About every 1-2 hours   2.   How many timed do you urinate at night?   4 - 5 or more times at night   3.   What is the reason that you usually urinate?   3 - Severe urge (can delay less than 10 min)   4.   Once you get the urge to go, how long can you     comfortably delay?   2 - 10-30 min   5.   How often do you get a sudden urge that makes you rush to the bathroom?   2 - A few times a month   6.   How often  "does a sudden urge to urinate result in you leaking urine or wetting pads?   1 - Rarely   7.  In your opinion, how good is your bladder control?   2 - Good   8.  Do you have accidental bowel leakage?   no   9.  Do you have difficulty fully emptying your bladder?   no   10.  Do you experience accidental leakage when performing some physical activity such as coughing, sneezing, laughing or exercise?   yes   11. Have you tried medications to help improve your symptoms?   yes   12. Would you be interested in learning about a long-lasting option that may help you with your symptoms?   no                                                                             Total Score   13     0-7 (Mild) 8-16 (Moderate) 17-28 (Severe)        Objective     Physical Exam:   Vital Signs:   Vitals:    01/14/25 1322   BP: 120/75   Pulse: 74   SpO2: 99%   Weight: 133 kg (294 lb)   Height: 162.6 cm (64\")     Body mass index is 50.46 kg/m².     Physical Exam  Vitals and nursing note reviewed.   Constitutional:       Appearance: Normal appearance.   HENT:      Head: Normocephalic and atraumatic.      Nose: Nose normal.      Mouth/Throat:      Mouth: Mucous membranes are moist.   Eyes:      Pupils: Pupils are equal, round, and reactive to light.   Pulmonary:      Effort: Pulmonary effort is normal.   Abdominal:      General: Abdomen is flat.      Palpations: Abdomen is soft.   Musculoskeletal:         General: Normal range of motion.      Cervical back: Normal range of motion.   Skin:     General: Skin is warm and dry.      Capillary Refill: Capillary refill takes less than 2 seconds.   Neurological:      General: No focal deficit present.      Mental Status: She is alert.   Psychiatric:         Mood and Affect: Mood normal.       Labs:   Brief Urine Lab Results  (Last result in the past 365 days)        Color   Clarity   Blood   Leuk Est   Nitrite   Protein   CREAT   Urine HCG        01/14/25 1550 Yellow   Clear   Trace   Negative   " Negative   Negative                        Lab Results   Component Value Date    GLUCOSE 124 (H) 04/16/2024    CALCIUM 9.9 04/16/2024     04/16/2024    K 4.2 04/16/2024    CO2 18 (L) 04/16/2024     04/16/2024    BUN 12 04/16/2024    CREATININE 0.79 04/16/2024    BCR 15 04/16/2024       Lab Results   Component Value Date    WBC 5.84 01/14/2025    HGB 13.4 01/14/2025    HCT 38.4 01/14/2025    MCV 91.9 01/14/2025     01/14/2025       Images:   XR Chest PA & Lateral (In Office)    Result Date: 10/24/2024  Impression: No acute cardiopulmonary abnormality is identified. Electronically Signed: Yaneth Arevalo  10/24/2024 3:38 PM EDT  Workstation ID: [id number]      Measures:   Tobacco:   Gina Tiwari  reports that she has been smoking cigarettes. She started smoking about 32 years ago. She has a 48.1 pack-year smoking history. She has been exposed to tobacco smoke. She has never used smokeless tobacco.     I advised her to quit and she is not willing to quit.            Urine Incontinence: Patient reports that she is occasionally experiencing symptoms of urinary incontinence.      Assessment / Plan      Assessment/Plan:   44 y.o. female who presented today for follow up of recurrent UTI, history of gross hematuria and overactive bladder.  She will continue Vesicare for overactive bladder.  We discussed once she has completed Bactrim she will discontinue her Hiprex and she will begin Macrobid nightly for UTI prophylaxis based on last urine culture.  She will follow-up in 3 months for symptom check.  She is agreeable plan of care.  We discussed smoking cessation.    Diagnoses and all orders for this visit:    1. Recurrent UTI (Primary)  -     POC Urinalysis Dipstick, Automated  -     Urine Culture - Urine, Urine, Clean Catch; Future  -     Urine Culture - Urine, Urine, Clean Catch  -     nitrofurantoin, macrocrystal-monohydrate, (Macrobid) 100 MG capsule; Take 1 capsule by mouth Every Night.  Dispense:  90 capsule; Refill: 1    2. Gross hematuria    3. OAB (overactive bladder)  -     solifenacin (VESICARE) 10 MG tablet; Take 1 tablet by mouth Daily.  Dispense: 60 tablet; Refill: 1    4. Microscopic hematuria  -     Urinalysis With Microscopic - Urine, Clean Catch; Future  -     Urinalysis With Microscopic - Urine, Clean Catch    5. History of gross hematuria         Follow Up:   Return in about 3 months (around 4/14/2025).    I spent approximately 25 minutes providing clinical care for this patient; including review of patient's chart and provider documentation, face to face time spent with patient in examination room (obtaining history, performing physical exam, discussing diagnosis and management options), placing orders, and completing patient documentation.     BENY Meza  [facility] [place]

## 2025-01-14 NOTE — TELEPHONE ENCOUNTER
Caller: Gina Tiwari    Relationship: Self    Best call back number: [phone]      Who are you requesting to speak with (clinical staff, provider,  specific staff member): SCHEDULING       What was the call regarding: PT ASKING IF [facility] HAS ANY CANCELLATIONS THIS AFTERNOON AROUND 2:30 OR 3.  SHE IS IN THE AREA FOR ANOTHER APPT AND WOULD LIKE TO BE SEEN TODAY INSTEAD OF 1/15 IF POSSIBLE    PLEASE ADVISE

## 2025-01-14 NOTE — PROGRESS NOTES
PROBLEM LIST:    1.  Iron deficiency anemia secondary to malabsorption due to chronic PPI use    REASON FOR VISIT: Iron deficiency    HISTORY OF PRESENT ILLNESS:   44 y.o.  female presents today for follow-up on iron deficiency.  She received IV iron in spring 2024.  She has been doing well since.  She has been having frequent urinary tract infections and saw urology today.  Otherwise no new issues or concerns reported.    Past medical history, social history and family history was reviewed 01/14/25 and unchanged from prior visit.    Review of Systems:    Review of Systems   Constitutional: Negative.    HENT:  Negative.     Respiratory: Negative.     Cardiovascular: Negative.    Gastrointestinal: Negative.    Genitourinary:          Frequent urinary tract infections   Musculoskeletal: Negative.    Neurological: Negative.    Hematological: Negative.         Medications:      Current Outpatient Medications:     albuterol sulfate  (90 Base) MCG/ACT inhaler, Inhale 2 puffs Every 4 (Four) Hours As Needed for Wheezing., Disp: 18 g, Rfl: 5    benzonatate (TESSALON) 200 MG capsule, Take 1 capsule by mouth 2 (Two) Times a Day As Needed., Disp: , Rfl:     carvedilol (COREG) 12.5 MG tablet, Take 1 tablet by mouth 2 (Two) Times a Day., Disp: 180 tablet, Rfl: 3    clonazePAM (KlonoPIN) 1 MG tablet, Take 1 tablet by mouth 2 (Two) Times a Day As Needed for Anxiety (with flying)., Disp: 10 tablet, Rfl: 0    diclofenac (VOLTAREN) 50 MG EC tablet, Take 1 tablet by mouth twice daily, Disp: 60 tablet, Rfl: 0    ferrous gluconate (FERGON) 324 MG tablet, Take 1 tablet by mouth once daily with breakfast, Disp: 30 tablet, Rfl: 0    fluconazole (DIFLUCAN) 200 MG tablet, Take 1 tablet by mouth Daily., Disp: , Rfl:     FLUoxetine (PROzac) 20 MG capsule, TAKE 3 CAPSULES BY MOUTH ONCE DAILY, Disp: 90 capsule, Rfl: 2    hydrOXYzine (ATARAX) 25 MG tablet, Take 1 tablet by mouth As Needed., Disp: , Rfl:     levothyroxine (SYNTHROID,  LEVOTHROID) 50 MCG tablet, Take 1 tablet by mouth once daily, Disp: 90 tablet, Rfl: 0    losartan (COZAAR) 50 MG tablet, Take 1 tablet by mouth Daily., Disp: 90 tablet, Rfl: 1    meclizine (ANTIVERT) 25 MG tablet, Take 0.5 tablets by mouth 3 (Three) Times a Day As Needed for Dizziness., Disp: , Rfl:     metFORMIN (GLUCOPHAGE) 1000 MG tablet, Take 1 tablet by mouth 2 (Two) Times a Day With Meals., Disp: 180 tablet, Rfl: 1    methocarbamol (ROBAXIN) 500 MG tablet, Take one tablet by oral route twice a day, as needed, Disp: 15 tablet, Rfl: 0    montelukast (SINGULAIR) 10 MG tablet, TAKE 1 TABLET BY MOUTH ONCE DAILY AT NIGHT, Disp: 30 tablet, Rfl: 0    NIFEdipine XL (PROCARDIA XL) 60 MG 24 hr tablet, Take 1 tablet by mouth Daily., Disp: 90 tablet, Rfl: 1    nitroglycerin (NITROSTAT) 0.4 MG SL tablet, 1 under the tongue as needed for angina, may repeat every 5 mins for up three doses.  If no resolution of chest pain, call 911 or head to the nearest emergency room., Disp: 100 tablet, Rfl: 11    norethindrone (AYGESTIN) 5 MG tablet, Take 1 tablet by mouth Daily., Disp: 30 tablet, Rfl: 0    omega-3 acid ethyl esters (LOVAZA) 1 g capsule, Take 2 capsules by mouth 2 (Two) Times a Day., Disp: 360 capsule, Rfl: 3    omeprazole (priLOSEC) 20 MG capsule, Take 1 capsule by mouth twice daily, Disp: 60 capsule, Rfl: 0    ondansetron ODT (ZOFRAN-ODT) 4 MG disintegrating tablet, 1 tablet Every 8 (Eight) Hours As Needed for Nausea or Vomiting., Disp: , Rfl:     rosuvastatin (CRESTOR) 10 MG tablet, Take 1 tablet by mouth Every Night., Disp: 90 tablet, Rfl: 3    solifenacin (VESICARE) 10 MG tablet, Take 1 tablet by mouth Daily., Disp: 60 tablet, Rfl: 1    sulfamethoxazole-trimethoprim (BACTRIM DS,SEPTRA DS) 800-160 MG per tablet, Take 1 tablet by mouth 2 (Two) Times a Day., Disp: , Rfl:     triamterene-hydrochlorothiazide (MAXZIDE-25) 37.5-25 MG per tablet, Take 1 tablet by mouth once daily, Disp: 30 tablet, Rfl: 2    nitrofurantoin,  "macrocrystal-monohydrate, (Macrobid) 100 MG capsule, Take 1 capsule by mouth Every Night., Disp: 90 capsule, Rfl: 1    Pain Medications               clonazePAM (KlonoPIN) 1 MG tablet Take 1 tablet by mouth 2 (Two) Times a Day As Needed for Anxiety (with flying).    diclofenac (VOLTAREN) 50 MG EC tablet Take 1 tablet by mouth twice daily    FLUoxetine (PROzac) 20 MG capsule TAKE 3 CAPSULES BY MOUTH ONCE DAILY    methocarbamol (ROBAXIN) 500 MG tablet Take one tablet by oral route twice a day, as needed           ALLERGIES:    Allergies   Allergen Reactions    Lisinopril Cough    Claritin-D 24 Hour [Loratadine-Pseudoephedrine Er] Other (See Comments)     Couldn't sleep    Loratadine Anxiety    Morphine Itching    Pseudoephedrine Anxiety     Physical Exam    VITAL SIGNS:  /69   Pulse 84   Resp 20   Ht 162.6 cm (64\")   Wt 131 kg (288 lb)   SpO2 97%   BMI 49.44 kg/m²           Wt Readings from Last 3 Encounters:   01/14/25 131 kg (288 lb)   01/14/25 133 kg (294 lb)   12/26/24 131 kg (289 lb)     Body mass index is 49.44 kg/m². Body surface area is 2.29 meters squared.     Performance Status: 0    General: well appearing, in no acute distress  HEENT: sclera anicteric, neck is supple  Lymphatics: no cervical, supraclavicular, or axillary adenopathy  Cardiovascular: regular rate and rhythm, no murmurs, rubs or gallops  Lungs: clear to auscultation bilaterally  Abdomen: soft, nontender, nondistended.  No palpable organomegaly  Extremities: no lower extremity edema  Skin: no rashes, lesions, bruising, or petechiae  Msk:  Shows no weakness of the large muscle groups  Psych: Mood is stable    RECENT LABS:    Lab Results   Component Value Date    HGB 13.4 01/14/2025    HCT 38.4 01/14/2025    MCV 91.9 01/14/2025     01/14/2025    WBC 5.84 01/14/2025    NEUTROABS 3.49 01/14/2025    LYMPHSABS 1.68 01/14/2025    MONOSABS 0.46 01/14/2025    EOSABS 0.17 01/14/2025    BASOSABS 0.03 01/14/2025       Lab Results "   Component Value Date    GLUCOSE 124 (H) 04/16/2024    BUN 12 04/16/2024    CREATININE 0.79 04/16/2024     04/16/2024    K 4.2 04/16/2024     04/16/2024    CO2 18 (L) 04/16/2024    CALCIUM 9.9 04/16/2024    ALBUMIN 4.6 04/16/2024    BILITOT 0.2 04/16/2024    ALKPHOS 47 04/16/2024    AST 25 04/16/2024    ALT 22 04/16/2024     Lab Results   Component Value Date    HGB 13.4 01/14/2025    HGB 13.0 06/25/2024    HGB 12.6 04/16/2024     Lab Results   Component Value Date    FERRITIN 87.89 01/14/2025    FERRITIN 50 06/25/2024    FERRITIN 11.73 (L) 02/23/2024     Lab Results   Component Value Date    MCV 91.9 01/14/2025    MCV 90 06/25/2024    MCV 84 04/16/2024     Lab Results   Component Value Date    TIBC 435 01/14/2025    TIBC 361 06/25/2024    TIBC 629 (H) 02/23/2024     Lab Results   Component Value Date    LABIRON 24 01/14/2025    LABIRON 19 06/25/2024    LABIRON 4 (L) 02/23/2024     Lab Results   Component Value Date    IRON 104 01/14/2025    IRON 26 (L) 02/23/2024       Lab Results   Component Value Date    [id number] 11/20/2023         CT Abdomen Pelvis With & Without Contrast    Result Date: 5/9/2024  Impression: 1. No evidence for obstructive uropathy or renal nephrolithiasis. Electronically Signed: Jocelyn Okeefe MD  5/9/2024 9:13 AM EDT  Workstation ID: [id number]      Assessment/Plan    1.  Iron deficiency anemia secondary to malabsorption.  She received IV iron infusion April 2024.  She has been doing well since with no recurring symptoms.  I reviewed her labs from today that are normal.  We will see her back in 6-month with repeat labs.  She will notify us in the interim if she develops any symptoms.    BENY Lou  [facility]       No orders of the defined types were placed in this encounter.      1/14/2025     Future Appointments         Provider Department Center    4/15/2025 3:15 PM (Arrive by 3:00 PM) Heather Saenz APRN [facility]  UROLOGY [place]    6/26/2025 1:45 PM Trace Naylor MD [facility] CARDIOLOGY [place]    7/15/2025 2:00 PM (Arrive by 1:45 PM) Airam Cuevas APRN [facility] HEMATOLOGY & ONCOLOGY [place]

## 2025-01-15 LAB
BACTERIA UR QL AUTO: NORMAL /HPF
BILIRUB UR QL STRIP: NEGATIVE
CLARITY UR: CLEAR
COLOR UR: YELLOW
GLUCOSE UR STRIP-MCNC: NEGATIVE MG/DL
HGB UR QL STRIP.AUTO: ABNORMAL
HYALINE CASTS UR QL AUTO: NORMAL /LPF
KETONES UR QL STRIP: NEGATIVE
LEUKOCYTE ESTERASE UR QL STRIP.AUTO: NEGATIVE
NITRITE UR QL STRIP: NEGATIVE
PH UR STRIP.AUTO: 6.5 [PH] (ref 5–8)
PROT UR QL STRIP: NEGATIVE
RBC # UR STRIP: NORMAL /HPF
REF LAB TEST METHOD: NORMAL
SP GR UR STRIP: 1.01 (ref 1–1.03)
SQUAMOUS #/AREA URNS HPF: NORMAL /HPF
UROBILINOGEN UR QL STRIP: ABNORMAL
WBC # UR STRIP: NORMAL /HPF

## 2025-01-16 LAB — BACTERIA SPEC AEROBE CULT: NO GROWTH

## 2025-01-17 DIAGNOSIS — K21.9 GASTROESOPHAGEAL REFLUX DISEASE, UNSPECIFIED WHETHER ESOPHAGITIS PRESENT: ICD-10-CM

## 2025-01-18 DIAGNOSIS — E11.65 UNCONTROLLED TYPE 2 DIABETES MELLITUS WITH HYPERGLYCEMIA: ICD-10-CM

## 2025-01-18 DIAGNOSIS — J30.9 ALLERGIC RHINITIS, UNSPECIFIED SEASONALITY, UNSPECIFIED TRIGGER: ICD-10-CM

## 2025-01-20 RX ORDER — MONTELUKAST SODIUM 10 MG/1
10 TABLET ORAL NIGHTLY
Qty: 90 TABLET | Refills: 0 | Status: SHIPPED | OUTPATIENT
Start: 2025-01-20

## 2025-01-27 RX ORDER — LOSARTAN POTASSIUM 25 MG/1
25 TABLET ORAL DAILY
Qty: 90 TABLET | Refills: 0 | OUTPATIENT
Start: 2025-01-27

## 2025-01-28 ENCOUNTER — OFFICE VISIT (OUTPATIENT)
Dept: FAMILY MEDICINE CLINIC | Facility: CLINIC | Age: 45
End: 2025-01-28
Payer: COMMERCIAL

## 2025-01-28 VITALS
OXYGEN SATURATION: 97 % | HEIGHT: 64 IN | BODY MASS INDEX: 42.51 KG/M2 | TEMPERATURE: 98.1 F | DIASTOLIC BLOOD PRESSURE: 80 MMHG | WEIGHT: 249 LBS | SYSTOLIC BLOOD PRESSURE: 130 MMHG | HEART RATE: 77 BPM

## 2025-01-28 DIAGNOSIS — H92.02 OTALGIA OF LEFT EAR: ICD-10-CM

## 2025-01-28 DIAGNOSIS — J34.89 SINUS PRESSURE: ICD-10-CM

## 2025-01-28 DIAGNOSIS — J01.10 ACUTE NON-RECURRENT FRONTAL SINUSITIS: ICD-10-CM

## 2025-01-28 DIAGNOSIS — M54.42 ACUTE MIDLINE LOW BACK PAIN WITH LEFT-SIDED SCIATICA: ICD-10-CM

## 2025-01-28 DIAGNOSIS — R30.0 DYSURIA: Primary | ICD-10-CM

## 2025-01-28 DIAGNOSIS — R42 DIZZINESS: ICD-10-CM

## 2025-01-28 LAB
BILIRUB BLD-MCNC: NEGATIVE MG/DL
CLARITY, POC: CLEAR
COLOR UR: YELLOW
EXPIRATION DATE: ABNORMAL
GLUCOSE UR STRIP-MCNC: NEGATIVE MG/DL
KETONES UR QL: NEGATIVE
LEUKOCYTE EST, POC: NEGATIVE
Lab: ABNORMAL
NITRITE UR-MCNC: NEGATIVE MG/ML
PH UR: 6.5 [PH] (ref 5–8)
PROT UR STRIP-MCNC: NEGATIVE MG/DL
RBC # UR STRIP: ABNORMAL /UL
SP GR UR: 1.01 (ref 1–1.03)
UROBILINOGEN UR QL: ABNORMAL

## 2025-01-28 PROCEDURE — 99214 OFFICE O/P EST MOD 30 MIN: CPT | Performed by: NURSE PRACTITIONER

## 2025-01-28 PROCEDURE — 81002 URINALYSIS NONAUTO W/O SCOPE: CPT | Performed by: NURSE PRACTITIONER

## 2025-01-28 RX ORDER — IBUPROFEN 800 MG/1
800 TABLET, FILM COATED ORAL EVERY 6 HOURS PRN
Qty: 30 TABLET | Refills: 1 | Status: SHIPPED | OUTPATIENT
Start: 2025-01-28

## 2025-01-28 RX ORDER — METHOCARBAMOL 500 MG/1
TABLET, FILM COATED ORAL
Qty: 20 TABLET | Refills: 0 | Status: SHIPPED | OUTPATIENT
Start: 2025-01-28

## 2025-01-28 RX ORDER — AZITHROMYCIN 250 MG/1
TABLET, FILM COATED ORAL
Qty: 6 TABLET | Refills: 0 | Status: SHIPPED | OUTPATIENT
Start: 2025-01-28

## 2025-01-28 NOTE — PROGRESS NOTES
Office Note     Name: Gina Tiwari    : 1980     MRN: [id number]     Chief Complaint  Dizziness (Pt states she has a sinus infection that's giving her vertigo ), Urinary Tract Infection (Pt is wanting urine checked to make sure her UTI has cleared ), and Back Pain (Pt states she has back pain that shoots down her leg )    Subjective     History of Present Illness:  Gina Tiwari is a 44 y.o. female who presents today for multiple concerns.     History of Present Illness  The patient presents for evaluation of urinary tract infection, back pain, and sinus infection.    She was recently diagnosed with a urinary tract infection (UTI) by her urologist on 2025. She is currently on a nightly regimen of Macrobid 100 mg as a prophylactic measure. She completed a course of Bactrim approximately 1.5 weeks ago. She reports experiencing irritation again and wants to make sure it is not a UTI. She has a history of recurrent UTIs, with previous episodes occurring while she was on a different preventative antibiotic. She also notes that her urine typically contains trace amounts of blood, but this increases to +2 or +3 during an infection. She has been advised to discontinue Macrobid if she starts a new antibiotic course. She has no known allergies to antibiotics.    She experienced a fall in early 2024, which resulted in back pain radiating down her left leg. She sought immediate care at an urgent care facility where she was prescribed methocarbamol and ibuprofen 800 mg. Concurrently, she began chiropractic treatment. An x-ray taken by the chiropractor revealed no fractures but identified significant misalignments. The chiropractor noted that one side of her hip is elevated, causing a compensatory shift in her upper body, and two of her vertebrae are slightly twisted. Due to this, she continues to experience pain when standing or walking, which manifests as a burning sensation along the  front of her left leg extending to the ankle. She also reports new onset of bilateral leg numbness when lying down at night, which is alleviated by drawing her legs up into a sitting position. She speculates that this could be due to a pinched nerve and questions the need for an MRI. Her daily activities, such as grocery shopping, are significantly impacted as she ends up hobbling and bending over by the end of the task. She has undergone dry needling and is considering whether gabapentin would be more effective than methocarbamol. She is unable to see her primary care physician until 2025. She has tried managing her pain with over-the-counter medications including ibuprofen, Tylenol, and arthritis strength Tylenol, but without success.     She suspects she may have a sinus infection causing dizziness, which has been ongoing for the past 3 to 4 days. She reports experiencing head pressure and pain, as well as intermittent pain in her left ear. She is uncertain if these symptoms are due to fluid accumulation. She also reports phlegm production and believes she may have bronchitis.       Objective     Past Medical History:   Diagnosis Date    Allergic     Anemia     Anxiety state 2010    Anxiety syndrome     Asthma     Bell's palsy     twice    Carpal tunnel syndrome     Chronic bronchitis     Clotting disorder     CTS (carpal tunnel syndrome)     Depression     Diabetes mellitus     Dizziness     Fibroid     Fracture     GERD (gastroesophageal reflux disease) 2010    Headache, tension-type     HL (hearing loss)     Hyperlipidemia 2013    Hypertension     Hypothyroidism 2013    Iron deficiency anemia secondary to inadequate dietary iron intake 2023    Kidney infection     Memory loss     Memory loss     Obesity     Preeclampsia 2004        Shingles     Sleep apnea 05 12 12    Thyroid disease     Trigeminal neuralgia     Upper respiratory infection 2018    Urinary  "incontinence     Vaginal infection     Weakness      Past Surgical History:   Procedure Laterality Date    BLADDER SURGERY  2024    Scope    CARPAL TUNNEL RELEASE Left 2015    CARPAL TUNNEL RELEASE Right 08/15/2008     SECTION      X 2,  and     COLONOSCOPY N/A 2024    Procedure: COLONOSCOPY;  Surgeon: Ronald Reyes MD;  Location:  [facility];  Service: Gastroenterology;  Laterality: N/A;    COLONOSCOPY  2024    ENDOMETRIAL ABLATION      ENDOSCOPY N/A 2024    Procedure: ESOPHAGOGASTRODUODENOSCOPY;  Surgeon: Ronald Reyes MD;  Location:  [facility];  Service: Gastroenterology;  Laterality: N/A;    HERNIA REPAIR      incisional on abdomen    HYSTEROSCOPY ENDOMETRIAL ABLATION TUBAL STERILIZATION      LAPAROSCOPIC TUBAL LIGATION      TUBAL ABDOMINAL LIGATION       Family History   Problem Relation Age of Onset    Hypertension Mother     Coronary artery disease Mother     Heart disease Mother     High cholesterol Mother     Asthma Mother     COPD Mother     High cholesterol Father     Heart disease Father     Diabetes Father     Coronary artery disease Father     Kidney failure Father     Hypertension Father     Hyperlipidemia Father     Diabetes Sister     Lymphoma Maternal Grandmother     Diabetes Maternal Grandmother     Obesity Maternal Grandmother     Diabetes Paternal Grandmother        Vital Signs  /80 (BP Location: Left arm, Patient Position: Sitting)   Pulse 77   Temp 98.1 °F (36.7 °C) (Oral)   Ht 162.6 cm (64\")   Wt 113 kg (249 lb)   SpO2 97%   BMI 42.74 kg/m²   Estimated body mass index is 42.74 kg/m² as calculated from the following:    Height as of this encounter: 162.6 cm (64\").    Weight as of this encounter: 113 kg (249 lb).    Physical Exam  Vitals reviewed.   Constitutional:       Appearance: Normal appearance.   HENT:      Head: Normocephalic.      Right Ear: Tympanic membrane, ear canal and external ear " normal.      Left Ear: Tympanic membrane, ear canal and external ear normal.      Nose: Congestion present.      Right Sinus: Frontal sinus tenderness present. No maxillary sinus tenderness.      Left Sinus: Frontal sinus tenderness present. No maxillary sinus tenderness.      Mouth/Throat:      Mouth: Mucous membranes are moist.      Pharynx: Oropharynx is clear.   Cardiovascular:      Rate and Rhythm: Normal rate and regular rhythm.      Heart sounds: Normal heart sounds. No murmur heard.  Pulmonary:      Effort: Pulmonary effort is normal. No respiratory distress.      Breath sounds: Normal breath sounds. No stridor. No wheezing, rhonchi or rales.   Skin:     General: Skin is warm and dry.   Neurological:      General: No focal deficit present.      Mental Status: She is alert and oriented to person, place, and time.   Psychiatric:         Mood and Affect: Mood normal.         Behavior: Behavior normal.        Physical Exam  Lungs are clear.    Results  Laboratory Studies  Urine culture on January 16 was negative for UTI. Trace of blood found in urine.    POCT Results (if applicable):  Results for orders placed or performed in visit on 01/28/25   POC Urinalysis Dipstick    Collection Time: 01/28/25  4:30 PM    Specimen: Urine   Result Value Ref Range    Color Yellow Yellow, Straw, Dark Yellow, Citlali    Clarity, UA Clear Clear    Glucose, UA Negative Negative mg/dL    Bilirubin Negative Negative    Ketones, UA Negative Negative    Specific Gravity  1.015 1.005 - 1.030    Blood, UA Trace (A) Negative    pH, Urine 6.5 5.0 - 8.0    Protein, POC Negative Negative mg/dL    Urobilinogen, UA 0.2 E.U./dL Normal, 0.2 E.U./dL    Leukocytes Negative Negative    Nitrite, UA Negative Negative    Lot Number [id number]     Expiration Date 09/30/2025             Assessment and Plan     Diagnoses and all orders for this visit:    1. Dysuria (Primary)  -     POC Urinalysis Dipstick  -     Urine Culture - Urine, Urine, Clean  Catch    2. Acute midline low back pain with left-sided sciatica  -     MRI Lumbar Spine Without Contrast; Future  -     methocarbamol (ROBAXIN) 500 MG tablet; Take one tablet by oral route twice a day, as needed  Dispense: 20 tablet; Refill: 0  -     ibuprofen (ADVIL,MOTRIN) 800 MG tablet; Take 1 tablet by mouth Every 6 (Six) Hours As Needed for Mild Pain.  Dispense: 30 tablet; Refill: 1    3. Dizziness    4. Sinus pressure    5. Otalgia of left ear    6. Acute non-recurrent frontal sinusitis  -     azithromycin (Zithromax Z-Guicho) 250 MG tablet; Take 2 tablets by mouth on day 1, then 1 tablet daily on days 2-5  Dispense: 6 tablet; Refill: 0      Assessment & Plan  1. Urinary Tract Infection (UTI).  A urine culture conducted on 01/16/2025 yielded negative results for UTI. However, she reports experiencing irritation again. A urine culture will be sent for further analysis. She is advised to continue her nightly regimen of Macrobid 100 mg as a preventative measure.    2. Back Pain.  She reports back pain radiating down her left leg, which began after a fall in early December. The pain improves when sitting but worsens with standing or walking. She also experiences numbness in both legs when lying down. An x-ray taken by her chiropractor showed no fractures but indicated bad alignments. An MRI has been ordered to further investigate the cause of her symptoms. She is advised to continue taking ibuprofen and methocarbamol as needed for pain management. Prescriptions for ib[facility]fen [facility]    3. Sinus Infection.  She reports dizziness, head pressure, and pain, along with random pains in her left ear, which have persisted for about three to four days. A prescription for Zithromax has been provided to address the sinus infection. She is advised to continue taking Macrobid as it does not interfere with the prescribed antibiotic.        Follow Up  Return if symptoms worsen or fail to  improve.      Patient or patient representative verbalized consent for the use of Ambient Listening during the visit with  BENY Shen for chart documentation. 1/28/2025  16:12 EST    BENY Shen

## 2025-01-30 LAB
BACTERIA UR CULT: NORMAL
BACTERIA UR CULT: NORMAL

## 2025-02-03 DIAGNOSIS — D50.9 IRON DEFICIENCY ANEMIA, UNSPECIFIED IRON DEFICIENCY ANEMIA TYPE: ICD-10-CM

## 2025-02-03 RX ORDER — FERROUS GLUCONATE 324(38)MG
1 TABLET ORAL
Qty: 30 TABLET | Refills: 0 | Status: SHIPPED | OUTPATIENT
Start: 2025-02-03

## 2025-02-12 ENCOUNTER — HOSPITAL ENCOUNTER (OUTPATIENT)
Dept: MRI IMAGING | Facility: HOSPITAL | Age: 45
Discharge: HOME | End: 2025-02-12
Admitting: NURSE PRACTITIONER
Payer: COMMERCIAL

## 2025-02-12 DIAGNOSIS — M54.42 ACUTE MIDLINE LOW BACK PAIN WITH LEFT-SIDED SCIATICA: ICD-10-CM

## 2025-02-12 PROCEDURE — 72148 MRI LUMBAR SPINE W/O DYE: CPT

## 2025-02-18 ENCOUNTER — TELEPHONE (OUTPATIENT)
Dept: FAMILY MEDICINE CLINIC | Facility: CLINIC | Age: 45
End: 2025-02-18
Payer: COMMERCIAL

## 2025-02-18 DIAGNOSIS — M54.42 ACUTE MIDLINE LOW BACK PAIN WITH LEFT-SIDED SCIATICA: Primary | ICD-10-CM

## 2025-02-18 NOTE — TELEPHONE ENCOUNTER
"Patient informed and voiced understanding.        Patient would like to see Neurosurgery.    ----- Message from Charito Clark sent at 2/18/2025  9:38 AM EST -----  Patient has not seen [employer] result message. Please reach out to them. Thank you!      We got your results back for the MRI.  The radiologist does note that you have \"mild\" degenerative changes in your low back.  At this point, we can move forward with one of a few options, just let me know which that you would like to do:     1.  Neurosurgery referral to discuss possible treatment options moving forward  2.  Physical therapy referral  3.  Continue with current ibuprofen and other at-home interventions     It is completely your choice!  I am happy to help facilitate which ever you would like to do.  "

## 2025-02-19 NOTE — TELEPHONE ENCOUNTER
"I am not a neurosurgeon and it is not my job to interpret the MRI - that is absolutely above my scope of practice and I could potentially be giving her incorrect information, which could scare her and induce unnecessary worry, etc.     It IS my job to tell her what the radiologist's interpretation of her MRI is, it IS my job to take [id number] medical terminology and make it easier for her to understand, it IS my job to present her with options for her care moving forward, which is what I did.     The \"bulging disc\" that she is referring to is part of what the radiologist described as \"mild degenerative change\". She chose to go see the neurosurgeon, so I would suggest she discuss it further with the neurosurgeon. They will be able to review her scans and comment on what exactly that means and how she is specifically affected by this.   "

## 2025-02-19 NOTE — TELEPHONE ENCOUNTER
Pt informed.     She states she read the report and she wants to know why the bulging disc in her back was not addressed?

## 2025-02-25 DIAGNOSIS — E03.9 HYPOTHYROIDISM (ACQUIRED): ICD-10-CM

## 2025-02-25 RX ORDER — LEVOTHYROXINE SODIUM 50 UG/1
50 TABLET ORAL DAILY
Qty: 60 TABLET | Refills: 0 | Status: SHIPPED | OUTPATIENT
Start: 2025-02-25

## 2025-02-25 NOTE — TELEPHONE ENCOUNTER
Caller: Gina Loomis    Relationship: Self    Best call back number:  [phone]     Requested Prescriptions:   Requested Prescriptions     Pending Prescriptions Disp Refills    levothyroxine (SYNTHROID, LEVOTHROID) 50 MCG tablet [Pharmacy Med Name: Levothyroxine Sodium 50 MCG Oral Tablet] 90 tablet 0     Sig: Take 1 tablet by mouth once daily        Pharmacy where request should be sent: [facility] [address] [place] [address] [phone] [facility] [phone]      Last office visit with prescribing clinician: 11/14/2024   Last telemedicine visit with prescribing clinician: Visit date not found   Next office visit with prescribing clinician: 3/27/2025     Additional details provided by patient: 2 PILLS LEFT.    Does the patient have less than a 3 day supply:  [x] Yes  [] No    Would you like a call back once the refill request has been completed: [] Yes [x] No    If the office needs to give you a call back, can they leave a voicemail: [] Yes [x] No    Galina Salomon Rep   02/25/25 14:21 EST

## 2025-03-01 DIAGNOSIS — D50.9 IRON DEFICIENCY ANEMIA, UNSPECIFIED IRON DEFICIENCY ANEMIA TYPE: ICD-10-CM

## 2025-03-03 RX ORDER — FERROUS GLUCONATE 324(38)MG
1 TABLET ORAL
Qty: 30 TABLET | Refills: 0 | Status: SHIPPED | OUTPATIENT
Start: 2025-03-03

## 2025-03-13 DIAGNOSIS — I10 ESSENTIAL HYPERTENSION: ICD-10-CM

## 2025-03-13 DIAGNOSIS — R07.89 CHEST PRESSURE: ICD-10-CM

## 2025-03-13 RX ORDER — CARVEDILOL 12.5 MG/1
12.5 TABLET ORAL 2 TIMES DAILY
Qty: 180 TABLET | Refills: 3 | Status: SHIPPED | OUTPATIENT
Start: 2025-03-13 | End: 2026-03-13

## 2025-03-24 ENCOUNTER — OFFICE VISIT (OUTPATIENT)
Dept: NEUROSURGERY | Facility: CLINIC | Age: 45
End: 2025-03-24
Payer: COMMERCIAL

## 2025-03-24 VITALS — TEMPERATURE: 97.3 F | HEIGHT: 64 IN | BODY MASS INDEX: 49.68 KG/M2 | WEIGHT: 291 LBS

## 2025-03-24 DIAGNOSIS — F41.9 ANXIETY AND DEPRESSION: ICD-10-CM

## 2025-03-24 DIAGNOSIS — M54.50 CHRONIC BILATERAL LOW BACK PAIN WITHOUT SCIATICA: Primary | ICD-10-CM

## 2025-03-24 DIAGNOSIS — M51.362 DEGENERATION OF INTERVERTEBRAL DISC OF LUMBAR REGION WITH DISCOGENIC BACK PAIN AND LOWER EXTREMITY PAIN: ICD-10-CM

## 2025-03-24 DIAGNOSIS — M25.552 LEFT HIP PAIN: ICD-10-CM

## 2025-03-24 DIAGNOSIS — G89.29 CHRONIC BILATERAL LOW BACK PAIN WITHOUT SCIATICA: Primary | ICD-10-CM

## 2025-03-24 DIAGNOSIS — F32.A ANXIETY AND DEPRESSION: ICD-10-CM

## 2025-03-24 PROCEDURE — 99204 OFFICE O/P NEW MOD 45 MIN: CPT | Performed by: NEUROLOGICAL SURGERY

## 2025-03-24 NOTE — PROGRESS NOTES
NAME: RACHEL HOYOS   DOS: 3/24/2025  : 1980  PCP: Daniel Stratton MD    Chief Complaint:    Chief Complaint   Patient presents with    Back Pain    Leg Pain     Left Thigh Pain       History of Present Illness:  45 y.o. female   I saw this 45-year-old female who presents with a history of chronic axial back pain she reports that it was made worse after a fall she has been able to shake the pain is located in the left SI joint occasionally radiates down the gluteal area but is not associated with flagrant sciatica she has midline back pain she denies cauda equina syndrome she is tried some PT she is a daily smoker and has a history of anxiety    PMHX  Allergies:  Allergies   Allergen Reactions    Lisinopril Cough    Claritin-D 24 Hour [Loratadine-Pseudoephedrine Er] Other (See Comments)     Couldn't sleep    Loratadine Anxiety    Morphine Itching    Pseudoephedrine Anxiety     Medications    Current Outpatient Medications:     albuterol sulfate  (90 Base) MCG/ACT inhaler, Inhale 2 puffs Every 4 (Four) Hours As Needed for Wheezing., Disp: 18 g, Rfl: 5    azithromycin (Zithromax Z-Guicho) 250 MG tablet, Take 2 tablets by mouth on day 1, then 1 tablet daily on days 2-5, Disp: 6 tablet, Rfl: 0    benzonatate (TESSALON) 200 MG capsule, Take 1 capsule by mouth 2 (Two) Times a Day As Needed., Disp: , Rfl:     carvedilol (COREG) 12.5 MG tablet, Take 1 tablet by mouth 2 (Two) Times a Day., Disp: 180 tablet, Rfl: 3    clonazePAM (KlonoPIN) 1 MG tablet, Take 1 tablet by mouth 2 (Two) Times a Day As Needed for Anxiety (with flying)., Disp: 10 tablet, Rfl: 0    ferrous gluconate (FERGON) 324 MG tablet, Take 1 tablet by mouth once daily with breakfast, Disp: 30 tablet, Rfl: 0    FLUoxetine (PROzac) 20 MG capsule, TAKE 3 CAPSULES BY MOUTH ONCE DAILY, Disp: 90 capsule, Rfl: 2    hydrOXYzine (ATARAX) 25 MG tablet, Take 1 tablet by mouth As Needed., Disp: , Rfl:     ibuprofen (ADVIL,MOTRIN) 800 MG tablet, Take  1 tablet by mouth Every 6 (Six) Hours As Needed for Mild Pain., Disp: 30 tablet, Rfl: 1    levothyroxine (SYNTHROID, LEVOTHROID) 50 MCG tablet, Take 1 tablet by mouth once daily, Disp: 60 tablet, Rfl: 0    losartan (COZAAR) 50 MG tablet, Take 1 tablet by mouth Daily., Disp: 90 tablet, Rfl: 1    meclizine (ANTIVERT) 25 MG tablet, Take 0.5 tablets by mouth 3 (Three) Times a Day As Needed for Dizziness., Disp: , Rfl:     metFORMIN (GLUCOPHAGE) 1000 MG tablet, TAKE 1 TABLET BY MOUTH TWICE DAILY WITH MEALS, Disp: 180 tablet, Rfl: 0    methocarbamol (ROBAXIN) 500 MG tablet, Take one tablet by oral route twice a day, as needed, Disp: 20 tablet, Rfl: 0    montelukast (SINGULAIR) 10 MG tablet, TAKE 1 TABLET BY MOUTH ONCE DAILY AT NIGHT, Disp: 90 tablet, Rfl: 0    NIFEdipine XL (PROCARDIA XL) 60 MG 24 hr tablet, Take 1 tablet by mouth Daily., Disp: 90 tablet, Rfl: 1    nitrofurantoin, macrocrystal-monohydrate, (Macrobid) 100 MG capsule, Take 1 capsule by mouth Every Night., Disp: 90 capsule, Rfl: 1    nitroglycerin (NITROSTAT) 0.4 MG SL tablet, 1 under the tongue as needed for angina, may repeat every 5 mins for up three doses.  If no resolution of chest pain, call 911 or head to the nearest emergency room., Disp: 100 tablet, Rfl: 11    norethindrone (AYGESTIN) 5 MG tablet, Take 1 tablet by mouth Daily., Disp: 30 tablet, Rfl: 0    omega-3 acid ethyl esters (LOVAZA) 1 g capsule, Take 2 capsules by mouth 2 (Two) Times a Day., Disp: 360 capsule, Rfl: 3    omeprazole (priLOSEC) 20 MG capsule, Take 1 capsule by mouth twice daily, Disp: 60 capsule, Rfl: 5    ondansetron ODT (ZOFRAN-ODT) 4 MG disintegrating tablet, 1 tablet Every 8 (Eight) Hours As Needed for Nausea or Vomiting., Disp: , Rfl:     rosuvastatin (CRESTOR) 10 MG tablet, Take 1 tablet by mouth Every Night., Disp: 90 tablet, Rfl: 3    solifenacin (VESICARE) 10 MG tablet, Take 1 tablet by mouth Daily., Disp: 60 tablet, Rfl: 1    triamterene-hydrochlorothiazide (MAXZIDE-25)  37.5-25 MG per tablet, Take 1 tablet by mouth once daily, Disp: 30 tablet, Rfl: 2  Past Medical History:  Past Medical History:   Diagnosis Date    Allergic     Anemia     Anxiety state 2010    Anxiety syndrome     Arthritis 2 4 2017    Asthma     Bell's palsy     twice    Carpal tunnel syndrome     Cervical disc disorder 9 10     Chronic bronchitis     Clotting disorder     CTS (carpal tunnel syndrome)     Depression     Diabetes mellitus     Dizziness     Fibroid     Fracture     GERD (gastroesophageal reflux disease) 2010    Headache, tension-type     HL (hearing loss)     Hyperlipidemia 2013    Hypertension     Hypothyroidism 2013    Iron deficiency anemia secondary to inadequate dietary iron intake 2023    Kidney infection     Low back pain 12 3     Lumbosacral disc disease 9 10     Memory loss     Memory loss     Obesity     Preeclampsia 2004        Shingles     Sleep apnea 05 12 12    Thoracic disc disorder 9 10     Thyroid disease     Trigeminal neuralgia     Upper respiratory infection 2018    Urinary incontinence     Vaginal infection     Weakness      Past Surgical History:  Past Surgical History:   Procedure Laterality Date    BLADDER SURGERY  2024    Scope    CARPAL TUNNEL RELEASE Left 2015    CARPAL TUNNEL RELEASE Right 08/15/2008     SECTION      X 2,  and     COLONOSCOPY N/A 2024    Procedure: COLONOSCOPY;  Surgeon: Ronald Reyes MD;  Location:  [facility];  Service: Gastroenterology;  Laterality: N/A;    COLONOSCOPY  2024    ENDOMETRIAL ABLATION      ENDOSCOPY N/A 2024    Procedure: ESOPHAGOGASTRODUODENOSCOPY;  Surgeon: Ronald Reyes MD;  Location:  [facility];  Service: Gastroenterology;  Laterality: N/A;    HERNIA REPAIR      incisional on abdomen    HYSTEROSCOPY ENDOMETRIAL ABLATION TUBAL STERILIZATION      LAPAROSCOPIC TUBAL LIGATION      TUBAL ABDOMINAL  LIGATION       Social Hx:  Social History     Tobacco Use    Smoking status: Every Day     Current packs/day: 1.00     Average packs/day: 1.1 packs/day for 42.9 years (48.3 ttl pk-yrs)     Types: Cigarettes     Start date: 1/1/1993     Passive exposure: Current    Smokeless tobacco: Never   Vaping Use    Vaping status: Never Used   Substance Use Topics    Alcohol use: Not Currently    Drug use: Never     Family Hx:  Family History   Problem Relation Age of Onset    Hypertension Mother     Coronary artery disease Mother     Heart disease Mother     High cholesterol Mother     Asthma Mother     COPD Mother     High cholesterol Father     Heart disease Father     Diabetes Father     Coronary artery disease Father     Kidney failure Father     Hypertension Father     Hyperlipidemia Father     Diabetes Sister     Lymphoma Maternal Grandmother     Diabetes Maternal Grandmother     Obesity Maternal Grandmother     Diabetes Paternal Grandmother      Review of Systems:        Review of Systems   Constitutional:  Negative for activity change, appetite change, chills, diaphoresis, fatigue, fever and unexpected weight change.   HENT:  Negative for congestion, dental problem, drooling, ear discharge, ear pain, facial swelling, hearing loss, mouth sores, nosebleeds, postnasal drip, rhinorrhea, sinus pressure, sinus pain, sneezing, sore throat, tinnitus, trouble swallowing and voice change.    Eyes:  Negative for photophobia, pain, discharge, redness, itching and visual disturbance.   Respiratory:  Negative for apnea, cough, choking, chest tightness, shortness of breath, wheezing and stridor.    Cardiovascular:  Negative for chest pain, palpitations and leg swelling.   Gastrointestinal:  Negative for abdominal distention, abdominal pain, anal bleeding, blood in stool, constipation, diarrhea, nausea, rectal pain and vomiting.   Endocrine: Negative for cold intolerance, heat intolerance, polydipsia, polyphagia and polyuria.    Genitourinary:  Negative for decreased urine volume, difficulty urinating, dyspareunia, dysuria, enuresis, flank pain, frequency, genital sores, hematuria, menstrual problem, pelvic pain, urgency, vaginal bleeding, vaginal discharge and vaginal pain.   Musculoskeletal:  Positive for back pain and gait problem. Negative for arthralgias, joint swelling, myalgias, neck pain and neck stiffness.   Skin:  Negative for color change, pallor, rash and wound.   Allergic/Immunologic: Negative for environmental allergies, food allergies and immunocompromised state.   Neurological:  Positive for numbness. Negative for dizziness, tremors, seizures, syncope, facial asymmetry, speech difficulty, weakness, light-headedness and headaches.   Hematological:  Negative for adenopathy. Does not bruise/bleed easily.   Psychiatric/Behavioral:  Negative for agitation, behavioral problems, confusion, decreased concentration, dysphoric mood, hallucinations, self-injury, sleep disturbance and suicidal ideas. The patient is not nervous/anxious and is not hyperactive.       I have reviewed this note template and all pertinent parts of the review of systems social, family history, surgical history and medication list    Physical Examination:  Vitals:    03/24/25 1023   Temp: 97.3 °F (36.3 °C)      General Appearance:   Well developed, well nourished, well groomed, alert, and cooperative.  Neurological examination:  Neurological Exam   Vital signs were reviewed and documented in the chart  Patient appeared in good neurologic function with normal comprehension fluent speech  Mood and affect are normal  Cranial nerves are grossly intact  Muscle bulk and tone normal  5 out of 5 strength no motor drift  Gait normal intact  Negative Romberg  No clonus long tract signs or myelopathy    Reflexes symmetrically trace  2+ edema noted and extremities skin appears normal    Straight leg raise sign absent  No signs of intrinsic hip dysfunction on the right  some on the left  Back is without any lesions or abnormality  Feet are warm and well perfused        Review of Imaging/DATA:  I personally reviewed and interpreted a MRI of the lumbar spine she is got some stress response at 4 5 with a centralized disc herniation she may be working on an occult mild spondylolisthesis she has mild degrees of facet arthropathy    The central canal is widely patent she does have a centralized disc herniation at the 4 5 area  Diagnoses/Plan:    Ms. Loomis is a 45 y.o. female   1.  Chronic axial back pain secondary likely to arthritis and deconditioning    2.  Centralized disc herniation and/or annular tear with stress response L4-5 could be brewing slight spondylolisthesis it certainly not evident on the scan resultant left-sided sciatic leg pain possible    3.  Deconditioning    4.  Obesity BMI is approximately 50    5.  Smoking history    I explained the risk benefits and expected outcome of major elective surgery for their problem, complications from approach, and infection, the risk of neurologic implications after surgery as well as need for repeat surgeries and most importantly failure to achieve quality of life improvement from the surgery to the patient.    I explained the importance of smoking cessation to the patient explaining that smoking decreases the efficacy of surgical therapies not to mention the general health risks.  In addition to this when contemplating fusion surgeries smoking decreases fusion rates and leads to poor outcome, and contributes to complication rate.    I talked her about the treatment pathway from my standpoint I think it is reasonable to see her back with a lumbar flexion-extension film    Will make a referral to interventional pain management    She is to follow-up with her primary care doctor regarding a smoking cessation plan    I stressed the importance of nutrition, exercise, sleep, and generalized health and weight loss given her relatively  young age she seemed to express understanding

## 2025-03-27 ENCOUNTER — OFFICE VISIT (OUTPATIENT)
Dept: FAMILY MEDICINE CLINIC | Facility: CLINIC | Age: 45
End: 2025-03-27
Payer: COMMERCIAL

## 2025-03-27 VITALS
HEART RATE: 78 BPM | SYSTOLIC BLOOD PRESSURE: 130 MMHG | WEIGHT: 292 LBS | DIASTOLIC BLOOD PRESSURE: 80 MMHG | BODY MASS INDEX: 49.85 KG/M2 | HEIGHT: 64 IN

## 2025-03-27 DIAGNOSIS — E11.65 UNCONTROLLED TYPE 2 DIABETES MELLITUS WITH HYPERGLYCEMIA: ICD-10-CM

## 2025-03-27 DIAGNOSIS — M54.42 ACUTE MIDLINE LOW BACK PAIN WITH LEFT-SIDED SCIATICA: ICD-10-CM

## 2025-03-27 DIAGNOSIS — E03.9 HYPOTHYROIDISM (ACQUIRED): ICD-10-CM

## 2025-03-27 DIAGNOSIS — F32.A ANXIETY AND DEPRESSION: ICD-10-CM

## 2025-03-27 DIAGNOSIS — J43.9 PULMONARY EMPHYSEMA, UNSPECIFIED EMPHYSEMA TYPE: ICD-10-CM

## 2025-03-27 DIAGNOSIS — F41.9 ANXIETY AND DEPRESSION: ICD-10-CM

## 2025-03-27 DIAGNOSIS — K21.9 GASTROESOPHAGEAL REFLUX DISEASE, UNSPECIFIED WHETHER ESOPHAGITIS PRESENT: ICD-10-CM

## 2025-03-27 DIAGNOSIS — J30.9 ALLERGIC RHINITIS, UNSPECIFIED SEASONALITY, UNSPECIFIED TRIGGER: ICD-10-CM

## 2025-03-27 DIAGNOSIS — E11.9 TYPE 2 DIABETES MELLITUS WITHOUT COMPLICATION, WITHOUT LONG-TERM CURRENT USE OF INSULIN: ICD-10-CM

## 2025-03-27 DIAGNOSIS — I10 ESSENTIAL HYPERTENSION: ICD-10-CM

## 2025-03-27 DIAGNOSIS — Z00.00 ROUTINE GENERAL MEDICAL EXAMINATION AT A HEALTH CARE FACILITY: Primary | ICD-10-CM

## 2025-03-27 DIAGNOSIS — D64.9 ANEMIA, UNSPECIFIED TYPE: ICD-10-CM

## 2025-03-27 RX ORDER — OMEPRAZOLE 20 MG/1
20 CAPSULE, DELAYED RELEASE ORAL 2 TIMES DAILY
Qty: 180 CAPSULE | Refills: 1 | Status: SHIPPED | OUTPATIENT
Start: 2025-03-27

## 2025-03-27 RX ORDER — MONTELUKAST SODIUM 10 MG/1
10 TABLET ORAL NIGHTLY
Qty: 90 TABLET | Refills: 1 | Status: SHIPPED | OUTPATIENT
Start: 2025-03-27

## 2025-03-27 RX ORDER — TRIAMTERENE AND HYDROCHLOROTHIAZIDE 37.5; 25 MG/1; MG/1
1 TABLET ORAL DAILY
Qty: 90 TABLET | Refills: 1 | Status: SHIPPED | OUTPATIENT
Start: 2025-03-27

## 2025-03-27 RX ORDER — LEVOTHYROXINE SODIUM 50 UG/1
50 TABLET ORAL DAILY
Qty: 90 TABLET | Refills: 1 | Status: SHIPPED | OUTPATIENT
Start: 2025-03-27

## 2025-03-27 RX ORDER — MECLIZINE HYDROCHLORIDE 25 MG/1
12.5 TABLET ORAL 3 TIMES DAILY PRN
Qty: 30 TABLET | Refills: 2 | Status: SHIPPED | OUTPATIENT
Start: 2025-03-27

## 2025-03-27 RX ORDER — METHOCARBAMOL 500 MG/1
TABLET, FILM COATED ORAL
Qty: 40 TABLET | Refills: 0 | Status: SHIPPED | OUTPATIENT
Start: 2025-03-27

## 2025-03-27 RX ORDER — ALBUTEROL SULFATE 90 UG/1
2 INHALANT RESPIRATORY (INHALATION) EVERY 4 HOURS PRN
Qty: 18 G | Refills: 5 | Status: SHIPPED | OUTPATIENT
Start: 2025-03-27

## 2025-03-27 RX ORDER — IBUPROFEN 800 MG/1
800 TABLET, FILM COATED ORAL EVERY 6 HOURS PRN
Qty: 30 TABLET | Refills: 1 | Status: SHIPPED | OUTPATIENT
Start: 2025-03-27

## 2025-03-27 NOTE — PROGRESS NOTES
Female Physical Note      Date: 2025   Patient Name: Gina Loomis  : 1980   MRN: [id number]     Chief Complaint:    Chief Complaint   Patient presents with    Annual Exam       History of Present Illness: Gina Loomis is a 45 y.o. female who is here today for their annual health maintenance and physical.   History of Present Illness  The patient presents for a checkup.    She has been experiencing severe back pain, which she attributes to a fall on 2024. The pain intensifies with walking but is manageable when standing. She consulted a neurosurgeon on Monday, who suspected a torn disc and stress fracture. A referral to a pain clinic and physical therapy was suggested, with an estimated recovery period of 6 months to a year. She has received a call from the physical therapy department but not from the pain clinic. Her supply of ibuprofen and methocarbamol is depleting, and she is seeking a refill. She is also considering a medication change to address potential nerve pain. She is hesitant to start physical therapy without adequate pain management.    She is currently taking omeprazole twice daily for heartburn and requires a refill of her albuterol prescription. She is on carvedilol and a diuretic. She is on birth control pills to manage menstrual bleeding and has been advised to discontinue them for 3 months to assess menopausal status. She underwent an ablation procedure, which was unsuccessful in stopping the bleeding. She is on Singulair for allergies and requires a refill of her metformin prescription. She is on fluoxetine 60 mg daily, prescribed as three 20 mg tablets. She is also on iron supplements and experiences pica symptoms when not taking them. She was severely anemic last year and underwent a scope and colonoscopy to identify the source of blood loss. She was subsequently referred to a hematologist and started on iron infusions, which improved her condition. She  was advised to continue the iron supplements.    She experienced panic attacks during air travel, which have since improved. Her pulmonologist suggested that her oxygenation levels may have dropped due to pressure changes during flight. Her lung function is currently at 100 percent. She has sleep apnea and uses a CPAP machine. She is experiencing fatigue and suspects it may be related to depression following her children's departure from home. She has not refilled her hydroxyzine prescription.    She has had issues with UTIs but was told her kidney function was good. To prevent UTIs, she was put on nitrofurantoin 100 mg. She also takes Vesicare.    She had a Pap smear done this year and believes she had a mammogram this year as well. She saw the eye doctor last year, who told her that her eyesight had improved. She has only taken one COVID-19 vaccine and contracted COVID-19 four days later.    MEDICATIONS  Current: ibuprofen, methocarbamol, omeprazole, albuterol, carvedilol, losartan, Singulair, metformin, fluoxetine, iron, nitrofurantoin, Vesicare    IMMUNIZATIONS  She completed a series for hepatitis a few years ago.  She is up to date on her tetanus shot.  She has received one dose of the COVID-19 vaccine.    Subjective      Review of Systems:   Review of Systems    Past Medical History:   Past Medical History:   Diagnosis Date    Allergic     Anemia     Anxiety state 02/05/2010    Anxiety syndrome     Arthritis 2 4 2017    Asthma     Bell's palsy     twice    Carpal tunnel syndrome     Cervical disc disorder 9 10 2011    Chronic bronchitis     Clotting disorder     CTS (carpal tunnel syndrome)     Depression     Diabetes mellitus     Dizziness     Fibroid     Fracture     GERD (gastroesophageal reflux disease) 02/05/2010    Headache, tension-type     HL (hearing loss)     Hyperlipidemia 01/31/2013    Hypertension     Hypothyroidism 01/31/2013    Iron deficiency anemia secondary to inadequate dietary iron intake  2023    Kidney infection     Low back pain 12 3 2025    Lumbosacral disc disease 9 10 2010    Memory loss     Memory loss     Obesity     Preeclampsia 2004        Shingles     Sleep apnea 05 12 12    Thoracic disc disorder 9 10     Thyroid disease     Trigeminal neuralgia     Upper respiratory infection 2018    Urinary incontinence     Vaginal infection     Weakness        Past Surgical History:   Past Surgical History:   Procedure Laterality Date    BLADDER SURGERY  2024    Scope    CARPAL TUNNEL RELEASE Left 2015    CARPAL TUNNEL RELEASE Right 08/15/2008     SECTION      X 2, 2004 and     COLONOSCOPY N/A 2024    Procedure: COLONOSCOPY;  Surgeon: Ronald Reyes MD;  Location:  [facility];  Service: Gastroenterology;  Laterality: N/A;    COLONOSCOPY  2024    ENDOMETRIAL ABLATION      ENDOSCOPY N/A 2024    Procedure: ESOPHAGOGASTRODUODENOSCOPY;  Surgeon: Ronald Reyes MD;  Location: [email or web address];  Service: Gastroenterology;  Laterality: N/A;    HERNIA REPAIR      incisional on abdomen    HYSTEROSCOPY ENDOMETRIAL ABLATION TUBAL STERILIZATION      LAPAROSCOPIC TUBAL LIGATION  2020    TUBAL ABDOMINAL LIGATION         Family History:   Family History   Problem Relation Age of Onset    Hypertension Mother     Coronary artery disease Mother     Heart disease Mother     High cholesterol Mother     Asthma Mother     COPD Mother     High cholesterol Father     Heart disease Father     Diabetes Father     Coronary artery disease Father     Kidney failure Father     Hypertension Father     Hyperlipidemia Father     Diabetes Sister     Lymphoma Maternal Grandmother     Diabetes Maternal Grandmother     Obesity Maternal Grandmother     Diabetes Paternal Grandmother        Social History:   Social History     Socioeconomic History    Marital status:    Tobacco Use    Smoking status: Every Day     Current packs/day: 1.00      Average packs/day: 1.1 packs/day for 42.9 years (48.3 ttl pk-yrs)     Types: Cigarettes     Start date: 1/1/1993     Passive exposure: Current    Smokeless tobacco: Never   Vaping Use    Vaping status: Never Used   Substance and Sexual Activity    Alcohol use: Not Currently    Drug use: Never    Sexual activity: Yes     Partners: Male     Birth control/protection: Tubal ligation       Medications:     Current Outpatient Medications:     albuterol sulfate  (90 Base) MCG/ACT inhaler, Inhale 2 puffs Every 4 (Four) Hours As Needed for Wheezing., Disp: 18 g, Rfl: 5    FLUoxetine (PROzac) 20 MG capsule, Take 3 capsules by mouth Daily., Disp: 270 capsule, Rfl: 1    ibuprofen (ADVIL,MOTRIN) 800 MG tablet, Take 1 tablet by mouth Every 6 (Six) Hours As Needed for Mild Pain., Disp: 30 tablet, Rfl: 1    levothyroxine (SYNTHROID, LEVOTHROID) 50 MCG tablet, Take 1 tablet by mouth Daily., Disp: 90 tablet, Rfl: 1    meclizine (ANTIVERT) 25 MG tablet, Take 0.5 tablets by mouth 3 (Three) Times a Day As Needed for Dizziness., Disp: 30 tablet, Rfl: 2    metFORMIN (GLUCOPHAGE) 1000 MG tablet, Take 1 tablet by mouth 2 (Two) Times a Day With Meals., Disp: 180 tablet, Rfl: 1    methocarbamol (ROBAXIN) 500 MG tablet, Take one tablet by oral route twice a day, as needed, Disp: 40 tablet, Rfl: 0    montelukast (SINGULAIR) 10 MG tablet, Take 1 tablet by mouth Every Night., Disp: 90 tablet, Rfl: 1    omeprazole (priLOSEC) 20 MG capsule, Take 1 capsule by mouth 2 (Two) Times a Day., Disp: 180 capsule, Rfl: 1    triamterene-hydrochlorothiazide (MAXZIDE-25) 37.5-25 MG per tablet, Take 1 tablet by mouth Daily., Disp: 90 tablet, Rfl: 1    benzonatate (TESSALON) 200 MG capsule, Take 1 capsule by mouth 2 (Two) Times a Day As Needed., Disp: , Rfl:     carvedilol (COREG) 12.5 MG tablet, Take 1 tablet by mouth 2 (Two) Times a Day., Disp: 180 tablet, Rfl: 3    clonazePAM (KlonoPIN) 1 MG tablet, Take 1 tablet by mouth 2 (Two) Times a Day As Needed  for Anxiety (with flying)., Disp: 10 tablet, Rfl: 0    ferrous gluconate (FERGON) 324 MG tablet, Take 1 tablet by mouth once daily with breakfast, Disp: 30 tablet, Rfl: 0    hydrOXYzine (ATARAX) 25 MG tablet, Take 1 tablet by mouth As Needed., Disp: , Rfl:     losartan (COZAAR) 50 MG tablet, Take 1 tablet by mouth Daily., Disp: 90 tablet, Rfl: 1    NIFEdipine XL (PROCARDIA XL) 60 MG 24 hr tablet, Take 1 tablet by mouth Daily., Disp: 90 tablet, Rfl: 1    nitrofurantoin, macrocrystal-monohydrate, (Macrobid) 100 MG capsule, Take 1 capsule by mouth Every Night., Disp: 90 capsule, Rfl: 1    nitroglycerin (NITROSTAT) 0.4 MG SL tablet, 1 under the tongue as needed for angina, may repeat every 5 mins for up three doses.  If no resolution of chest pain, call 911 or head to the nearest emergency room., Disp: 100 tablet, Rfl: 11    norethindrone (AYGESTIN) 5 MG tablet, Take 1 tablet by mouth Daily., Disp: 30 tablet, Rfl: 0    omega-3 acid ethyl esters (LOVAZA) 1 g capsule, Take 2 capsules by mouth 2 (Two) Times a Day., Disp: 360 capsule, Rfl: 3    ondansetron ODT (ZOFRAN-ODT) 4 MG disintegrating tablet, 1 tablet Every 8 (Eight) Hours As Needed for Nausea or Vomiting., Disp: , Rfl:     rosuvastatin (CRESTOR) 10 MG tablet, Take 1 tablet by mouth Every Night., Disp: 90 tablet, Rfl: 3    solifenacin (VESICARE) 10 MG tablet, Take 1 tablet by mouth Daily., Disp: 60 tablet, Rfl: 1    Allergies:   Allergies   Allergen Reactions    Lisinopril Cough    Claritin-D 24 Hour [Loratadine-Pseudoephedrine Er] Other (See Comments)     Couldn't sleep    Loratadine Anxiety    Morphine Itching    Pseudoephedrine Anxiety       Immunization History   Administered Date(s) Administered    COVID-19 (PFIZER) Purple Cap Monovalent 01/04/2022    Hepatitis B Adult/Adolescent IM 06/01/2005, 10/13/2005, 05/18/2006    Influenza Seasonal Injectable 10/30/2007    Influenza, Unspecified 01/17/2020    Rabies 08/18/2021    Td (TDVAX) 06/01/2005    Tdap 07/27/2021  "     Colorectal Screening:     Last Completed Colonoscopy            Needs Review       COLORECTAL CANCER SCREENING (COLONOSCOPY - Every 10 Years) Tentatively due on 3/5/2034      03/05/2024  Surgical Procedure: COLONOSCOPY    03/05/2024  Colonoscopy                          Pap:    Last Completed Pap Smear    This patient has no relevant Health Maintenance data.        Mammogram:    Last Completed Mammogram    This patient has no relevant Health Maintenance data.             Diet/Physical activity: Appropriate diet and exercise discussed.    PHQ-2 Depression Screening  Little interest or pleasure in doing things?     Feeling down, depressed, or hopeless?     PHQ-2 Total Score             Objective     Physical Exam:  Vital Signs:   Vitals:    03/27/25 0927   BP: 130/80   Pulse: 78   Weight: 132 kg (292 lb)   Height: 162.6 cm (64\")   PF: 97 L/min     Body mass index is 50.12 kg/m².     Physical Exam  Vitals and nursing note reviewed.   Constitutional:       General: She is not in acute distress.     Appearance: Normal appearance. She is not ill-appearing.   HENT:      Head: Normocephalic and atraumatic.      Right Ear: Tympanic membrane and ear canal normal.      Left Ear: Tympanic membrane and ear canal normal.      Nose: Nose normal.   Cardiovascular:      Rate and Rhythm: Normal rate and regular rhythm.      Heart sounds: Normal heart sounds.   Pulmonary:      Effort: Pulmonary effort is normal.      Breath sounds: Normal breath sounds.   Neurological:      Mental Status: She is alert and oriented to person, place, and time. Mental status is at baseline.   Psychiatric:         Mood and Affect: Mood normal.       Physical Exam  Vital Signs  Blood pressure is normal. Pulse is normal.    Procedures  Results      Assessment / Plan      Assessment/Plan:   Diagnoses and all orders for this visit:    1. Routine general medical examination at a health care facility (Primary)  -     CBC & Differential  -     " Comprehensive Metabolic Panel  -     Lipid Panel  -     TSH  -     Hemoglobin A1c    2. Acute midline low back pain with left-sided sciatica  -     methocarbamol (ROBAXIN) 500 MG tablet; Take one tablet by oral route twice a day, as needed  Dispense: 40 tablet; Refill: 0  -     ibuprofen (ADVIL,MOTRIN) 800 MG tablet; Take 1 tablet by mouth Every 6 (Six) Hours As Needed for Mild Pain.  Dispense: 30 tablet; Refill: 1    3. Hypothyroidism (acquired)  -     TSH  -     levothyroxine (SYNTHROID, LEVOTHROID) 50 MCG tablet; Take 1 tablet by mouth Daily.  Dispense: 90 tablet; Refill: 1    4. Essential hypertension  -     CBC & Differential  -     Comprehensive Metabolic Panel  -     Lipid Panel  -     TSH  -     triamterene-hydrochlorothiazide (MAXZIDE-25) 37.5-25 MG per tablet; Take 1 tablet by mouth Daily.  Dispense: 90 tablet; Refill: 1    5. Type 2 diabetes mellitus without complication, without long-term current use of insulin  -     Hemoglobin A1c  -     Microalbumin / Creatinine Urine Ratio - Urine, Clean Catch    6. Gastroesophageal reflux disease, unspecified whether esophagitis present  -     omeprazole (priLOSEC) 20 MG capsule; Take 1 capsule by mouth 2 (Two) Times a Day.  Dispense: 180 capsule; Refill: 1    7. Pulmonary emphysema, unspecified emphysema type  -     albuterol sulfate  (90 Base) MCG/ACT inhaler; Inhale 2 puffs Every 4 (Four) Hours As Needed for Wheezing.  Dispense: 18 g; Refill: 5    8. Allergic rhinitis, unspecified seasonality, unspecified trigger  -     montelukast (SINGULAIR) 10 MG tablet; Take 1 tablet by mouth Every Night.  Dispense: 90 tablet; Refill: 1    9. Uncontrolled type 2 diabetes mellitus with hyperglycemia  -     metFORMIN (GLUCOPHAGE) 1000 MG tablet; Take 1 tablet by mouth 2 (Two) Times a Day With Meals.  Dispense: 180 tablet; Refill: 1    10. Anxiety and depression  -     FLUoxetine (PROzac) 20 MG capsule; Take 3 capsules by mouth Daily.  Dispense: 270 capsule; Refill:  1    11. Anemia, unspecified type    Other orders  -     meclizine (ANTIVERT) 25 MG tablet; Take 0.5 tablets by mouth 3 (Three) Times a Day As Needed for Dizziness.  Dispense: 30 tablet; Refill: 2       Assessment & Plan  1. Back pain.  The neurosurgeon suspects a torn disc and stress fracture. She will be referred to the pain clinic and physical therapy, which may take 6 months to a year. Prescriptions for methocarbamol and ibuprofen 800 mg have been refilled. She is advised to take two Tylenol Arthritis in the morning and two in the evening daily for additional pain relief.    2. Anemia.  Her anemia appears stable, but blood counts will be checked as part of today's lab work.    3. Heartburn.  She is currently taking omeprazole twice a day. The prescription will be changed to a 90-day supply.    4. Asthma.  An albuterol inhaler will be provided with refills available if needed.    5. Cholesterol management.  She is currently on medication prescribed by Dr. Tristan, which has sufficient refills.    6. Hypertension.  She is on carvedilol and a diuretic. Blood pressure is normal.    7. Urinary tract infections.  She is on nitrofurantoin 100 mg for UTI prevention and Vesicare for urination issues.    8. Birth control.  She is using birth control pills to manage menstrual bleeding and will continue until age 50.    9. Allergies.  She is on Singulair for allergies.    10. Diabetes mellitus.  A hemoglobin A1c test will be conducted today to assess blood sugar control. Metformin prescription will be refilled.    11. Depression.  She is on fluoxetine 60 mg daily, prescribed as three 20 mg tablets. Hydroxyzine will be refilled if needed.    12. Health maintenance.  She completed a series for hepatitis a few years ago and is up to date on her tetanus shot. She will not be due for a pneumonia shot until age 50. Colon cancer screening was done last year, and she is up to date with that. A urine test will be conducted today to  check for protein related to kidney function issues. A Pap smear is due this year, and she believes she had a mammogram this year as well. An eye exam was done last year, and she was told her eyesight had improved. No further COVID-19 vaccines are needed at this point.    PROCEDURE  The patient underwent an ablation procedure in the past, which was unsuccessful in stopping the bleeding.        Follow Up:   No follow-ups on file.    Healthcare Maintenance:   Counseling provided on appropriate health maintenance..   Gina Loomis voices understanding and acceptance of this advice and will call back with any further questions or concerns. AVS with preventive healthcare tips printed for patient.     Daniel Stratton MD  [facility]    Patient or patient representative verbalized consent for the use of Ambient Listening during the visit with  Daniel Stratton MD for chart documentation. 3/27/2025  12:29 EDT

## 2025-03-28 LAB
ALBUMIN SERPL-MCNC: 4.6 G/DL (ref 3.9–4.9)
ALBUMIN/CREAT UR: 16 MG/G CREAT (ref 0–29)
ALP SERPL-CCNC: 48 IU/L (ref 44–121)
ALT SERPL-CCNC: 26 IU/L (ref 0–32)
AST SERPL-CCNC: 23 IU/L (ref 0–40)
BASOPHILS # BLD AUTO: 0 X10E3/UL (ref 0–0.2)
BASOPHILS NFR BLD AUTO: 0 %
BILIRUB SERPL-MCNC: 0.3 MG/DL (ref 0–1.2)
BUN SERPL-MCNC: 14 MG/DL (ref 6–24)
BUN/CREAT SERPL: 21 (ref 9–23)
CALCIUM SERPL-MCNC: 9.7 MG/DL (ref 8.7–10.2)
CHLORIDE SERPL-SCNC: 101 MMOL/L (ref 96–106)
CHOLEST SERPL-MCNC: 143 MG/DL (ref 100–199)
CO2 SERPL-SCNC: 22 MMOL/L (ref 20–29)
CREAT SERPL-MCNC: 0.67 MG/DL (ref 0.57–1)
CREAT UR-MCNC: 139.7 MG/DL
EGFRCR SERPLBLD CKD-EPI 2021: 110 ML/MIN/1.73
EOSINOPHIL # BLD AUTO: 0.3 X10E3/UL (ref 0–0.4)
EOSINOPHIL NFR BLD AUTO: 3 %
ERYTHROCYTE [DISTWIDTH] IN BLOOD BY AUTOMATED COUNT: 11.9 % (ref 11.7–15.4)
GLOBULIN SER CALC-MCNC: 2.6 G/DL (ref 1.5–4.5)
GLUCOSE SERPL-MCNC: 124 MG/DL (ref 70–99)
HBA1C MFR BLD: 6.8 % (ref 4.8–5.6)
HCT VFR BLD AUTO: 41.4 % (ref 34–46.6)
HDLC SERPL-MCNC: 40 MG/DL
HGB BLD-MCNC: 14 G/DL (ref 11.1–15.9)
IMM GRANULOCYTES # BLD AUTO: 0 X10E3/UL (ref 0–0.1)
IMM GRANULOCYTES NFR BLD AUTO: 0 %
LDLC SERPL CALC-MCNC: 70 MG/DL (ref 0–99)
LYMPHOCYTES # BLD AUTO: 2.4 X10E3/UL (ref 0.7–3.1)
LYMPHOCYTES NFR BLD AUTO: 29 %
MCH RBC QN AUTO: 32.1 PG (ref 26.6–33)
MCHC RBC AUTO-ENTMCNC: 33.8 G/DL (ref 31.5–35.7)
MCV RBC AUTO: 95 FL (ref 79–97)
MICROALBUMIN UR-MCNC: 22.6 UG/ML
MONOCYTES # BLD AUTO: 0.6 X10E3/UL (ref 0.1–0.9)
MONOCYTES NFR BLD AUTO: 7 %
NEUTROPHILS # BLD AUTO: 4.9 X10E3/UL (ref 1.4–7)
NEUTROPHILS NFR BLD AUTO: 61 %
PLATELET # BLD AUTO: 265 X10E3/UL (ref 150–450)
POTASSIUM SERPL-SCNC: 4.2 MMOL/L (ref 3.5–5.2)
PROT SERPL-MCNC: 7.2 G/DL (ref 6–8.5)
RBC # BLD AUTO: 4.36 X10E6/UL (ref 3.77–5.28)
SODIUM SERPL-SCNC: 139 MMOL/L (ref 134–144)
TRIGL SERPL-MCNC: 196 MG/DL (ref 0–149)
TSH SERPL DL<=0.005 MIU/L-ACNC: 2.48 UIU/ML (ref 0.45–4.5)
VLDLC SERPL CALC-MCNC: 33 MG/DL (ref 5–40)
WBC # BLD AUTO: 8.2 X10E3/UL (ref 3.4–10.8)

## 2025-03-30 ENCOUNTER — RESULTS FOLLOW-UP (OUTPATIENT)
Dept: FAMILY MEDICINE CLINIC | Facility: CLINIC | Age: 45
End: 2025-03-30
Payer: COMMERCIAL

## 2025-03-31 ENCOUNTER — TELEPHONE (OUTPATIENT)
Dept: FAMILY MEDICINE CLINIC | Facility: CLINIC | Age: 45
End: 2025-03-31
Payer: COMMERCIAL

## 2025-04-01 DIAGNOSIS — D50.9 IRON DEFICIENCY ANEMIA, UNSPECIFIED IRON DEFICIENCY ANEMIA TYPE: ICD-10-CM

## 2025-04-01 RX ORDER — FERROUS GLUCONATE 324(38)MG
1 TABLET ORAL
Qty: 30 TABLET | Refills: 0 | Status: SHIPPED | OUTPATIENT
Start: 2025-04-01

## 2025-04-04 ENCOUNTER — OFFICE VISIT (OUTPATIENT)
Dept: PAIN MEDICINE | Facility: CLINIC | Age: 45
End: 2025-04-04
Payer: COMMERCIAL

## 2025-04-04 VITALS — HEIGHT: 64 IN | WEIGHT: 293 LBS | BODY MASS INDEX: 50.02 KG/M2

## 2025-04-04 DIAGNOSIS — M47.817 LUMBOSACRAL SPONDYLOSIS WITHOUT MYELOPATHY: ICD-10-CM

## 2025-04-04 DIAGNOSIS — G89.4 CHRONIC PAIN SYNDROME: ICD-10-CM

## 2025-04-04 DIAGNOSIS — M54.16 LUMBAR RADICULOPATHY: Primary | ICD-10-CM

## 2025-04-04 PROCEDURE — 99204 OFFICE O/P NEW MOD 45 MIN: CPT | Performed by: STUDENT IN AN ORGANIZED HEALTH CARE EDUCATION/TRAINING PROGRAM

## 2025-04-04 NOTE — PROGRESS NOTES
Referring Physician: Denys Denson MD  [address] [facility]  [address]  [place] [address]    Primary Physician: Daniel Stratton MD    CHIEF COMPLAINT or REASON FOR VISIT: Back Pain and Establish Care      Initial history of present illness on 04/04/2025:  Ms. Gina Loomis is 45 y.o. female who presents as a new patient referral for evaluation treatment of chronic low back pain with radiation bilateral lower extremities.  Patient has had back pain in the past but more recently developed acute low back pain radiation bilateral lower extremities after a fall.  She describes lumbosacral back pain with radiation to bilateral lower extremities.  She works with [employer].  Pain is exacerbated with activity.  Relieved with rest.  She has seen neurosurgery, Dr. Denson, who referred for nonsurgical management.  She has tried chiropractic with modest benefit.  She has tried NSAIDs, acetaminophen with modest benefit.  Patient denies any new onset bowel or bladder dysfunction, lower extremity weakness, saddle anesthesia or unexplained weight loss.       Interval history:    Interventions:      Objective Pain Scoring:   BRIEF PAIN INVENTORY:  Total score:   Pain Score    04/04/25 1107   PainSc: 5    PainLoc: Back      PHQ-2: 1  PHQ-9:    Opioid Risk Tool:         Review of Systems:   ROS negative except as otherwise noted     Past Medical History:   Past Medical History:   Diagnosis Date    Allergic     Anemia     Anxiety state 02/05/2010    Anxiety syndrome     Arthritis 2 4 2017    Asthma     Bell's palsy     twice    Carpal tunnel syndrome     Cervical disc disorder 9 10 2011    Chronic bronchitis     Clotting disorder     CTS (carpal tunnel syndrome)     Depression     Diabetes mellitus     Dizziness     Fibroid     Fracture     GERD (gastroesophageal reflux disease) 02/05/2010    Headache, tension-type     HL (hearing loss)     Hyperlipidemia 01/31/2013    Hypertension     Hypothyroidism 01/31/2013     Iron deficiency anemia secondary to inadequate dietary iron intake 2023    Kidney infection     Low back pain 12 3 2025    Lumbosacral disc disease 9 10     Memory loss     Memory loss     Obesity     Osteoarthritis     Preeclampsia 2004        Shingles     Sleep apnea 05 12 12    Spinal stenosis     Thoracic disc disorder 9 10     Thyroid disease     Trigeminal neuralgia     Upper respiratory infection 2018    Urinary incontinence     Vaginal infection     Weakness          Past Surgical History:   Past Surgical History:   Procedure Laterality Date    BLADDER SURGERY  2024    Scope    CARPAL TUNNEL RELEASE Left 2015    CARPAL TUNNEL RELEASE Right 08/15/2008     SECTION      X 2,  and     COLONOSCOPY N/A 2024    Procedure: COLONOSCOPY;  Surgeon: Ronald Reyes MD;  Location:  [facility];  Service: Gastroenterology;  Laterality: N/A;    COLONOSCOPY  2024    ENDOMETRIAL ABLATION      ENDOSCOPY N/A 2024    Procedure: ESOPHAGOGASTRODUODENOSCOPY;  Surgeon: Ronald Reyes MD;  Location:  [facility];  Service: Gastroenterology;  Laterality: N/A;    HERNIA REPAIR      incisional on abdomen    HYSTEROSCOPY ENDOMETRIAL ABLATION TUBAL STERILIZATION  2020    LAPAROSCOPIC TUBAL LIGATION  2020    TUBAL ABDOMINAL LIGATION           Family History   Family History   Problem Relation Age of Onset    Hypertension Mother     Coronary artery disease Mother     Heart disease Mother     High cholesterol Mother     Asthma Mother     COPD Mother     High cholesterol Father     Heart disease Father     Diabetes Father     Coronary artery disease Father     Kidney failure Father     Hypertension Father     Hyperlipidemia Father     Diabetes Sister     Lymphoma Maternal Grandmother     Diabetes Maternal Grandmother     Obesity Maternal Grandmother     Diabetes Paternal Grandmother          Social History   Social History     Socioeconomic  History    Marital status:    Tobacco Use    Smoking status: Every Day     Current packs/day: 1.00     Average packs/day: 1.1 packs/day for 43.0 years (48.3 ttl pk-yrs)     Types: Cigarettes     Start date: 1/1/1993     Passive exposure: Current    Smokeless tobacco: Never   Vaping Use    Vaping status: Never Used   Substance and Sexual Activity    Alcohol use: Not Currently    Drug use: Never    Sexual activity: Yes     Partners: Male     Birth control/protection: Tubal ligation        Medications:     Current Outpatient Medications:     albuterol sulfate  (90 Base) MCG/ACT inhaler, Inhale 2 puffs Every 4 (Four) Hours As Needed for Wheezing., Disp: 18 g, Rfl: 5    benzonatate (TESSALON) 200 MG capsule, Take 1 capsule by mouth 2 (Two) Times a Day As Needed., Disp: , Rfl:     carvedilol (COREG) 12.5 MG tablet, Take 1 tablet by mouth 2 (Two) Times a Day., Disp: 180 tablet, Rfl: 3    clonazePAM (KlonoPIN) 1 MG tablet, Take 1 tablet by mouth 2 (Two) Times a Day As Needed for Anxiety (with flying)., Disp: 10 tablet, Rfl: 0    ferrous gluconate (FERGON) 324 MG tablet, Take 1 tablet by mouth once daily with breakfast, Disp: 30 tablet, Rfl: 0    FLUoxetine (PROzac) 20 MG capsule, Take 3 capsules by mouth Daily., Disp: 270 capsule, Rfl: 1    hydrOXYzine (ATARAX) 25 MG tablet, Take 1 tablet by mouth As Needed., Disp: , Rfl:     ibuprofen (ADVIL,MOTRIN) 800 MG tablet, Take 1 tablet by mouth Every 6 (Six) Hours As Needed for Mild Pain., Disp: 30 tablet, Rfl: 1    levothyroxine (SYNTHROID, LEVOTHROID) 50 MCG tablet, Take 1 tablet by mouth Daily., Disp: 90 tablet, Rfl: 1    losartan (COZAAR) 50 MG tablet, Take 1 tablet by mouth Daily., Disp: 90 tablet, Rfl: 1    meclizine (ANTIVERT) 25 MG tablet, Take 0.5 tablets by mouth 3 (Three) Times a Day As Needed for Dizziness., Disp: 30 tablet, Rfl: 2    metFORMIN (GLUCOPHAGE) 1000 MG tablet, Take 1 tablet by mouth 2 (Two) Times a Day With Meals., Disp: 180 tablet, Rfl: 1     "methocarbamol (ROBAXIN) 500 MG tablet, Take one tablet by oral route twice a day, as needed, Disp: 40 tablet, Rfl: 0    montelukast (SINGULAIR) 10 MG tablet, Take 1 tablet by mouth Every Night., Disp: 90 tablet, Rfl: 1    NIFEdipine XL (PROCARDIA XL) 60 MG 24 hr tablet, Take 1 tablet by mouth Daily., Disp: 90 tablet, Rfl: 1    nitrofurantoin, macrocrystal-monohydrate, (Macrobid) 100 MG capsule, Take 1 capsule by mouth Every Night., Disp: 90 capsule, Rfl: 1    nitroglycerin (NITROSTAT) 0.4 MG SL tablet, 1 under the tongue as needed for angina, may repeat every 5 mins for up three doses.  If no resolution of chest pain, call 911 or head to the nearest emergency room., Disp: 100 tablet, Rfl: 11    norethindrone (AYGESTIN) 5 MG tablet, Take 1 tablet by mouth Daily., Disp: 30 tablet, Rfl: 0    omega-3 acid ethyl esters (LOVAZA) 1 g capsule, Take 2 capsules by mouth 2 (Two) Times a Day., Disp: 360 capsule, Rfl: 3    omeprazole (priLOSEC) 20 MG capsule, Take 1 capsule by mouth 2 (Two) Times a Day., Disp: 180 capsule, Rfl: 1    ondansetron ODT (ZOFRAN-ODT) 4 MG disintegrating tablet, 1 tablet Every 8 (Eight) Hours As Needed for Nausea or Vomiting., Disp: , Rfl:     rosuvastatin (CRESTOR) 10 MG tablet, Take 1 tablet by mouth Every Night., Disp: 90 tablet, Rfl: 3    solifenacin (VESICARE) 10 MG tablet, Take 1 tablet by mouth Daily., Disp: 60 tablet, Rfl: 1    triamterene-hydrochlorothiazide (MAXZIDE-25) 37.5-25 MG per tablet, Take 1 tablet by mouth Daily., Disp: 90 tablet, Rfl: 1        Physical Exam:     Vitals:    04/04/25 1107   Weight: 134 kg (296 lb 1.6 oz)   Height: 162.6 cm (64.02\")   PainSc: 5    PainLoc: Back        General: Alert and oriented, No acute distress.   HEENT: Normocephalic, atraumatic.   Cardiovascular: No gross edema.  Obese  Respiratory: Respirations are non-labored    Lumbar Spine:   No masses or atrophy  Range of motion - Flexion reduced. Extension reduced.    Facet Loading: Positive " bilaterally  Facet Palpation -tender bilaterally  Renetta finger/Gaenslen's/Dominic's/JUAREZ/Thigh thrust -   Straight leg raise/slump test: Negative bilaterally  Multifidus toe-touch test:    Motor Exam:       Strength: Rate on 1-5 scale Right Left    L1/2- hip flexion 5/5  5/5    L3- knee extension 5/5  5/5    L4- ankle dorsiflexion 5/5  5/5    L5- great toe extension 5/5  5/5    S1- ankle plantarflexion 5/5  5/5    Sensory Exam: Full and equal sensation to light touch throughout.     Neurologic: Cranial Nerves II-XII are grossly intact.      Psychiatric: Cooperative.   Gait: Normal   Assistive Devices: None    Imaging Studies:   Results for orders placed during the hospital encounter of 02/12/25    MRI Lumbar Spine Without Contrast    Narrative  MRI LUMBAR SPINE WO CONTRAST    Date of Exam: 2/12/2025 5:08 PM EST    Indication: chronic lumbar pain with bilateral sciatica - patient having left-sided pain and bilateral numbness; she has been seeing chiropractor and has had x-rays in their office.    Comparison: None available.    Technique:  Routine multiplanar/multisequence sequence images of the lumbar spine were obtained without contrast administration.      Findings:  The last well formed disc space is labeled as L5-S1.    Distal cord and conus: Normal morphology and signal. The conus medullaris terminates at L1-L2.    Cauda equina and nerve roots: Normal morphology and signal.    Alignment: The vertebral bodies appear in normal alignment.    Bones: The vertebral body heights are preserved. The bone marrow signal is within normal limits for age. No suspicious osseous lesions are demonstrated.    Description of degenerative changes at specific levels is as follows:    T12-L1: No spinal canal or neuroforaminal stenosis.    L1-2: No spinal canal or neuroforaminal stenosis.    L2-3: No spinal canal or neuroforaminal stenosis.    L3-4: Mild facet arthropathy. No spinal canal or neuroforaminal stenosis.    L4-5:  Central disc protrusion and facet arthropathy. Mild canal stenosis. No foraminal stenosis.    L5-S1: Facet arthropathy and small posterior disc bulge. Mild left greater than right neural foraminal stenosis.    Extra-vertebral soft tissues: Normal.    Visualized abdomen/pelvis: Normal.    Impression  Impression:  Mild degenerative changes of the lumbar spine without high-grade canal or foraminal stenosis.        Electronically Signed: Vin Newsome MD  2/13/2025 12:01 AM EST  Workstation ID: [id number]        Independent review of radiographic imaging: Available for my interpretation of the lumbar MRI dated February 12, 2025 demonstrating L5/S1 degenerative disc disease; no high-grade canal or neuroforaminal stenosis; facet arthropathy.  Small posterior L4/L5 disc protrusion with possible annular tear.    Impression & Plan:       04/04/2025: Gina Loomis is a 45 y.o. female with past medical history significant for obesity, DM2, HLD, HTN, MDD, YUSUF, anxiety who presents to the pain clinic for evaluation and treatment of chronic back pain with ration of bilateral lower extremities.  MRI interpretation as above.  Evaluation consistent with lumbar radiculopathy, lumbar spondylosis.  We discussed epidural steroid injection to improve pain.  If greater than 50% relief for at least 2-3 months can consider repeat as needed every 3 to 4 months.  I had a discussion with the patient regarding the risks of the procedure including bleeding, infection, damage to surrounding structures.  We discussed the potential adverse effects of corticosteroid injection including flushing of the face, lipodystrophy, skin discoloration, elevated blood glucose, increased blood pressure.  Risks of frequent steroid administration include weight gain, hormonal changes, mood changes, osteoporosis.  Consider LMBB/RFA.    1. Lumbar radiculopathy    2. Lumbosacral spondylosis without myelopathy    3. Chronic pain syndrome        PLAN:  1.  Medications: Consider Voltaren topical, NSAIDs, Tylenol.  Can trial turmeric 500 mg twice daily if NSAID contraindicated.    2. Physical Therapy: Continue PT    3. Psychological: defer    4. Complementary and alternative (CAM) Therapies: Continue chiropractic    5. Labs/Diagnostic studies: None indicated     6. Imaging: MRI independently interpreted and reviewed with patient    7. Interventions: Lumbar interlaminar (possible L5/S1) epidural steroid injection (03986).  Consider L MBB    8. Referrals: None indicated     9. Records: Neurosurgical notes reviewed    10. Lifestyle goals:    Follow-up 1 month      [facility]  Thang Crockett MD          Quality Metrics:                Please note that portions of this note were completed with a voice recognition program.      Any copied data in any portion of my note from previous notes included in the HPI, PE, MDM and/or assessment and plan has been reviewed by myself and accurate as of this date.      The 21st Century Cures Act makes medical notes like this available to patients in the interest of transparency. This is a medical document intended as peer to peer communication. It is written in medical language and may contain abbreviations or verbiage that are unfamiliar. It may appear blunt or direct. Medical documents are intended to carry relevant information, facts as evident, and the clinical opinion of the practitioner.

## 2025-04-10 ENCOUNTER — OUTSIDE FACILITY SERVICE (OUTPATIENT)
Dept: PAIN MEDICINE | Facility: CLINIC | Age: 45
End: 2025-04-10
Payer: COMMERCIAL

## 2025-04-10 ENCOUNTER — DOCUMENTATION (OUTPATIENT)
Dept: PAIN MEDICINE | Facility: CLINIC | Age: 45
End: 2025-04-10

## 2025-04-10 PROCEDURE — 62323 NJX INTERLAMINAR LMBR/SAC: CPT | Performed by: STUDENT IN AN ORGANIZED HEALTH CARE EDUCATION/TRAINING PROGRAM

## 2025-04-17 ENCOUNTER — OFFICE VISIT (OUTPATIENT)
Dept: UROLOGY | Facility: CLINIC | Age: 45
End: 2025-04-17
Payer: COMMERCIAL

## 2025-04-17 DIAGNOSIS — R31.29 MICROSCOPIC HEMATURIA: Primary | ICD-10-CM

## 2025-04-17 DIAGNOSIS — N39.0 RECURRENT UTI: ICD-10-CM

## 2025-04-17 DIAGNOSIS — N32.81 OAB (OVERACTIVE BLADDER): ICD-10-CM

## 2025-04-17 LAB
BACTERIA UR QL AUTO: ABNORMAL /HPF
BILIRUB BLD-MCNC: NEGATIVE MG/DL
BILIRUB UR QL STRIP: NEGATIVE
CLARITY UR: CLEAR
CLARITY, POC: CLEAR
COLOR UR: YELLOW
COLOR UR: YELLOW
EXPIRATION DATE: ABNORMAL
GLUCOSE UR STRIP-MCNC: ABNORMAL MG/DL
GLUCOSE UR STRIP-MCNC: NEGATIVE MG/DL
HGB UR QL STRIP.AUTO: NEGATIVE
HYALINE CASTS UR QL AUTO: ABNORMAL /LPF
KETONES UR QL STRIP: NEGATIVE
KETONES UR QL: NEGATIVE
LEUKOCYTE EST, POC: NEGATIVE
LEUKOCYTE ESTERASE UR QL STRIP.AUTO: NEGATIVE
Lab: ABNORMAL
NITRITE UR QL STRIP: NEGATIVE
NITRITE UR-MCNC: NEGATIVE MG/ML
PH UR STRIP.AUTO: 7 [PH] (ref 5–8)
PH UR: 6 [PH] (ref 5–8)
PROT UR QL STRIP: NEGATIVE
PROT UR STRIP-MCNC: NEGATIVE MG/DL
RBC # UR STRIP: ABNORMAL /HPF
RBC # UR STRIP: ABNORMAL /UL
REF LAB TEST METHOD: ABNORMAL
SP GR UR STRIP: 1.02 (ref 1–1.03)
SP GR UR: 1.02 (ref 1–1.03)
SQUAMOUS #/AREA URNS HPF: ABNORMAL /HPF
UROBILINOGEN UR QL STRIP: ABNORMAL
UROBILINOGEN UR QL: NORMAL
WBC # UR STRIP: ABNORMAL /HPF

## 2025-04-17 PROCEDURE — 99213 OFFICE O/P EST LOW 20 MIN: CPT | Performed by: NURSE PRACTITIONER

## 2025-04-17 PROCEDURE — 81001 URINALYSIS AUTO W/SCOPE: CPT | Performed by: NURSE PRACTITIONER

## 2025-04-17 RX ORDER — FLUCONAZOLE 200 MG/1
200 TABLET ORAL ONCE
COMMUNITY
Start: 2025-04-12

## 2025-04-17 NOTE — PROGRESS NOTES
UTI Office Visit      Patient Name: Gina Loomis  : 1980   MRN: [id number]     Chief Complaint:  UTI   Chief Complaint   Patient presents with    Recurrent UTI    Overactive bladder    Blood in Urine       Referring Provider: No ref. provider found    History of Present Illness: Gina Loomis is a 45 y.o.  who presents today for history of recurrent UTI and overactive bladder.  She was initiated on nightly Macrobid at last office visit in January and denies any UTIs since beginning nightly Macrobid.  She is taking Vesicare 10 mg daily.  She does report continued nighttime urinary incontinence.  She does have sleep apnea and wears a CPAP.  She is still currently smoking cigarettes.  Last UA micro negative for microscopic hematuria.  Urinalysis with blood today and we will send urine for UA micro.  She is feeling well today.    Subjective      Review of System: Review of Systems   Genitourinary:  Positive for frequency and urgency.   All other systems reviewed and are negative.     I have reviewed the ROS documented by my clinical staff, I have updated appropriately and I agree. BENY Solorzano    Past Medical History:  Past Medical History:   Diagnosis Date    Allergic     Anemia     Anxiety state 2010    Anxiety syndrome     Arthritis 2 4 2017    Asthma     Bell's palsy     twice    Carpal tunnel syndrome     Cervical disc disorder 9 10     Chronic bronchitis     Clotting disorder     CTS (carpal tunnel syndrome)     Depression     Diabetes mellitus     Dizziness     Fibroid     Fracture     GERD (gastroesophageal reflux disease) 2010    Headache, tension-type     HL (hearing loss)     Hyperlipidemia 2013    Hypertension     Hypothyroidism 2013    Iron deficiency anemia secondary to inadequate dietary iron intake 2023    Kidney infection     Low back pain 12 3 2025    Lumbosacral disc disease 9 10 2010    Memory loss     Memory loss     Obesity      Osteoarthritis     Preeclampsia 2004        Shingles     Sleep apnea 12    Spinal stenosis     Stress fracture 2025    lower spine fracture + torn disc    Thoracic disc disorder 9 10 2011    Thyroid disease     Trigeminal neuralgia     Upper respiratory infection 2018    Urinary incontinence     Vaginal infection     Weakness        Past Surgical History:  Past Surgical History:   Procedure Laterality Date    BLADDER SURGERY  2024    Scope    CARPAL TUNNEL RELEASE Left 2015    CARPAL TUNNEL RELEASE Right 08/15/2008     SECTION      X 2,  and     COLONOSCOPY N/A 2024    Procedure: COLONOSCOPY;  Surgeon: Ronald Reyes MD;  Location:  [facility];  Service: Gastroenterology;  Laterality: N/A;    COLONOSCOPY  2024    ENDOMETRIAL ABLATION      ENDOSCOPY N/A 2024    Procedure: ESOPHAGOGASTRODUODENOSCOPY;  Surgeon: Ronald Reyes MD;  Location:  [facility];  Service: Gastroenterology;  Laterality: N/A;    HERNIA REPAIR      incisional on abdomen    HYSTEROSCOPY ENDOMETRIAL ABLATION TUBAL STERILIZATION      LAPAROSCOPIC TUBAL LIGATION      TUBAL ABDOMINAL LIGATION         Medications:    Current Outpatient Medications:     albuterol sulfate  (90 Base) MCG/ACT inhaler, Inhale 2 puffs Every 4 (Four) Hours As Needed for Wheezing., Disp: 18 g, Rfl: 5    amoxicillin-clavulanate (AUGMENTIN) 875-125 MG per tablet, Take 1 tablet by mouth 2 (Two) Times a Day With Meals., Disp: , Rfl:     benzonatate (TESSALON) 200 MG capsule, Take 1 capsule by mouth 2 (Two) Times a Day As Needed., Disp: , Rfl:     carvedilol (COREG) 12.5 MG tablet, Take 1 tablet by mouth 2 (Two) Times a Day., Disp: 180 tablet, Rfl: 3    clonazePAM (KlonoPIN) 1 MG tablet, Take 1 tablet by mouth 2 (Two) Times a Day As Needed for Anxiety (with flying)., Disp: 10 tablet, Rfl: 0    ferrous gluconate (FERGON) 324 MG tablet, Take 1 tablet by mouth once daily with  breakfast, Disp: 30 tablet, Rfl: 0    fluconazole (DIFLUCAN) 200 MG tablet, Take 1 tablet by mouth 1 (One) Time., Disp: , Rfl:     FLUoxetine (PROzac) 20 MG capsule, Take 3 capsules by mouth Daily., Disp: 270 capsule, Rfl: 1    hydrOXYzine (ATARAX) 25 MG tablet, Take 1 tablet by mouth As Needed., Disp: , Rfl:     ibuprofen (ADVIL,MOTRIN) 800 MG tablet, Take 1 tablet by mouth Every 6 (Six) Hours As Needed for Mild Pain., Disp: 30 tablet, Rfl: 1    levothyroxine (SYNTHROID, LEVOTHROID) 50 MCG tablet, Take 1 tablet by mouth Daily., Disp: 90 tablet, Rfl: 1    losartan (COZAAR) 50 MG tablet, Take 1 tablet by mouth Daily., Disp: 90 tablet, Rfl: 1    meclizine (ANTIVERT) 25 MG tablet, Take 0.5 tablets by mouth 3 (Three) Times a Day As Needed for Dizziness., Disp: 30 tablet, Rfl: 2    metFORMIN (GLUCOPHAGE) 1000 MG tablet, Take 1 tablet by mouth 2 (Two) Times a Day With Meals., Disp: 180 tablet, Rfl: 1    methocarbamol (ROBAXIN) 500 MG tablet, Take one tablet by oral route twice a day, as needed, Disp: 40 tablet, Rfl: 0    montelukast (SINGULAIR) 10 MG tablet, Take 1 tablet by mouth Every Night., Disp: 90 tablet, Rfl: 1    NIFEdipine XL (PROCARDIA XL) 60 MG 24 hr tablet, Take 1 tablet by mouth Daily., Disp: 90 tablet, Rfl: 1    nitrofurantoin, macrocrystal-monohydrate, (Macrobid) 100 MG capsule, Take 1 capsule by mouth Every Night., Disp: 90 capsule, Rfl: 1    nitroglycerin (NITROSTAT) 0.4 MG SL tablet, 1 under the tongue as needed for angina, may repeat every 5 mins for up three doses.  If no resolution of chest pain, call 911 or head to the nearest emergency room., Disp: 100 tablet, Rfl: 11    norethindrone (AYGESTIN) 5 MG tablet, Take 1 tablet by mouth Daily., Disp: 30 tablet, Rfl: 0    omega-3 acid ethyl esters (LOVAZA) 1 g capsule, Take 2 capsules by mouth 2 (Two) Times a Day., Disp: 360 capsule, Rfl: 3    omeprazole (priLOSEC) 20 MG capsule, Take 1 capsule by mouth 2 (Two) Times a Day., Disp: 180 capsule, Rfl: 1     ondansetron ODT (ZOFRAN-ODT) 4 MG disintegrating tablet, 1 tablet Every 8 (Eight) Hours As Needed for Nausea or Vomiting., Disp: , Rfl:     rosuvastatin (CRESTOR) 10 MG tablet, Take 1 tablet by mouth Every Night., Disp: 90 tablet, Rfl: 3    solifenacin (VESICARE) 10 MG tablet, Take 1 tablet by mouth Daily., Disp: 60 tablet, Rfl: 1    triamterene-hydrochlorothiazide (MAXZIDE-25) 37.5-25 MG per tablet, Take 1 tablet by mouth Daily., Disp: 90 tablet, Rfl: 1    Allergies:  Allergies   Allergen Reactions    Lisinopril Cough    Claritin-D 24 Hour [Loratadine-Pseudoephedrine Er] Other (See Comments)     Couldn't sleep    Loratadine Anxiety    Morphine Itching    Pseudoephedrine Anxiety       Social History:  Social History     Socioeconomic History    Marital status:    Tobacco Use    Smoking status: Every Day     Current packs/day: 1.00     Average packs/day: 1.1 packs/day for 43.0 years (48.3 ttl pk-yrs)     Types: Cigarettes     Start date: 1/1/1993     Passive exposure: Current    Smokeless tobacco: Never   Vaping Use    Vaping status: Never Used   Substance and Sexual Activity    Alcohol use: Not Currently    Drug use: Never    Sexual activity: Yes     Partners: Male     Birth control/protection: Tubal ligation       Family History:  Family History   Problem Relation Age of Onset    High cholesterol Father     Heart disease Father     Diabetes Father     Coronary artery disease Father     Kidney failure Father     Hypertension Father     Hyperlipidemia Father     Hypertension Mother     Coronary artery disease Mother     Heart disease Mother     High cholesterol Mother     Asthma Mother     COPD Mother     Diabetes Paternal Grandmother     Lymphoma Maternal Grandmother     Diabetes Maternal Grandmother     Obesity Maternal Grandmother     Diabetes Sister     Kidney cancer Neg Hx         bladder cancer     Bladder & Bowel Symptom Questionnaire    How often do you usually urinate during the day ?   3 - About  every 1-2 hours   2.   How many timed do you urinate at night?   4 - 5 or more times at night   3.   What is the reason that you usually urinate?   2 - Moderate urge (can delay 10-60 min)   4.   Once you get the urge to go, how long can you     comfortably delay?   2 - 10-30 min   5.   How often do you get a sudden urge that makes you rush to the bathroom?   2 - A few times a month   6.   How often does a sudden urge to urinate result in you leaking urine or wetting pads?   1 - Rarely   7.  In your opinion, how good is your bladder control?   3 - Poor   8.  Do you have accidental bowel leakage?   no   9.  Do you have difficulty fully emptying your bladder?   no   10.  Do you experience accidental leakage when performing some physical activity such as coughing, sneezing, laughing or exercise?   yes   11. Have you tried medications to help improve your symptoms?   yes   12. Would you be interested in learning about a long-lasting option that may help you with your symptoms?   yes                                                                             Total Score   13     0-7 (Mild) 8-16 (Moderate) 17-28 (Severe)      Objective     Physical Exam:   Vital Signs: There were no vitals filed for this visit.  There is no height or weight on file to calculate BMI.     Physical Exam  Vitals and nursing note reviewed.   Constitutional:       Appearance: Normal appearance.   HENT:      Head: Normocephalic and atraumatic.      Nose: Nose normal.      Mouth/Throat:      Mouth: Mucous membranes are moist.   Eyes:      Pupils: Pupils are equal, round, and reactive to light.   Pulmonary:      Effort: Pulmonary effort is normal.   Abdominal:      General: Abdomen is flat.      Palpations: Abdomen is soft.   Musculoskeletal:         General: Normal range of motion.      Cervical back: Normal range of motion.   Skin:     General: Skin is warm and dry.      Capillary Refill: Capillary refill takes less than 2 seconds.    Neurological:      General: No focal deficit present.      Mental Status: She is alert.   Psychiatric:         Mood and Affect: Mood normal.       Labs:   Brief Urine Lab Results  (Last result in the past 365 days)        Color   Clarity   Blood   Leuk Est   Nitrite   Protein   CREAT   Urine HCG        03/27/25 1027             139.7                 Urine Culture          1/14/2025    15:50 1/28/2025    16:13   Urine Culture   Urine Culture No growth  Final report         Lab Results   Component Value Date    GLUCOSE 124 (H) 03/27/2025    CALCIUM 9.7 03/27/2025     03/27/2025    K 4.2 03/27/2025    CO2 22 03/27/2025     03/27/2025    BUN 14 03/27/2025    CREATININE 0.67 03/27/2025    BCR 21 03/27/2025       Lab Results   Component Value Date    WBC 8.2 03/27/2025    HGB 14.0 03/27/2025    HCT 41.4 03/27/2025    MCV 95 03/27/2025     03/27/2025       Images:   MRI Lumbar Spine Without Contrast  Result Date: 2/13/2025  Impression: Mild degenerative changes of the lumbar spine without high-grade canal or foraminal stenosis. Electronically Signed: Vin Newsome MD  2/13/2025 12:01 AM EST  Workstation ID: [id number]      Measures:   Tobacco:   Gina Loomis  reports that she has been smoking cigarettes. She started smoking about 32 years ago. She has a 48.3 pack-year smoking history. She has been exposed to tobacco smoke. She has never used smokeless tobacco.    I advised her to quit.        Urine Incontinence: Patient reports that she is currently experiencing night time symptoms of urinary incontinence.      Assessment / Plan      Assessment:  Gina Loomis is a 45 y.o. who presented today with history of recurrent UTI and overactive bladder.  She has not had a UTI since initiating nightly Macrobid in January.  She will plan to begin d-mannose daily and will transition off nightly Macrobid.  She will follow-up in 3 months for symptom check.  We discussed if she has UTI symptoms in the  interim to notify our office.  She is agreeable plan of care.  We also discussed to decrease caffeine intake and we discussed smoking cessation.  We discussed transitioning from Vesicare to oxybutynin given her continued nighttime urinary incontinence.  She defers at this time but may consider in the future.    Diagnoses and all orders for this visit:    1. Microscopic hematuria (Primary)  -     Urinalysis With Microscopic - Urine, Clean Catch; Future    2. Recurrent UTI    3. OAB (overactive bladder)           Follow Up:   Return in about 3 months (around 7/17/2025).    I spent approximately 20 minutes providing clinical care for this patient; including review of patient's chart and provider documentation, face to face time spent with patient in examination room (obtaining history, performing physical exam, discussing diagnosis and management options), placing orders, and completing patient documentation.     BENY Meza  [facility] [place] [facility]

## 2025-04-18 RX ORDER — OMEGA-3-ACID ETHYL ESTERS 1 G/1
2 CAPSULE, LIQUID FILLED ORAL 2 TIMES DAILY
Qty: 360 CAPSULE | Refills: 3 | Status: SHIPPED | OUTPATIENT
Start: 2025-04-18

## 2025-04-18 NOTE — TELEPHONE ENCOUNTER
Caller: Vladislav Gina Rossy    Relationship: Self    Best call back number: [phone]    Requested Prescriptions:   Requested Prescriptions     Pending Prescriptions Disp Refills    omega-3 acid ethyl esters (LOVAZA) 1 g capsule 360 capsule 3     Sig: Take 2 capsules by mouth 2 (Two) Times a Day.        Pharmacy where request should be sent: [facility] [address] [place] [address] [phone] [facility] [phone]      Last office visit with prescribing clinician: 12/26/2024   Last telemedicine visit with prescribing clinician: Visit date not found   Next office visit with prescribing clinician: 6/26/2025     Does the patient have less than a 3 day supply:  [x] Yes  [] No    Would you like a call back once the refill request has been completed: [x] Yes [] No    If the office needs to give you a call back, can they leave a voicemail: [x] Yes [] No    Galina Jacobson Rep   04/18/25 11:27 EDT

## 2025-04-29 ENCOUNTER — TELEPHONE (OUTPATIENT)
Dept: CARDIOLOGY | Facility: CLINIC | Age: 45
End: 2025-04-29
Payer: COMMERCIAL

## 2025-04-29 DIAGNOSIS — I10 ESSENTIAL HYPERTENSION: ICD-10-CM

## 2025-04-29 DIAGNOSIS — Z72.0 TOBACCO USE: ICD-10-CM

## 2025-04-29 DIAGNOSIS — E78.2 MIXED HYPERLIPIDEMIA: Primary | ICD-10-CM

## 2025-04-29 NOTE — TELEPHONE ENCOUNTER
Pt recently had imaging done and it showed she had plaque in her intra abdominal cavity. Pt would like to discuss. Please reach out and advise.

## 2025-04-30 NOTE — TELEPHONE ENCOUNTER
Caller: Gina Loomis    Relationship: Self    Best call back number: [phone]     What is the best time to reach you: ANY ASAP    What was the call regarding: PT HAS NOT BEEN CALLED BACK ABOUT MESSAGE LEFT ON 4/29 WITH OFFICE ABOUT plaque in her intra abdominal cavity.     LOOKING TO DISCUSS TODAY ASAP     Is it okay if the provider responds through MyChart: CALL

## 2025-05-01 NOTE — TELEPHONE ENCOUNTER
RN returned patient call, calcification was expected finding based off patient's previous testing per Dr. Naylor, is on medical therapy regarding. Patient also reporting to RN intermittent claudication, numbness and pain in legs. RN discussed with Dr. Naylor, verbal order for mary carmen   RN notified patient of order and patient in agreement

## 2025-05-02 ENCOUNTER — TELEPHONE (OUTPATIENT)
Dept: CARDIOLOGY | Facility: CLINIC | Age: 45
End: 2025-05-02
Payer: COMMERCIAL

## 2025-05-03 DIAGNOSIS — D50.9 IRON DEFICIENCY ANEMIA, UNSPECIFIED IRON DEFICIENCY ANEMIA TYPE: ICD-10-CM

## 2025-05-05 ENCOUNTER — TELEPHONE (OUTPATIENT)
Age: 45
End: 2025-05-05
Payer: COMMERCIAL

## 2025-05-05 RX ORDER — FERROUS GLUCONATE 324(38)MG
1 TABLET ORAL
Qty: 30 TABLET | Refills: 0 | Status: SHIPPED | OUTPATIENT
Start: 2025-05-05

## 2025-05-05 NOTE — TELEPHONE ENCOUNTER
Patient called in with burning when urinating, states she doesn't feel like she has a UTI because normally she has blood prior to getting one, but states she started the D-mannose, she had her  look, and her skin is red and irritated. She wants to know if that can be a side effect from the D-mannose, and what she needed to do.

## 2025-05-06 ENCOUNTER — TELEPHONE (OUTPATIENT)
Dept: CARDIOLOGY | Facility: CLINIC | Age: 45
End: 2025-05-06

## 2025-05-06 NOTE — TELEPHONE ENCOUNTER
Caller: Gina Loomis    Relationship: Self    Best call back number: [phone]     What is the best time to reach you: ANY     What was the call regarding: PER TE ON 5/2    LVM TO RESCHEDULE APPOINTMNT ON 6/26/25    HUB CAN SCHEDULE EARLIEST AVAILABLE APPOINTMENT        PT IS NOW SCHD FOR 7/14/25    Is it okay if the provider responds through MyChart: CALL

## 2025-05-07 NOTE — TELEPHONE ENCOUNTER
Hub staff attempted to follow warm transfer process and was unsuccessful     Caller: Gina Loomis    Relationship to patient: Self    Best call back number: [phone]    Patient is needing: PT CALLED TO CHECK STATUS OF MESSAGE. SHE HAD NOT HEARD BACK.  SHE WAS CHECKED FOR UTI AND DOES NOT HAVE ONE. PLEASE CALL HER TO DISCUSS. THANK YOU

## 2025-05-08 DIAGNOSIS — N39.0 RECURRENT UTI: Primary | ICD-10-CM

## 2025-05-08 RX ORDER — ADHESIVE TAPE 3"X 2.3 YD
500 TAPE, NON-MEDICATED TOPICAL DAILY
Qty: 90 EACH | Refills: 1 | Status: SHIPPED | OUTPATIENT
Start: 2025-05-08

## 2025-05-09 ENCOUNTER — OFFICE VISIT (OUTPATIENT)
Dept: PAIN MEDICINE | Facility: CLINIC | Age: 45
End: 2025-05-09
Payer: COMMERCIAL

## 2025-05-09 VITALS — HEIGHT: 64 IN | WEIGHT: 293 LBS | BODY MASS INDEX: 50.02 KG/M2

## 2025-05-09 DIAGNOSIS — N32.81 OAB (OVERACTIVE BLADDER): ICD-10-CM

## 2025-05-09 DIAGNOSIS — M53.3 SACROILIAC JOINT DYSFUNCTION OF LEFT SIDE: Primary | ICD-10-CM

## 2025-05-09 DIAGNOSIS — M54.16 LUMBAR RADICULOPATHY: ICD-10-CM

## 2025-05-09 DIAGNOSIS — M47.817 LUMBOSACRAL SPONDYLOSIS WITHOUT MYELOPATHY: ICD-10-CM

## 2025-05-09 DIAGNOSIS — M79.18 MYOFASCIAL PAIN ON RIGHT SIDE: ICD-10-CM

## 2025-05-09 DIAGNOSIS — G89.4 CHRONIC PAIN SYNDROME: ICD-10-CM

## 2025-05-09 PROCEDURE — 99213 OFFICE O/P EST LOW 20 MIN: CPT

## 2025-05-09 RX ORDER — METRONIDAZOLE 7.5 MG/G
GEL VAGINAL
COMMUNITY
Start: 2025-05-06

## 2025-05-09 RX ORDER — METHOCARBAMOL 500 MG/1
500 TABLET, FILM COATED ORAL 2 TIMES DAILY PRN
Qty: 45 TABLET | Refills: 0 | Status: SHIPPED | OUTPATIENT
Start: 2025-05-09

## 2025-05-09 NOTE — PROGRESS NOTES
"  Referring Physician: No referring provider defined for this encounter.    Primary Physician: Daniel Stratton MD    CHIEF COMPLAINT or REASON FOR VISIT: Follow-up (Post L5/S1 Lumbar Interlaminar Epidural Steroid Injection ) and Back Pain      Initial history of present illness on 04/04/2025:  Ms. Gina Loomis is 45 y.o. female who presents as a new patient referral for evaluation treatment of chronic low back pain with radiation bilateral lower extremities.  Patient has had back pain in the past but more recently developed acute low back pain radiation bilateral lower extremities after a fall.  She describes lumbosacral back pain with radiation to bilateral lower extremities.  She works with [employer].  Pain is exacerbated with activity.  Relieved with rest.  She has seen neurosurgery, Dr. Denson, who referred for nonsurgical management.  She has tried chiropractic with modest benefit.  She has tried NSAIDs, acetaminophen with modest benefit.  Patient denies any new onset bowel or bladder dysfunction, lower extremity weakness, saddle anesthesia or unexplained weight loss.       Interval history:  Patient returns to clinic today after undergoing a lumbar interlaminar epidural steroid injection.  Unfortunately, this did not provide her with any relief.  She is not particularly complaining of any back pain today.  She primarily left buttock with some degree of radiation into the left lower extremity.  Unfortunately, she did have a fall couple weeks ago where she fell over a stepstool.  She states that she injured her \"right groin.\"  This pain is present within the right posterior thigh.  She denies any other injuries secondary to this pain.  She denies hitting her head.  She denies any new headaches, vision changes, loss of consciousness.  She denies any new onset bowel or bladder dysfunction, saddle anesthesia or unilateral leg weakness.    Interventions:  4/10/2025: L5/S1 LESI minimal relief    Objective " Pain Scoring:   BRIEF PAIN INVENTORY:  Total score:   Pain Score    25 1144   PainSc: 5    PainLoc: Back      PHQ-2: 1  PHQ-9:    Opioid Risk Tool:         Review of Systems:   ROS negative except as otherwise noted     Past Medical History:   Past Medical History:   Diagnosis Date    Allergic     Anemia     Anxiety state 2010    Anxiety syndrome     Arthritis 2 4 2017    Asthma     Bell's palsy     twice    Carpal tunnel syndrome     Cervical disc disorder 9 10     Chronic bronchitis     Clotting disorder     CTS (carpal tunnel syndrome)     Depression     Diabetes mellitus     Dizziness     Fibroid     Fracture     GERD (gastroesophageal reflux disease) 2010    Headache, tension-type     HL (hearing loss)     Hyperlipidemia 2013    Hypertension     Hypothyroidism 2013    Iron deficiency anemia secondary to inadequate dietary iron intake 2023    Kidney infection     Low back pain 12 3 2025    Lumbosacral disc disease 9 10     Memory loss     Memory loss     Obesity     Osteoarthritis     Preeclampsia 2004        Shingles     Sleep apnea 05 12 12    Spinal stenosis     Stress fracture 2025    lower spine fracture + torn disc    Thoracic disc disorder 9 10     Thyroid disease     Trigeminal neuralgia     Upper respiratory infection 2018    Urinary incontinence     Vaginal infection     Weakness          Past Surgical History:   Past Surgical History:   Procedure Laterality Date    BLADDER SURGERY  2024    Scope    CARPAL TUNNEL RELEASE Left 2015    CARPAL TUNNEL RELEASE Right 08/15/2008     SECTION      X 2,  and     COLONOSCOPY N/A 2024    Procedure: COLONOSCOPY;  Surgeon: Ronald Reyes MD;  Location: [facility] ENDOSCOPY;  Service: Gastroenterology;  Laterality: N/A;    COLONOSCOPY  2024    ENDOMETRIAL ABLATION      ENDOSCOPY N/A 2024    Procedure: ESOPHAGOGASTRODUODENOSCOPY;  Surgeon: Ronald Reyes  MD Patric;  Location: [facility] ENDOSCOPY;  Service: Gastroenterology;  Laterality: N/A;    HERNIA REPAIR  2021    incisional on abdomen    HYSTEROSCOPY ENDOMETRIAL ABLATION TUBAL STERILIZATION  2020    LAPAROSCOPIC TUBAL LIGATION  2020    TUBAL ABDOMINAL LIGATION           Family History   Family History   Problem Relation Age of Onset    High cholesterol Father     Heart disease Father     Diabetes Father     Coronary artery disease Father     Kidney failure Father     Hypertension Father     Hyperlipidemia Father     Hypertension Mother     Coronary artery disease Mother     Heart disease Mother     High cholesterol Mother     Asthma Mother     COPD Mother     Diabetes Paternal Grandmother     Lymphoma Maternal Grandmother     Diabetes Maternal Grandmother     Obesity Maternal Grandmother     Diabetes Sister     Kidney cancer Neg Hx         bladder cancer         Social History   Social History     Socioeconomic History    Marital status:    Tobacco Use    Smoking status: Every Day     Current packs/day: 1.00     Average packs/day: 1.1 packs/day for 43.1 years (48.4 ttl pk-yrs)     Types: Cigarettes     Start date: 1/1/1993     Passive exposure: Current    Smokeless tobacco: Never   Vaping Use    Vaping status: Never Used   Substance and Sexual Activity    Alcohol use: Not Currently    Drug use: Never    Sexual activity: Yes     Partners: Male     Birth control/protection: Tubal ligation        Medications:     Current Outpatient Medications:     albuterol sulfate  (90 Base) MCG/ACT inhaler, Inhale 2 puffs Every 4 (Four) Hours As Needed for Wheezing., Disp: 18 g, Rfl: 5    benzonatate (TESSALON) 200 MG capsule, Take 1 capsule by mouth 2 (Two) Times a Day As Needed., Disp: , Rfl:     carvedilol (COREG) 12.5 MG tablet, Take 1 tablet by mouth 2 (Two) Times a Day., Disp: 180 tablet, Rfl: 3    Cranberry (TheraCran One) 500 MG capsule, Take 500 mg by mouth Daily., Disp: 90 each, Rfl: 1    ferrous gluconate  (FERGON) 324 MG tablet, Take 1 tablet by mouth once daily with breakfast, Disp: 30 tablet, Rfl: 0    FLUoxetine (PROzac) 20 MG capsule, Take 3 capsules by mouth Daily., Disp: 270 capsule, Rfl: 1    hydrOXYzine (ATARAX) 25 MG tablet, Take 1 tablet by mouth As Needed., Disp: , Rfl:     levothyroxine (SYNTHROID, LEVOTHROID) 50 MCG tablet, Take 1 tablet by mouth Daily., Disp: 90 tablet, Rfl: 1    losartan (COZAAR) 50 MG tablet, Take 1 tablet by mouth Daily., Disp: 90 tablet, Rfl: 1    meclizine (ANTIVERT) 25 MG tablet, Take 0.5 tablets by mouth 3 (Three) Times a Day As Needed for Dizziness., Disp: 30 tablet, Rfl: 2    metFORMIN (GLUCOPHAGE) 1000 MG tablet, Take 1 tablet by mouth 2 (Two) Times a Day With Meals., Disp: 180 tablet, Rfl: 1    metroNIDAZOLE (METROGEL) 0.75 % vaginal gel, , Disp: , Rfl:     montelukast (SINGULAIR) 10 MG tablet, Take 1 tablet by mouth Every Night., Disp: 90 tablet, Rfl: 1    NIFEdipine XL (PROCARDIA XL) 60 MG 24 hr tablet, Take 1 tablet by mouth Daily., Disp: 90 tablet, Rfl: 1    nitroglycerin (NITROSTAT) 0.4 MG SL tablet, 1 under the tongue as needed for angina, may repeat every 5 mins for up three doses.  If no resolution of chest pain, call 911 or head to the nearest emergency room., Disp: 100 tablet, Rfl: 11    norethindrone (AYGESTIN) 5 MG tablet, Take 1 tablet by mouth Daily., Disp: 30 tablet, Rfl: 0    omega-3 acid ethyl esters (LOVAZA) 1 g capsule, Take 2 capsules by mouth 2 (Two) Times a Day., Disp: 360 capsule, Rfl: 3    omeprazole (priLOSEC) 20 MG capsule, Take 1 capsule by mouth 2 (Two) Times a Day., Disp: 180 capsule, Rfl: 1    ondansetron ODT (ZOFRAN-ODT) 4 MG disintegrating tablet, 1 tablet Every 8 (Eight) Hours As Needed for Nausea or Vomiting., Disp: , Rfl:     rosuvastatin (CRESTOR) 10 MG tablet, Take 1 tablet by mouth Every Night., Disp: 90 tablet, Rfl: 3    solifenacin (VESICARE) 10 MG tablet, Take 1 tablet by mouth Daily., Disp: 60 tablet, Rfl: 1     "triamterene-hydrochlorothiazide (MAXZIDE-25) 37.5-25 MG per tablet, Take 1 tablet by mouth Daily., Disp: 90 tablet, Rfl: 1        Physical Exam:     Vitals:    05/09/25 1144   Weight: 135 kg (298 lb 8 oz)   Height: 162.6 cm (64.02\")   PainSc: 5    PainLoc: Back        General: Alert and oriented, No acute distress.   HEENT: Normocephalic, atraumatic.   Cardiovascular: No gross edema.  Obese  Respiratory: Respirations are non-labored  Pupils are round reactive and equal to light  Extraocular movements intact  Visual field intact  Lower extremity strength 5 out of 5 bilaterally    Lumbar Spine:   No masses or atrophy  Range of motion - Flexion reduced. Extension reduced.    Facet Loading: Positive bilaterally  Facet Palpation -tender bilaterally  PSIS tenderness: Positive left  Renetta finger/Gaenslen's/Dominic's/JUAREZ/Thigh thrust -positive left  Straight leg raise/slump test: Negative bilaterally  Multifidus toe-touch test:    Motor Exam:       Strength: Rate on 1-5 scale Right Left    L1/2- hip flexion 5/5  5/5    L3- knee extension 5/5  5/5    L4- ankle dorsiflexion 5/5  5/5    L5- great toe extension 5/5  5/5    S1- ankle plantarflexion 5/5  5/5    Sensory Exam: Full and equal sensation to light touch throughout.     Neurologic: Cranial Nerves II-XII are grossly intact.      Psychiatric: Cooperative.   Gait: Normal   Assistive Devices: None    Imaging Studies:   Results for orders placed during the hospital encounter of 02/12/25    MRI Lumbar Spine Without Contrast    Narrative  MRI LUMBAR SPINE WO CONTRAST    Date of Exam: 2/12/2025 5:08 PM EST    Indication: chronic lumbar pain with bilateral sciatica - patient having left-sided pain and bilateral numbness; she has been seeing chiropractor and has had x-rays in their office.    Comparison: None available.    Technique:  Routine multiplanar/multisequence sequence images of the lumbar spine were obtained without contrast administration.      Findings:  The " last well formed disc space is labeled as L5-S1.    Distal cord and conus: Normal morphology and signal. The conus medullaris terminates at L1-L2.    Cauda equina and nerve roots: Normal morphology and signal.    Alignment: The vertebral bodies appear in normal alignment.    Bones: The vertebral body heights are preserved. The bone marrow signal is within normal limits for age. No suspicious osseous lesions are demonstrated.    Description of degenerative changes at specific levels is as follows:    T12-L1: No spinal canal or neuroforaminal stenosis.    L1-2: No spinal canal or neuroforaminal stenosis.    L2-3: No spinal canal or neuroforaminal stenosis.    L3-4: Mild facet arthropathy. No spinal canal or neuroforaminal stenosis.    L4-5: Central disc protrusion and facet arthropathy. Mild canal stenosis. No foraminal stenosis.    L5-S1: Facet arthropathy and small posterior disc bulge. Mild left greater than right neural foraminal stenosis.    Extra-vertebral soft tissues: Normal.    Visualized abdomen/pelvis: Normal.    Impression  Impression:  Mild degenerative changes of the lumbar spine without high-grade canal or foraminal stenosis.        Electronically Signed: Vin Newsome MD  2/13/2025 12:01 AM EST  Workstation ID: [id number]        Independent review of radiographic imaging: Available for my interpretation of the lumbar MRI dated February 12, 2025 demonstrating L5/S1 degenerative disc disease; no high-grade canal or neuroforaminal stenosis; facet arthropathy.  Small posterior L4/L5 disc protrusion with possible annular tear.    Impression & Plan:       04/04/2025: Gina Loomis is a 45 y.o. female with past medical history significant for obesity, DM2, HLD, HTN, MDD, YUSUF, anxiety who presents to the pain clinic for evaluation and treatment of chronic back pain with ration of bilateral lower extremities.  MRI interpretation as above.  Evaluation consistent with lumbar radiculopathy, lumbar spondylosis.   We discussed epidural steroid injection to improve pain.  If greater than 50% relief for at least 2-3 months can consider repeat as needed every 3 to 4 months.  I had a discussion with the patient regarding the risks of the procedure including bleeding, infection, damage to surrounding structures.  We discussed the potential adverse effects of corticosteroid injection including flushing of the face, lipodystrophy, skin discoloration, elevated blood glucose, increased blood pressure.  Risks of frequent steroid administration include weight gain, hormonal changes, mood changes, osteoporosis.  Consider LMBB/RFA.  2025: Minimal relief from LESI.  Evaluation consistent with left sacroiliac joint dysfunction, myofascial pain of the right lower extremity.  Will plan for left SIJ.  Start Robaxin.    1. Sacroiliac joint dysfunction of left side    2. Myofascial pain on right side    3. Lumbar radiculopathy    4. Lumbosacral spondylosis without myelopathy    5. Chronic pain syndrome          PLAN:  1. Medications: Consider Voltaren topical, NSAIDs, Tylenol.  Can trial turmeric 500 mg twice daily if NSAID contraindicated.  - Start Robaxin 500 mg 1 to 2 tablets daily as needed  - May consider gabapentin/pregabalin    2. Physical Therapy: Continue PT    3. Psychological: defer    4. Complementary and alternative (CAM) Therapies: Continue chiropractic    5. Labs/Diagnostic studies: Last HbA1c was 6.8    6. Imagin. Interventions: Schedule left diagnostic and therapeutic sacroiliac joint steroid injection.   I had an in-depth discussion with the patient regarding the risk of procedure including bleeding, infection, damage to surrounding structures, paralysis.  We discussed the potential adverse effects of corticosteroid injection including flushing of the face, lipodystrophy, skin discoloration, elevated blood glucose, increased blood pressure.  Risks of frequent steroid administration includes weight gain, hormonal  changes, mood changes, and osteoporosis.   - She does have recurrent UTIs.  She is currently using metronidazole vaginal gel    8. Referrals: None indicated     9. Records:     10. Lifestyle goals:    Follow-up 2 months      [facility]  Oscar Castro PA-C          Quality Metrics:  Gina Loomis reports a pain score of 5.  Given her pain assessment as noted, treatment options were discussed and the following options were decided upon as a follow-up plan to address the patient's pain: continuation of current treatment plan for pain.                 Please note that portions of this note were completed with a voice recognition program.      Any copied data in any portion of my note from previous notes included in the HPI, PE, MDM and/or assessment and plan has been reviewed by myself and accurate as of this date.      The 21st Century Cures Act makes medical notes like this available to patients in the interest of transparency. This is a medical document intended as peer to peer communication. It is written in medical language and may contain abbreviations or verbiage that are unfamiliar. It may appear blunt or direct. Medical documents are intended to carry relevant information, facts as evident, and the clinical opinion of the practitioner.

## 2025-05-09 NOTE — TELEPHONE ENCOUNTER
Rx Refill Note  Requested Prescriptions     Pending Prescriptions Disp Refills    solifenacin (VESICARE) 10 MG tablet [Pharmacy Med Name: Solifenacin Succinate 10 MG Oral Tablet] 60 tablet 0     Sig: Take 1 tablet by mouth once daily      Last office visit with prescribing clinician: 4/17/2025   Next office visit with prescribing clinician: 7/17/2025       Josiane Collier MA  05/09/25, 08:00 EDT

## 2025-05-12 RX ORDER — SOLIFENACIN SUCCINATE 10 MG/1
10 TABLET, FILM COATED ORAL DAILY
Qty: 90 TABLET | Refills: 0 | Status: SHIPPED | OUTPATIENT
Start: 2025-05-12

## 2025-05-13 ENCOUNTER — RESULTS FOLLOW-UP (OUTPATIENT)
Dept: CARDIOLOGY | Facility: CLINIC | Age: 45
End: 2025-05-13
Payer: COMMERCIAL

## 2025-05-20 ENCOUNTER — DOCUMENTATION (OUTPATIENT)
Dept: PAIN MEDICINE | Facility: CLINIC | Age: 45
End: 2025-05-20

## 2025-05-20 ENCOUNTER — OUTSIDE FACILITY SERVICE (OUTPATIENT)
Dept: PAIN MEDICINE | Facility: CLINIC | Age: 45
End: 2025-05-20
Payer: COMMERCIAL

## 2025-05-20 PROCEDURE — 27096 INJECT SACROILIAC JOINT: CPT | Performed by: STUDENT IN AN ORGANIZED HEALTH CARE EDUCATION/TRAINING PROGRAM

## 2025-05-20 NOTE — PROGRESS NOTES
[facility]  [address] [place] [address]    PROCEDURE: Fluoroscopically-Guided Diagnostic and Therapeutic Sacroiliac Joint Injection - LEFT-      PRE-OP DIAGNOSIS: Sacroiliac joint pain  POST-OP DIAGNOSIS: Same    BLOOD THINNERS (ANTIPLATELETS/ANTICOAGULANTS): Were discussed with the patient and GANESH Guidelines were followed.    CONSENT: Risks, benefits and options were explained to the patient, all questions were answered and written informed consent was obtained.     ANESTHESIA:  Local only     PROCEDURE NOTE: The patient was placed prone with the abdomen supported by a pillow and all pressure points were padded. Standard ASA monitors were applied. A timeout protocol was performed. Proper protective gear was worn by the physician including a mask, scrub cap, and sterile gloves. The patient's low back and sacral region were prepped with chlorhexidine and draped in a sterile fashion. Fluoroscopic guidance was used to identify the inferior and posterior opening to the LEFT sacroiliac joint. Under intermittent fluoroscopic guidance, the tip of a 25-gauge, 3.5-inch spinal needle with bent tip was advanced toward the inferior aspect of the sacroiliac joint opening and advanced slightly into the joint space. Appropriate needle tip position was confirmed in the AP and lateral view. After negative aspiration, a total of 0.5 mL of Omnipaque was injected with an acceptable arthrogram outlining the sacroiliac joint and no vascular uptake. Next, a mixture containing 40mg methylprednisolone with 2cc lidocaine 1% for a total volume of 3 ml was injected without any complications.  The needles were removed and sterile bandages applied at the needle sites. The patient was transferred to the stretcher and taken to recovery in stable condition.    EBL: None  COMPLICATIONS: None    The patient tolerated the procedure well. Vital signs were stable. Sensory and motor exam was unchanged  from baseline. The patient was observed in recovery and was discharged after meeting established criteria.    FOLLOW UP: as scheduled   ADDITIONAL NOTES: [n/a]    [facility]  Thang Crockett MD     Codes:  [id number]

## 2025-05-30 ENCOUNTER — LAB (OUTPATIENT)
Dept: FAMILY MEDICINE CLINIC | Facility: CLINIC | Age: 45
End: 2025-05-30
Payer: COMMERCIAL

## 2025-05-30 ENCOUNTER — OFFICE VISIT (OUTPATIENT)
Dept: PAIN MEDICINE | Facility: CLINIC | Age: 45
End: 2025-05-30
Payer: COMMERCIAL

## 2025-05-30 VITALS — HEIGHT: 64 IN | WEIGHT: 293 LBS | BODY MASS INDEX: 50.02 KG/M2

## 2025-05-30 DIAGNOSIS — M47.817 LUMBOSACRAL SPONDYLOSIS WITHOUT MYELOPATHY: Primary | ICD-10-CM

## 2025-05-30 DIAGNOSIS — G89.4 CHRONIC PAIN SYNDROME: ICD-10-CM

## 2025-05-30 DIAGNOSIS — M54.16 LUMBAR RADICULOPATHY: Primary | ICD-10-CM

## 2025-05-30 DIAGNOSIS — Z51.81 THERAPEUTIC DRUG MONITORING: Primary | ICD-10-CM

## 2025-05-30 DIAGNOSIS — Z51.81 THERAPEUTIC DRUG MONITORING: ICD-10-CM

## 2025-05-30 DIAGNOSIS — M53.3 SACROILIAC JOINT DYSFUNCTION OF LEFT SIDE: ICD-10-CM

## 2025-05-30 DIAGNOSIS — M79.18 MYOFASCIAL PAIN ON RIGHT SIDE: ICD-10-CM

## 2025-05-30 DIAGNOSIS — M54.16 LUMBAR RADICULOPATHY: ICD-10-CM

## 2025-05-30 NOTE — PROGRESS NOTES
Referring Physician: Referring, [place] [address]    Primary Physician: Daniel Stratton MD    CHIEF COMPLAINT or REASON FOR VISIT: Follow-up and Back Pain (Right, posterior leg pain. )      Initial history of present illness on 04/04/2025:  Ms. Gina Loomis is 45 y.o. female who presents as a new patient referral for evaluation treatment of chronic low back pain with radiation bilateral lower extremities.  Patient has had back pain in the past but more recently developed acute low back pain radiation bilateral lower extremities after a fall.  She describes lumbosacral back pain with radiation to bilateral lower extremities.  She works with [employer].  Pain is exacerbated with activity.  Relieved with rest.  She has seen neurosurgery, Dr. Denson, who referred for nonsurgical management.  She has tried chiropractic with modest benefit.  She has tried NSAIDs, acetaminophen with modest benefit.  Patient denies any new onset bowel or bladder dysfunction, lower extremity weakness, saddle anesthesia or unexplained weight loss.       Interval history:  Patient returns to clinic today to discuss chronic low back pain.  She has recently undergone a left sacroiliac joint steroid injection which has provided her relief within her left back/buttock.  Today, she continues to complain of chronic right sided low back/buttock pain with radiation to right lower extremity.  Pain will radiate down into the right ankle.  She particularly notices this pain whenever sitting for prolonged periods of time.  No new injuries or events.  Patient denies any bowel or bladder dysfunction, lower extremity weakness, new onset saddle anesthesia or unexplained weight loss.       Interventions:  4/10/2025: L5/S1 LESI minimal relief  5/20/2025: Left SIJ with good relief of left back/buttock pain    Objective Pain Scoring:   BRIEF PAIN INVENTORY:  Total score:   Pain Score    05/30/25 1243   PainSc: 5     PainLoc: Back      PHQ-2: 2  PHQ-9: 5  Opioid Risk Tool:         Review of Systems:   ROS negative except as otherwise noted     Past Medical History:   Past Medical History:   Diagnosis Date    Allergic     Anemia     Anxiety state 2010    Anxiety syndrome     Arthritis 2 4 2017    Asthma     Bell's palsy     twice    Carpal tunnel syndrome     Cervical disc disorder 9 10     Chronic bronchitis     Clotting disorder     CTS (carpal tunnel syndrome)     Depression     Diabetes mellitus     Dizziness     Fibroid     Fracture     GERD (gastroesophageal reflux disease) 2010    Headache, tension-type     HL (hearing loss)     Hyperlipidemia 2013    Hypertension     Hypothyroidism 2013    Iron deficiency anemia secondary to inadequate dietary iron intake 2023    Kidney infection     Low back pain 12 3     Lumbosacral disc disease 9 10     Memory loss     Memory loss     Obesity     Osteoarthritis     Preeclampsia 2004        Shingles     Sleep apnea  12     Spinal stenosis     Stress fracture 2025    lower spine fracture + torn disc    Thoracic disc disorder 9 10     Thyroid disease     Trigeminal neuralgia     Upper respiratory infection 2018    Urinary incontinence     Vaginal infection     Weakness          Past Surgical History:   Past Surgical History:   Procedure Laterality Date    BLADDER SURGERY  2024    Scope    CARPAL TUNNEL RELEASE Left 2015    CARPAL TUNNEL RELEASE Right 08/15/2008     SECTION      X 2,  and     COLONOSCOPY N/A 2024    Procedure: COLONOSCOPY;  Surgeon: Ronald Reyes MD;  Location:  [facility];  Service: Gastroenterology;  Laterality: N/A;    COLONOSCOPY  2024    ENDOMETRIAL ABLATION      ENDOSCOPY N/A 2024    Procedure: ESOPHAGOGASTRODUODENOSCOPY;  Surgeon: Ronald Reyes MD;  Location:  [facility];  Service: Gastroenterology;  Laterality: N/A;    EPIDURAL  BLOCK      HERNIA REPAIR  2021    incisional on abdomen    HYSTEROSCOPY ENDOMETRIAL ABLATION TUBAL STERILIZATION  2020    LAPAROSCOPIC TUBAL LIGATION  2020    TUBAL ABDOMINAL LIGATION           Family History   Family History   Problem Relation Age of Onset    High cholesterol Father     Heart disease Father     Diabetes Father     Coronary artery disease Father     Kidney failure Father     Hypertension Father     Hyperlipidemia Father     Hypertension Mother     Coronary artery disease Mother     Heart disease Mother     High cholesterol Mother     Asthma Mother     COPD Mother     Diabetes Paternal Grandmother     Lymphoma Maternal Grandmother     Diabetes Maternal Grandmother     Obesity Maternal Grandmother     Diabetes Sister     Kidney cancer Neg Hx         bladder cancer         Social History   Social History     Socioeconomic History    Marital status:    Tobacco Use    Smoking status: Every Day     Current packs/day: 1.00     Average packs/day: 1.1 packs/day for 43.8 years (49.5 ttl pk-yrs)     Types: Cigarettes     Start date: 1/1/1993     Passive exposure: Current    Smokeless tobacco: Never   Vaping Use    Vaping status: Never Used   Substance and Sexual Activity    Alcohol use: Not Currently    Drug use: Never    Sexual activity: Yes     Partners: Male     Birth control/protection: Tubal ligation        Medications:     Current Outpatient Medications:     albuterol sulfate  (90 Base) MCG/ACT inhaler, Inhale 2 puffs Every 4 (Four) Hours As Needed for Wheezing., Disp: 18 g, Rfl: 5    amoxicillin-clavulanate (AUGMENTIN) 875-125 MG per tablet, Take 1 tablet every 12 hours by oral route for 10 days., Disp: , Rfl:     benzonatate (TESSALON) 200 MG capsule, Take 1 capsule by mouth 2 (Two) Times a Day As Needed., Disp: , Rfl:     carvedilol (COREG) 12.5 MG tablet, Take 1 tablet by mouth 2 (Two) Times a Day., Disp: 180 tablet, Rfl: 3    Cranberry (TheraCran One) 500 MG capsule, Take 500 mg by  mouth Daily., Disp: 90 each, Rfl: 1    ferrous gluconate (FERGON) 324 MG tablet, Take 1 tablet by mouth once daily with breakfast, Disp: 30 tablet, Rfl: 0    FLUoxetine (PROzac) 20 MG capsule, Take 3 capsules by mouth Daily., Disp: 270 capsule, Rfl: 1    hydrOXYzine (ATARAX) 25 MG tablet, Take 1 tablet by mouth As Needed., Disp: , Rfl:     levothyroxine (SYNTHROID, LEVOTHROID) 50 MCG tablet, Take 1 tablet by mouth Daily., Disp: 90 tablet, Rfl: 1    losartan (COZAAR) 50 MG tablet, Take 1 tablet by mouth Daily., Disp: 90 tablet, Rfl: 1    meclizine (ANTIVERT) 25 MG tablet, Take 0.5 tablets by mouth 3 (Three) Times a Day As Needed for Dizziness., Disp: 30 tablet, Rfl: 2    metFORMIN (GLUCOPHAGE) 1000 MG tablet, Take 1 tablet by mouth 2 (Two) Times a Day With Meals., Disp: 180 tablet, Rfl: 1    methocarbamol (ROBAXIN) 500 MG tablet, Take 1 tablet by mouth 2 (Two) Times a Day As Needed for Muscle Spasms., Disp: 45 tablet, Rfl: 0    metroNIDAZOLE (METROGEL) 0.75 % vaginal gel, , Disp: , Rfl:     montelukast (SINGULAIR) 10 MG tablet, Take 1 tablet by mouth Every Night., Disp: 90 tablet, Rfl: 1    NIFEdipine XL (PROCARDIA XL) 60 MG 24 hr tablet, Take 1 tablet by mouth Daily., Disp: 90 tablet, Rfl: 1    nitroglycerin (NITROSTAT) 0.4 MG SL tablet, 1 under the tongue as needed for angina, may repeat every 5 mins for up three doses.  If no resolution of chest pain, call 911 or head to the nearest emergency room., Disp: 100 tablet, Rfl: 11    norethindrone (AYGESTIN) 5 MG tablet, Take 1 tablet by mouth Daily., Disp: 30 tablet, Rfl: 0    omega-3 acid ethyl esters (LOVAZA) 1 g capsule, Take 2 capsules by mouth 2 (Two) Times a Day., Disp: 360 capsule, Rfl: 3    omeprazole (priLOSEC) 20 MG capsule, Take 1 capsule by mouth 2 (Two) Times a Day., Disp: 180 capsule, Rfl: 1    ondansetron ODT (ZOFRAN-ODT) 4 MG disintegrating tablet, 1 tablet Every 8 (Eight) Hours As Needed for Nausea or Vomiting., Disp: , Rfl:     rosuvastatin (CRESTOR)  "10 MG tablet, Take 1 tablet by mouth Every Night., Disp: 90 tablet, Rfl: 3    solifenacin (VESICARE) 10 MG tablet, Take 1 tablet by mouth once daily, Disp: 90 tablet, Rfl: 0    triamterene-hydrochlorothiazide (MAXZIDE-25) 37.5-25 MG per tablet, Take 1 tablet by mouth Daily., Disp: 90 tablet, Rfl: 1        Physical Exam:     Vitals:    05/30/25 1243   Weight: 135 kg (298 lb)   Height: 162.6 cm (64.02\")   PainSc: 5    PainLoc: Back        General: Alert and oriented, No acute distress.   HEENT: Normocephalic, atraumatic.   Cardiovascular: No gross edema.  Obese  Respiratory: Respirations are non-labored  Pupils are round reactive and equal to light  Extraocular movements intact  Visual field intact  Lower extremity strength 5 out of 5 bilaterally    Lumbar Spine:   No masses or atrophy  Range of motion - Flexion reduced. Extension reduced.    Facet Loading: Positive bilaterally  Facet Palpation -tender bilaterally  PSIS tenderness: Positive left  Renetta finger/Gaenslen's/Dominic's/JUAREZ/Thigh thrust -positive left  Straight leg raise/slump test: Negative bilaterally  Multifidus toe-touch test:    Right piriformis tender to palpation    Motor Exam:       Strength: Rate on 1-5 scale Right Left    L1/2- hip flexion 5/5  5/5    L3- knee extension 5/5  5/5    L4- ankle dorsiflexion 5/5  5/5    L5- great toe extension 5/5  5/5    S1- ankle plantarflexion 5/5  5/5    Sensory Exam: Full and equal sensation to light touch throughout.     Neurologic: Cranial Nerves II-XII are grossly intact.      Psychiatric: Cooperative.   Gait: Normal   Assistive Devices: None    Imaging Studies:   Results for orders placed during the hospital encounter of 02/12/25    MRI Lumbar Spine Without Contrast    Narrative  MRI LUMBAR SPINE WO CONTRAST    Date of Exam: 2/12/2025 5:08 PM EST    Indication: chronic lumbar pain with bilateral sciatica - patient having left-sided pain and bilateral numbness; she has been seeing chiropractor and has " had x-rays in their office.    Comparison: None available.    Technique:  Routine multiplanar/multisequence sequence images of the lumbar spine were obtained without contrast administration.      Findings:  The last well formed disc space is labeled as L5-S1.    Distal cord and conus: Normal morphology and signal. The conus medullaris terminates at L1-L2.    Cauda equina and nerve roots: Normal morphology and signal.    Alignment: The vertebral bodies appear in normal alignment.    Bones: The vertebral body heights are preserved. The bone marrow signal is within normal limits for age. No suspicious osseous lesions are demonstrated.    Description of degenerative changes at specific levels is as follows:    T12-L1: No spinal canal or neuroforaminal stenosis.    L1-2: No spinal canal or neuroforaminal stenosis.    L2-3: No spinal canal or neuroforaminal stenosis.    L3-4: Mild facet arthropathy. No spinal canal or neuroforaminal stenosis.    L4-5: Central disc protrusion and facet arthropathy. Mild canal stenosis. No foraminal stenosis.    L5-S1: Facet arthropathy and small posterior disc bulge. Mild left greater than right neural foraminal stenosis.    Extra-vertebral soft tissues: Normal.    Visualized abdomen/pelvis: Normal.    Impression  Impression:  Mild degenerative changes of the lumbar spine without high-grade canal or foraminal stenosis.        Electronically Signed: Vin Newsome MD  2/13/2025 12:01 AM EST  Workstation ID: [id number]        Independent review of radiographic imaging: Available for my interpretation of the lumbar MRI dated February 12, 2025 demonstrating L5/S1 degenerative disc disease; no high-grade canal or neuroforaminal stenosis; facet arthropathy.  Small posterior L4/L5 disc protrusion with possible annular tear.    Impression & Plan:       04/04/2025: Gina Loomis is a 45 y.o. female with past medical history significant for obesity, DM2, HLD, HTN, MDD, YUSUF, anxiety who presents to  the pain clinic for evaluation and treatment of chronic back pain with ration of bilateral lower extremities.  MRI interpretation as above.  Evaluation consistent with lumbar radiculopathy, lumbar spondylosis.  We discussed epidural steroid injection to improve pain.  If greater than 50% relief for at least 2-3 months can consider repeat as needed every 3 to 4 months.  I had a discussion with the patient regarding the risks of the procedure including bleeding, infection, damage to surrounding structures.  We discussed the potential adverse effects of corticosteroid injection including flushing of the face, lipodystrophy, skin discoloration, elevated blood glucose, increased blood pressure.  Risks of frequent steroid administration include weight gain, hormonal changes, mood changes, osteoporosis.  Consider LMBB/RFA.  5/9/2025: Minimal relief from LESI.  Evaluation consistent with left sacroiliac joint dysfunction, myofascial pain of the right lower extremity.  Will plan for left SIJ.  Start Robaxin.  5/30/2025: Continued right sided radicular complaints.  Will start gabapentin and recommend PT.  Differential diagnosis includes right piriformis syndrome    1. Lumbosacral spondylosis without myelopathy    2. Chronic pain syndrome    3. Lumbar radiculopathy    4. Myofascial pain on right side    5. Sacroiliac joint dysfunction of left side    6. Therapeutic drug monitoring            PLAN:  1. Medications: Consider Voltaren topical, NSAIDs, Tylenol.  Can trial turmeric 500 mg twice daily if NSAID contraindicated.  - Continue Robaxin  - Start gabapentin 300 mg twice daily, 60 pills, #1 refill  As part of this patient's treatment plan, patient will be prescribed controlled substances.  The patient has been made aware of appropriate use of such medications, including potential risk of somnolent, limited ability to drive and/or work safely, and potential for dependence or overdose.  He has been made clear that his  medications refused by this patient only, without concomitant use of alcohol or other substances as prescribed.  Controlled substance status of medication discussed with patient, discussed risk of medication including abuse potential and diversion potential and need to follow-up for reevaluation appointment in order to receive further refills.  Bryn was reviewed and compliant.     2. Physical Therapy: Continue PT    3. Psychological: defer    4. Complementary and alternative (CAM) Therapies: Continue chiropractic    5. Labs/Diagnostic studies: Last HbA1c was 6.8  - Obtain compliance UDS    6. Imagin. Interventions: May consider repeat and left SIJ  -Can consider right piriformis steroid injection      8. Referrals: None indicated     9. Records: Bryn reviewed and compliant    10. Lifestyle goals:    Follow-up 2 months      [facility]  Oscar Castro PA-C          Quality Metrics:  Gina Loomis reports a pain score of 5.  Given her pain assessment as noted, treatment options were discussed and the following options were decided upon as a follow-up plan to address the patient's pain: continuation of current treatment plan for pain.                 Please note that portions of this note were completed with a voice recognition program.      Any copied data in any portion of my note from previous notes included in the HPI, PE, MDM and/or assessment and plan has been reviewed by myself and accurate as of this date.      The 21st Century Cures Act makes medical notes like this available to patients in the interest of transparency. This is a medical document intended as peer to peer communication. It is written in medical language and may contain abbreviations or verbiage that are unfamiliar. It may appear blunt or direct. Medical documents are intended to carry relevant information, facts as evident, and the clinical opinion of the practitioner.

## 2025-05-30 NOTE — TELEPHONE ENCOUNTER
Start gabapentin  Gabapentin 300 mg twice daily, 60 pills, 1 refill  Pending UDS  Controlled substance agreement signed in office  Appropriate follow-up visit scheduled  Bryn reviewed and compliant

## 2025-06-01 DIAGNOSIS — D50.9 IRON DEFICIENCY ANEMIA, UNSPECIFIED IRON DEFICIENCY ANEMIA TYPE: ICD-10-CM

## 2025-06-02 LAB
AMPHETAMINES UR QL SCN: NEGATIVE NG/ML
BARBITURATES UR QL SCN: NEGATIVE NG/ML
BENZODIAZ UR QL SCN: NEGATIVE NG/ML
BZE UR QL SCN: NEGATIVE NG/ML
CANNABINOIDS UR QL SCN: NEGATIVE NG/ML
CREAT UR-MCNC: 31 MG/DL (ref 20–300)
LABORATORY COMMENT REPORT: NORMAL
METHADONE UR QL SCN: NEGATIVE NG/ML
OPIATES UR QL SCN: NEGATIVE NG/ML
OXYCODONE+OXYMORPHONE UR QL SCN: NEGATIVE NG/ML
PCP UR QL: NEGATIVE NG/ML
PH UR: 5.5 [PH] (ref 4.5–8.9)
PROPOXYPH UR QL SCN: NEGATIVE NG/ML

## 2025-06-02 RX ORDER — GABAPENTIN 300 MG/1
300 CAPSULE ORAL 2 TIMES DAILY
Qty: 60 CAPSULE | Refills: 1 | Status: SHIPPED | OUTPATIENT
Start: 2025-06-02

## 2025-06-02 RX ORDER — FERROUS GLUCONATE 324(38)MG
1 TABLET ORAL
Qty: 30 TABLET | Refills: 2 | Status: SHIPPED | OUTPATIENT
Start: 2025-06-02

## 2025-06-23 DIAGNOSIS — I10 ESSENTIAL HYPERTENSION: ICD-10-CM

## 2025-06-23 DIAGNOSIS — R06.09 DYSPNEA ON EXERTION: ICD-10-CM

## 2025-06-23 RX ORDER — LOSARTAN POTASSIUM 50 MG/1
50 TABLET ORAL DAILY
Qty: 90 TABLET | Refills: 1 | Status: SHIPPED | OUTPATIENT
Start: 2025-06-23

## 2025-06-23 RX ORDER — NIFEDIPINE 60 MG/1
60 TABLET, EXTENDED RELEASE ORAL DAILY
Qty: 90 TABLET | Refills: 1 | Status: SHIPPED | OUTPATIENT
Start: 2025-06-23

## 2025-06-26 ENCOUNTER — OFFICE VISIT (OUTPATIENT)
Dept: FAMILY MEDICINE CLINIC | Facility: CLINIC | Age: 45
End: 2025-06-26
Payer: COMMERCIAL

## 2025-06-26 VITALS
DIASTOLIC BLOOD PRESSURE: 64 MMHG | BODY MASS INDEX: 50.02 KG/M2 | WEIGHT: 293 LBS | SYSTOLIC BLOOD PRESSURE: 118 MMHG | HEART RATE: 79 BPM | HEIGHT: 64 IN | OXYGEN SATURATION: 96 %

## 2025-06-26 DIAGNOSIS — R82.90 ABNORMAL FINDING ON URINALYSIS: ICD-10-CM

## 2025-06-26 DIAGNOSIS — J01.10 ACUTE NON-RECURRENT FRONTAL SINUSITIS: Primary | ICD-10-CM

## 2025-06-26 DIAGNOSIS — N39.0 RECURRENT UTI: ICD-10-CM

## 2025-06-26 DIAGNOSIS — R30.0 BURNING WITH URINATION: ICD-10-CM

## 2025-06-26 DIAGNOSIS — N89.8 VAGINAL IRRITATION: ICD-10-CM

## 2025-06-26 DIAGNOSIS — L30.8 OTHER ECZEMA: ICD-10-CM

## 2025-06-26 LAB
BILIRUB BLD-MCNC: NEGATIVE MG/DL
CLARITY, POC: CLEAR
COLOR UR: YELLOW
EXPIRATION DATE: ABNORMAL
GLUCOSE UR STRIP-MCNC: ABNORMAL MG/DL
KETONES UR QL: NEGATIVE
LEUKOCYTE EST, POC: NEGATIVE
Lab: ABNORMAL
NITRITE UR-MCNC: NEGATIVE MG/ML
PH UR: 6.5 [PH] (ref 5–8)
PROT UR STRIP-MCNC: NEGATIVE MG/DL
RBC # UR STRIP: ABNORMAL /UL
SP GR UR: 1.02 (ref 1–1.03)
UROBILINOGEN UR QL: ABNORMAL

## 2025-06-26 PROCEDURE — 99214 OFFICE O/P EST MOD 30 MIN: CPT | Performed by: PHYSICIAN ASSISTANT

## 2025-06-26 PROCEDURE — 81003 URINALYSIS AUTO W/O SCOPE: CPT | Performed by: PHYSICIAN ASSISTANT

## 2025-06-26 RX ORDER — CETIRIZINE HYDROCHLORIDE 10 MG/1
10 TABLET ORAL DAILY
Qty: 30 TABLET | Refills: 0 | Status: SHIPPED | OUTPATIENT
Start: 2025-06-26

## 2025-06-26 RX ORDER — CEFDINIR 300 MG/1
300 CAPSULE ORAL 2 TIMES DAILY
Qty: 14 CAPSULE | Refills: 0 | Status: SHIPPED | OUTPATIENT
Start: 2025-06-26 | End: 2025-07-03

## 2025-06-26 NOTE — PROGRESS NOTES
.Chief Complaint  Urinary Tract Infection (Burning with urination, raw, aching in lower abd, seen at [facility] (UA and culture were negative)), Eczema, and Sinusitis (Pain across forehead and cheeks)    Subjective          History of Present Illness  Gina Loomis is here today with her  History of Present Illness  The patient presents for evaluation of recurrent urinary tract infections, bacterial vaginosis, eczema, sinus pressure, and chronic bronchitis.    She has been experiencing symptoms suggestive of a urinary tract infection (UTI) for the past 5 days. She has a history of recurrent UTIs over the past year and a half. She sought medical attention at an urgent care facility on Monday, 06/23/2025, where a urine sample revealed the presence of blood and trace white cells. A urine culture was also performed, which returned negative results. She reports irritation and a raw sensation in her genital area but does not experience any itching. She also reports soreness in her lower abdomen but does not have constipation. She has noticed an increase in urinary frequency and occasionally feels the need to strain during urination. She is currently taking cranberry supplements. She is scheduled to see a gynecologist in July 2025. She was previously on macrodantin for UTI prevention but urology discontinued it after 6 months due to protocol. She was then prescribed D-mannose, which she believes disrupted her normal dre. She was previously treated with doxycycline for a UTI.    She has been experiencing severe dry skin patches in her ears and eyebrows for the past month, a condition she had in her youth that had since resolved. She is uncertain if this recurrence is due to hormonal fluctuations. She has been applying Cortizone-10 to the affected areas.    She has been experiencing sinus pressure for over 3 weeks. She has not been expelling any nasal discharge but reports mild coughing and wheezing. She is not allergic to  "any antibiotics. She has been taking over-the-counter sinus medication, which has been effective in relieving her headaches.    She also has chronic bronchitis, which causes her to cough at night. She uses albuterol as needed and reports no recent increase in its use. She is currently on montelukast.    She has a history of bacterial vaginosis and was previously diagnosed with Ureaplasma, which was treated at an urgent care facility and by her gynecologist.    Objective   Vital Signs:   /64   Pulse 79   Ht 162.6 cm (64\")   Wt (!) 139 kg (306 lb)   SpO2 96%   BMI 52.52 kg/m²     Body mass index is 52.52 kg/m².         Review of Systems      Current Outpatient Medications:     albuterol sulfate  (90 Base) MCG/ACT inhaler, Inhale 2 puffs Every 4 (Four) Hours As Needed for Wheezing., Disp: 18 g, Rfl: 5    carvedilol (COREG) 12.5 MG tablet, Take 1 tablet by mouth 2 (Two) Times a Day., Disp: 180 tablet, Rfl: 3    Cranberry (TheraCran One) 500 MG capsule, Take 500 mg by mouth Daily., Disp: 90 each, Rfl: 1    ferrous gluconate (FERGON) 324 MG tablet, Take 1 tablet by mouth once daily with breakfast, Disp: 30 tablet, Rfl: 2    FLUoxetine (PROzac) 20 MG capsule, Take 3 capsules by mouth Daily., Disp: 270 capsule, Rfl: 1    gabapentin (NEURONTIN) 300 MG capsule, Take 1 capsule by mouth 2 (Two) Times a Day., Disp: 60 capsule, Rfl: 1    hydrOXYzine (ATARAX) 25 MG tablet, Take 1 tablet by mouth As Needed., Disp: , Rfl:     levothyroxine (SYNTHROID, LEVOTHROID) 50 MCG tablet, Take 1 tablet by mouth Daily., Disp: 90 tablet, Rfl: 1    losartan (COZAAR) 50 MG tablet, Take 1 tablet by mouth Daily., Disp: 90 tablet, Rfl: 1    meclizine (ANTIVERT) 25 MG tablet, Take 0.5 tablets by mouth 3 (Three) Times a Day As Needed for Dizziness., Disp: 30 tablet, Rfl: 2    metFORMIN (GLUCOPHAGE) 1000 MG tablet, Take 1 tablet by mouth 2 (Two) Times a Day With Meals., Disp: 180 tablet, Rfl: 1    methocarbamol (ROBAXIN) 500 MG tablet, " Take 1 tablet by mouth 2 (Two) Times a Day As Needed for Muscle Spasms., Disp: 45 tablet, Rfl: 0    metroNIDAZOLE (METROGEL) 0.75 % vaginal gel, , Disp: , Rfl:     montelukast (SINGULAIR) 10 MG tablet, Take 1 tablet by mouth Every Night., Disp: 90 tablet, Rfl: 1    NIFEdipine XL (PROCARDIA XL) 60 MG 24 hr tablet, Take 1 tablet by mouth Daily., Disp: 90 tablet, Rfl: 1    nitroglycerin (NITROSTAT) 0.4 MG SL tablet, 1 under the tongue as needed for angina, may repeat every 5 mins for up three doses.  If no resolution of chest pain, call 911 or head to the nearest emergency room., Disp: 100 tablet, Rfl: 11    norethindrone (AYGESTIN) 5 MG tablet, Take 1 tablet by mouth Daily., Disp: 30 tablet, Rfl: 0    omega-3 acid ethyl esters (LOVAZA) 1 g capsule, Take 2 capsules by mouth 2 (Two) Times a Day., Disp: 360 capsule, Rfl: 3    omeprazole (priLOSEC) 20 MG capsule, Take 1 capsule by mouth 2 (Two) Times a Day., Disp: 180 capsule, Rfl: 1    ondansetron ODT (ZOFRAN-ODT) 4 MG disintegrating tablet, 1 tablet Every 8 (Eight) Hours As Needed for Nausea or Vomiting., Disp: , Rfl:     rosuvastatin (CRESTOR) 10 MG tablet, Take 1 tablet by mouth Every Night., Disp: 90 tablet, Rfl: 3    solifenacin (VESICARE) 10 MG tablet, Take 1 tablet by mouth once daily, Disp: 90 tablet, Rfl: 0    triamterene-hydrochlorothiazide (MAXZIDE-25) 37.5-25 MG per tablet, Take 1 tablet by mouth Daily., Disp: 90 tablet, Rfl: 1    benzonatate (TESSALON) 200 MG capsule, Take 1 capsule by mouth 2 (Two) Times a Day As Needed., Disp: , Rfl:     cefdinir (OMNICEF) 300 MG capsule, Take 1 capsule by mouth 2 (Two) Times a Day for 7 days., Disp: 14 capsule, Rfl: 0    cetirizine (zyrTEC) 10 MG tablet, Take 1 tablet by mouth Daily., Disp: 30 tablet, Rfl: 0    Allergies: Lisinopril, Claritin-d 24 hour [loratadine-pseudoephedrine er], Loratadine, Morphine, and Pseudoephedrine    Physical Exam  Vitals and nursing note reviewed.   Constitutional:       General: She is not  in acute distress.     Appearance: Normal appearance. She is not ill-appearing, toxic-appearing or diaphoretic.   HENT:      Head: Normocephalic and atraumatic.      Right Ear: Tympanic membrane, ear canal and external ear normal.      Left Ear: Tympanic membrane, ear canal and external ear normal.      Nose: Congestion present.      Mouth/Throat:      Mouth: Mucous membranes are moist.      Comments: Discolored post nasal drainage  Eyes:      Pupils: Pupils are equal, round, and reactive to light.   Cardiovascular:      Rate and Rhythm: Normal rate and regular rhythm.      Heart sounds: Normal heart sounds.   Pulmonary:      Effort: Pulmonary effort is normal.      Breath sounds: Normal breath sounds.   Neurological:      General: No focal deficit present.      Mental Status: She is alert.   Psychiatric:         Mood and Affect: Mood normal.         Behavior: Behavior normal.      Physical Exam  Ears: Dry skin noted in ears.  Mouth/Throat: Postnasal drip noted.  Respiratory: Clear to auscultation, no wheezing, rales or rhonchi  Skin: Dry skin noted in ears and eyebrows.    Result Review :     UA          1/28/2025    16:30 4/17/2025    16:01 4/17/2025    16:37 6/26/2025    11:05   Urinalysis   Squamous Epithelial Cells, UA   3-6     Specific Gravity, UA   1.019     Ketones, UA Negative  Negative  Negative  Negative    Blood, UA   Negative     Leukocytes, UA Negative  Negative  Negative  Negative    Nitrite, UA   Negative     RBC, UA   0-2     WBC, UA   0-2     Bacteria, UA   None Seen          Results               Assessment and Plan    Diagnoses and all orders for this visit:    1. Acute non-recurrent frontal sinusitis (Primary)  -     cetirizine (zyrTEC) 10 MG tablet; Take 1 tablet by mouth Daily.  Dispense: 30 tablet; Refill: 0  -     cefdinir (OMNICEF) 300 MG capsule; Take 1 capsule by mouth 2 (Two) Times a Day for 7 days.  Dispense: 14 capsule; Refill: 0    2. Burning with urination  -     POC Urinalysis  Dipstick, Automated  -     Urine Culture - Urine, Urine, Clean Catch    3. Abnormal finding on urinalysis  -     Urine Culture - Urine, Urine, Clean Catch    4. Vaginal irritation  -     NuSwab VG+ - Swab, Vagina    5. Other eczema  - Reports dry skin patches in ears and eyebrows, possibly related to hormonal fluctuations or allergies.  - Physical exam findings include dry skin in affected areas.  - Advised to continue using Cortizone-10 and Vaseline for relief; antihistamine prescribed to manage symptoms.  6. Recurrent UTI    - History of recurrent UTIs, recently evaluated at urgent care with a negative urine culture.  - Diabetes may be contributing to susceptibility to UTIs.  - Symptoms include increased frequency of urination and irritation.  - A swab will be obtained for further analysis; appropriate treatment will be initiated if positive.  Assessment & Plan        Follow Up   No follow-ups on file.  Patient was given instructions and counseling regarding her condition or for health maintenance advice. Please see specific information pulled into the AVS if appropriate.     Patient or patient representative verbalized consent for the use of Ambient Listening during the visit with  SHASTA Thompson for chart documentation. 6/26/2025  16:38 EDT    SHASTA Thompson  06/26/2025

## 2025-06-28 LAB
A VAGINAE DNA VAG QL NAA+PROBE: NORMAL SCORE
BVAB2 DNA VAG QL NAA+PROBE: NORMAL SCORE
C ALBICANS DNA VAG QL NAA+PROBE: NEGATIVE
C GLABRATA DNA VAG QL NAA+PROBE: NEGATIVE
C TRACH DNA SPEC QL NAA+PROBE: NEGATIVE
MEGA1 DNA VAG QL NAA+PROBE: NORMAL SCORE
N GONORRHOEA DNA VAG QL NAA+PROBE: NEGATIVE
T VAGINALIS DNA VAG QL NAA+PROBE: NEGATIVE

## 2025-07-01 LAB
BACTERIA UR CULT: ABNORMAL
OTHER ANTIBIOTIC SUSC ISLT: ABNORMAL

## 2025-07-10 DIAGNOSIS — I10 ESSENTIAL HYPERTENSION: ICD-10-CM

## 2025-07-11 ENCOUNTER — TELEPHONE (OUTPATIENT)
Dept: FAMILY MEDICINE CLINIC | Facility: CLINIC | Age: 45
End: 2025-07-11

## 2025-07-11 RX ORDER — TRIAMTERENE AND HYDROCHLOROTHIAZIDE 37.5; 25 MG/1; MG/1
1 TABLET ORAL DAILY
Qty: 30 TABLET | Refills: 0 | Status: SHIPPED | OUTPATIENT
Start: 2025-07-11 | End: 2025-07-14

## 2025-07-11 NOTE — TELEPHONE ENCOUNTER
Caller: Vladislav Gina Blair    Relationship: Self    Best call back number: [phone]    Requested Prescriptions:   Requested Prescriptions     Pending Prescriptions Disp Refills    triamterene-hydrochlorothiazide (MAXZIDE-25) 37.5-25 MG per tablet [Pharmacy Med Name: Triamterene-HCTZ 37.5-25 MG Oral Tablet] 90 tablet 0     Sig: Take 1 tablet by mouth once daily        Pharmacy where request should be sent: [address] [phone] [place] [phone] [facility]     Last office visit with prescribing clinician: 3/27/2025   Last telemedicine visit with prescribing clinician: Visit date not found   Next office visit with prescribing clinician: Visit date not found         Galina Jaimes   07/11/25 11:18 EDT

## 2025-07-14 ENCOUNTER — OFFICE VISIT (OUTPATIENT)
Dept: CARDIOLOGY | Facility: CLINIC | Age: 45
End: 2025-07-14
Payer: COMMERCIAL

## 2025-07-14 ENCOUNTER — TELEPHONE (OUTPATIENT)
Dept: FAMILY MEDICINE CLINIC | Facility: CLINIC | Age: 45
End: 2025-07-14
Payer: COMMERCIAL

## 2025-07-14 VITALS
DIASTOLIC BLOOD PRESSURE: 60 MMHG | HEIGHT: 64 IN | OXYGEN SATURATION: 98 % | WEIGHT: 293 LBS | HEART RATE: 82 BPM | BODY MASS INDEX: 50.02 KG/M2 | SYSTOLIC BLOOD PRESSURE: 116 MMHG

## 2025-07-14 DIAGNOSIS — R06.09 DYSPNEA ON EXERTION: Primary | ICD-10-CM

## 2025-07-14 DIAGNOSIS — E78.2 MIXED HYPERLIPIDEMIA: ICD-10-CM

## 2025-07-14 DIAGNOSIS — I10 ESSENTIAL HYPERTENSION: ICD-10-CM

## 2025-07-14 DIAGNOSIS — Z72.0 TOBACCO USE: ICD-10-CM

## 2025-07-14 PROCEDURE — 99214 OFFICE O/P EST MOD 30 MIN: CPT | Performed by: INTERNAL MEDICINE

## 2025-07-14 RX ORDER — TRIAMTERENE AND HYDROCHLOROTHIAZIDE 37.5; 25 MG/1; MG/1
0.5 TABLET ORAL DAILY
Qty: 45 TABLET | Refills: 2 | Status: SHIPPED | OUTPATIENT
Start: 2025-07-14

## 2025-07-14 RX ORDER — NIFEDIPINE 60 MG/1
60 TABLET, EXTENDED RELEASE ORAL EVERY EVENING
Qty: 30 TABLET | Refills: 1 | Status: SHIPPED | OUTPATIENT
Start: 2025-07-14

## 2025-07-14 RX ORDER — ROSUVASTATIN CALCIUM 10 MG/1
10 TABLET, COATED ORAL NIGHTLY
Qty: 90 TABLET | Refills: 3 | Status: SHIPPED | OUTPATIENT
Start: 2025-07-14

## 2025-07-14 RX ORDER — CARVEDILOL 12.5 MG/1
12.5 TABLET ORAL 2 TIMES DAILY
Qty: 180 TABLET | Refills: 3 | Status: SHIPPED | OUTPATIENT
Start: 2025-07-14 | End: 2026-07-14

## 2025-07-14 RX ORDER — LOSARTAN POTASSIUM 50 MG/1
50 TABLET ORAL DAILY
Qty: 90 TABLET | Refills: 1 | Status: SHIPPED | OUTPATIENT
Start: 2025-07-14

## 2025-07-14 RX ORDER — LEVOTHYROXINE SODIUM 75 UG/1
75 TABLET ORAL
COMMUNITY

## 2025-07-14 RX ORDER — TRIAMTERENE AND HYDROCHLOROTHIAZIDE 37.5; 25 MG/1; MG/1
0.5 TABLET ORAL DAILY
Qty: 30 TABLET | Refills: 0 | Status: SHIPPED | OUTPATIENT
Start: 2025-07-14 | End: 2025-07-14 | Stop reason: SDUPTHER

## 2025-07-14 NOTE — ASSESSMENT & PLAN NOTE
Ms. Gina Loomis  reports that she has been smoking cigarettes. She started smoking about 32 years ago. She has a 49.6 pack-year smoking history. She has been exposed to tobacco smoke. She has never used smokeless tobacco..  I have educated her on the risk of diseases from using tobacco products such as cancer, COPD, heart disease, and cataracts.   I advised her to quit and she is willing to quit. We have discussed the following method/s for tobacco cessation:  Counseling.    I spent 7 minutes counseling the patient.

## 2025-07-14 NOTE — PROGRESS NOTES
[facility] [place]  [facility] CARDIOLOGY  [address] [place] [address]  Dept: [phone]  Dept Fax: [phone]    Date: 07/14/2025  Patient: iGna Loomis  YOB: 1980    Follow Up Office Visit Note    Interval Follow-up  Ms. Gina Loomis is a 45 y.o. female who is here for follow-up on No chief complaint on file..    Subjective     History of Present Illness  The patient presents for a follow-up visit.    She reports that her blood pressure was recorded as 90/50 at the urgent care. She does not monitor her blood pressure at home unless she feels unwell. She has experienced episodes of low blood pressure, which she managed by consuming two pickles, resulting in an improvement in her condition. She also mentions that her systolic blood pressure has reached 150 and her diastolic pressure has been as low as 55. She has reduced her nifedipine dosage from 90 to 60 and is currently on carvedilol 12.5 mg. She takes her diuretic in the morning and nifedipine after breakfast.    She recalls a few instances where she missed her diuretic dose, leading to leg swelling. She reports not being mindful of her salt intake. She experiences symptoms such as dizziness and a feeling of impending death when her blood pressure drops, particularly if she continues to walk or exert herself. She typically feels well in the morning but notices a change in her body after taking her medication, usually in the afternoon.    She was informed at the hospital that her thyroid could be contributing to her symptoms. She had a panic attack and hot flash when her blood pressure dropped significantly. Her levothyroxine dosage was increased from 50 to 75. She has an upcoming appointment with her primary care physician on Friday.    SOCIAL HISTORY  Exercise: Walking frequently is mentioned as beneficial for circulation.  Diet: The patient eats pop tarts in the morning and has the biggest meal in  the evening.    The following portions of the patient's history were reviewed and updated as appropriate: allergies, current medications, past family history, past medical history, past social history, past surgical history, and problem list.    Medications:   Allergies   Allergen Reactions    Lisinopril Cough    Claritin-D 24 Hour [Loratadine-Pseudoephedrine Er] Other (See Comments)     Couldn't sleep    Loratadine Anxiety    Morphine Itching    Pseudoephedrine Anxiety      Current Outpatient Medications   Medication Instructions    albuterol sulfate  (90 Base) MCG/ACT inhaler 2 puffs, Inhalation, Every 4 Hours PRN    carvedilol (COREG) 12.5 mg, Oral, 2 Times Daily    cetirizine (ZYRTEC) 10 mg, Oral, Daily    Cranberry (THERACRAN ONE) 500 mg, Oral, Daily    ferrous gluconate (FERGON) 324 mg, Oral, Daily With Breakfast    FLUoxetine (PROZAC) 60 mg, Oral, Daily    gabapentin (NEURONTIN) 300 mg, Oral, 2 Times Daily    hydrOXYzine (ATARAX) 25 mg, As Needed    levothyroxine (SYNTHROID, LEVOTHROID) 75 mcg, Every Early Morning    losartan (COZAAR) 50 mg, Oral, Daily    meclizine (ANTIVERT) 12.5 mg, Oral, 3 Times Daily PRN    metFORMIN (GLUCOPHAGE) 1,000 mg, Oral, 2 Times Daily With Meals    methocarbamol (ROBAXIN) 500 mg, Oral, 2 Times Daily PRN    montelukast (SINGULAIR) 10 mg, Oral, Nightly    NIFEdipine XL (PROCARDIA XL) 60 mg, Oral, Every Evening    nitrofurantoin (macrocrystal-monohydrate) (MACROBID) 100 mg, Oral, 2 Times Daily    nitroglycerin (NITROSTAT) 0.4 MG SL tablet 1 under the tongue as needed for angina, may repeat every 5 mins for up three doses.  If no resolution of chest pain, call 911 or head to the nearest emergency room.    norethindrone (AYGESTIN) 5 mg, Oral, Daily    omega-3 acid ethyl esters (LOVAZA) 2 g, Oral, 2 Times Daily    omeprazole (PRILOSEC) 20 mg, Oral, 2 Times Daily    ondansetron ODT (ZOFRAN-ODT) 4 mg, Every 8 Hours PRN    rosuvastatin (CRESTOR) 10 mg, Oral, Nightly    solifenacin  "(VESICARE) 10 mg, Oral, Daily    triamterene-hydrochlorothiazide (MAXZIDE-25) 37.5-25 MG per tablet 0.5 tablets, Oral, Daily       Tobacco Use: High Risk (7/14/2025)    Patient History     Smoking Tobacco Use: Every Day     Smokeless Tobacco Use: Never     Passive Exposure: Current        Objective  Vitals:    07/14/25 1446   BP: 116/60   BP Location: Right arm   Patient Position: Sitting   Cuff Size: Large Adult   Pulse: 82   SpO2: 98%   Weight: (!) 138 kg (304 lb)   Height: 162.6 cm (64\")      Vitals:    07/14/25 1446   BP: 116/60   BP Location: Right arm   Patient Position: Sitting   Cuff Size: Large Adult   Pulse: 82   SpO2: 98%   Weight: (!) 138 kg (304 lb)   Height: 162.6 cm (64\")          Physical Exam  Constitutional:       Appearance: Not in distress.   Neck:      Vascular: JVD normal.   Pulmonary:      Breath sounds: Normal breath sounds.   Cardiovascular:      Normal rate. Regular rhythm.      Murmurs: There is no murmur.      Comments: Trace bilateral lower extremities edema, non-pitting, poor circulation  Pulses:     Intact distal pulses.   Edema:     Peripheral edema present.  Neurological:      Mental Status: Alert.              Diagnostic Data  Lab Results   Component Value Date     03/27/2025    K 4.2 03/27/2025     03/27/2025    CO2 22 03/27/2025    MG 1.9 10/30/2023    BUN 14 03/27/2025    CREATININE 0.67 03/27/2025    CALCIUM 9.7 03/27/2025    BILITOT 0.3 03/27/2025    ALKPHOS 48 03/27/2025    ALT 26 03/27/2025    AST 23 03/27/2025    GLUCOSE 124 (H) 03/27/2025    ALBUMIN 4.6 03/27/2025     Lab Results   Component Value Date    WBC 8.2 03/27/2025    HGB 14.0 03/27/2025    HCT 41.4 03/27/2025     03/27/2025     No results found for: \"APTT\", \"INR\", \"PTT\"  Lab Results   Component Value Date    TROPONINT 6 10/31/2023     Lab Results   Component Value Date    PROBNP <36 04/23/2024       Lab Results   Component Value Date    CHLPL 143 03/27/2025    TRIG 196 (H) 03/27/2025    HDL 40 " 03/27/2025    LDL 70 03/27/2025     Lab Results   Component Value Date    TSH 2.480 03/27/2025    FREET4 1.27 04/28/2022       CV Diagnostics:  Procedures  CXR: Results for orders placed in visit on 10/23/24    XR Chest PA & Lateral (In Office)    Narrative  XR CHEST PA AND LATERAL    Date of Exam: 10/23/2024 2:38 PM EDT    Indication: cough, thinks aspirated food last week    Comparison: Two-view chest x-ray 12/27/2022    Findings:  Lungs are adequately expanded and appear clear. No consolidation or pleural effusion is seen. Cardiomediastinal contours appear within normal limits. There are degenerative changes in the thoracic spine.    Impression  Impression:  No acute cardiopulmonary abnormality is identified.      Electronically Signed: Yaneth Arevalo  10/24/2024 3:38 PM EDT  Workstation ID: [id number]     ECHO/MUGA: Results for orders placed during the hospital encounter of 01/26/23    Adult Transthoracic Echo Complete W/ Cont if Necessary Per Protocol    Interpretation Summary    Left ventricular systolic function is normal. Estimated left ventricular EF = 55%    Trace mitral regurgitation.    Trace tricuspid regurgitation with normal RVSP.     STRESS TESTS: Results for orders placed during the hospital encounter of 01/26/23    Stress Test With Myocardial Perfusion One Day    Interpretation Summary    Lexiscan stress test completed without complications    The test is negative for typical angina or ischemic ST segment changes.  The patient reported constant left-sided dull ache which resolved in recovery.    Diaphragmatic attenuation and GI artifacts are present.    Myocardial perfusion imaging indicates a normal myocardial perfusion study with no evidence of ischemia.    Left ventricular ejection fraction is hyperdynamic (Calculated EF > 70%).    Impressions are consistent with a low risk study.     CARDIAC CATH: No results found for this or any previous visit.     DEVICES: No valid procedures specified.    HOLTER: Results for orders placed in visit on 03/20/23    Holter Monitor - 48 Hour    Interpretation Summary    A normal monitor study.    Rare PAC`S and PVC`S    Max . Min Hr 76 and average     Complaints of skip beats correlates with single PAC`S    Heart racing and fatigue sinus rhythm and sinus tachycardia     CT/MRI:  Results for orders placed during the hospital encounter of 02/23/24    CT Angiogram Coronary    Narrative  CT ANGIOGRAM CORONARY    Date of Exam: 2/23/2024 2:28 PM EST    Indication: Chest pain/anginal equivalent, intermediate CAD risk, not treadmill candidate  chest pain continued complaints, smoker, diabetes mellitus.    Comparison: None available.    Technique: CTA of the chest was performed after the uneventful intravenous administration of 100 cc of Isovue-370 contrast. Reconstructed coronal and sagittal images were also obtained. In addition, a 3-D volume rendered image was created for  interpretation. Automated exposure control and iterative reconstruction methods were used.      Findings:  The visualized hilar and mediastinal regions are normal. The visualized pleural spaces are clear. There are bandlike linear densities in the lower lobes representing subsegmental atelectasis versus small pulmonary scars. There is degenerative spondylosis  in the visualized thoracic spine. There are no acute findings beneath the hemidiaphragms.    Impression  Impression:  No significant noncardiac CT findings.    Please refer to CT Angiogram Coronary   Cardiology interpretation report for information on cardiac structures.    Electronically Signed: Mark Murphy MD  2/25/2024 5:56 PM EST  Workstation ID: [id number]    VASCULAR: No valid procedures specified.     Assessment and Plan  Diagnoses and all orders for this visit:    1. Dyspnea on exertion (Primary)  Assessment & Plan:  Multifactorial.  Lung disease likely in the setting of smoking 2 packs/day, and exam suggestive of bronchitis.   "Pulmonary workup as per patient negative.  No volume overload on exam.  Echo normal with normal PA pressures.  Blood pressure goal as above.  Deconditioning.  Stress test and coronary CTA with no severe CAD.  Plan:  Patient counseled in regards to heart healthy, low fat/ low cholesterol/low sat diet, daily exercise for 30 minutes, low to moderate intensity, and weight loss.  Smoking cessation discussed extensively  Risk factors modification:  Blood pressure management as per hypertension  Lipid-lowering therapy as per hyperlipidemia    Orders:  -     losartan (COZAAR) 50 MG tablet; Take 1 tablet by mouth Daily.  Dispense: 90 tablet; Refill: 1    2. Mixed hyperlipidemia  Assessment & Plan:    Lipid abnormalities are improving on medical therapy.     Plan:  Continue same medication/s without change.  Rosuvastatin 10 mg p.o. daily.     Discussed medication dosage, use, side effects, and goals of treatment in detail.    Counseled patient on lifestyle modifications to help control hyperlipidemia.   Cholesterol lowering dietary information shared with patient.  Advised patient to exercise for 150 minutes weekly. (30 minute brisk walk, 5 days a week for example)  Weight Loss encouraged  Stop tobacco use encouraged  Discussed with patient extensively low carbohydrate diet.  Patient started having pop tarts in the morning as \"comfort food\" , around the same time where the lipid profile turned around.  Her kids are living home and this is making her very emotional and making her diet worse.  Discussed extensively and patient agreeable on lowering her carbohydrate diet.  Lab Results   Component Value Date    LDL 70 03/27/2025    LDL 47 04/16/2024    HDL 40 03/27/2025    HDL 35 (L) 04/16/2024    CHLPL 143 03/27/2025    CHLPL 124 04/16/2024    TRIG 196 (H) 03/27/2025    TRIG 269 (H) 04/16/2024         Patient Treatment Goals:   LDL goal is less than 70; to target     Followup in 6 months.    Orders:  -     rosuvastatin (CRESTOR) " 10 MG tablet; Take 1 tablet by mouth Every Night.  Dispense: 90 tablet; Refill: 3    3. Essential hypertension  Assessment & Plan:  Hypertension is not well-controlled, appears to be low normal, symptomatic.  Discussed with patient use of diuretic, in the setting of poor venous circulation, agreeable to decrease triamterene-hydrochlorothiazide to half a tab daily.  If blood pressure still on the lower side in 1 week, decrease nifedipine ER to 30 mg p.o. daily.  Rest of medications unchanged.  Dietary sodium restriction.  Weight loss.  Regular aerobic exercise.  Ambulatory blood pressure monitoring.  Blood pressure will be reassessedin 2 months.    Orders:  -     Discontinue: triamterene-hydrochlorothiazide (MAXZIDE-25) 37.5-25 MG per tablet; Take 0.5 tablets by mouth Daily.  Dispense: 30 tablet; Refill: 0  -     carvedilol (COREG) 12.5 MG tablet; Take 1 tablet by mouth 2 (Two) Times a Day.  Dispense: 180 tablet; Refill: 3  -     NIFEdipine XL (PROCARDIA XL) 60 MG 24 hr tablet; Take 1 tablet by mouth Every Evening.  Dispense: 30 tablet; Refill: 1  -     triamterene-hydrochlorothiazide (MAXZIDE-25) 37.5-25 MG per tablet; Take 0.5 tablets by mouth Daily.  Dispense: 45 tablet; Refill: 2    4. Tobacco use  Assessment & Plan:  Ms. Gina Loomis  reports that she has been smoking cigarettes. She started smoking about 32 years ago. She has a 49.6 pack-year smoking history. She has been exposed to tobacco smoke. She has never used smokeless tobacco..  I have educated her on the risk of diseases from using tobacco products such as cancer, COPD, heart disease, and cataracts.   I advised her to quit and she is willing to quit. We have discussed the following method/s for tobacco cessation:  Counseling.    I spent 7 minutes counseling the patient.              Return in about 2 months (around 9/14/2025) for Follow-up with Dr Naylor.    There are no Patient Instructions on file for this visit.    Patient or patient  representative verbalized consent for the use of Ambient Listening during the visit with  Trace Naylor MD for chart documentation. 7/14/2025  16:34 EDT    Trace Naylor MD

## 2025-07-14 NOTE — TELEPHONE ENCOUNTER
Incoming Refill Request      Medication requested (name and dose): METFORMIN 1000 MG    Pharmacy where request should be sent: [facility]    Additional details provided by patient: PT WILL BE OUT OF MED BEFORE APPT ON 7/18    Best call back number: [phone]    Does the patient have less than a 3 day supply:  [x] Yes  [] No    Galina Singletary Rep  07/14/25, 16:09 EDT

## 2025-07-14 NOTE — ASSESSMENT & PLAN NOTE
"  Lipid abnormalities are improving on medical therapy.     Plan:  Continue same medication/s without change.  Rosuvastatin 10 mg p.o. daily.     Discussed medication dosage, use, side effects, and goals of treatment in detail.    Counseled patient on lifestyle modifications to help control hyperlipidemia.   Cholesterol lowering dietary information shared with patient.  Advised patient to exercise for 150 minutes weekly. (30 minute brisk walk, 5 days a week for example)  Weight Loss encouraged  Stop tobacco use encouraged  Discussed with patient extensively low carbohydrate diet.  Patient started having pop tarts in the morning as \"comfort food\" , around the same time where the lipid profile turned around.  Her kids are living home and this is making her very emotional and making her diet worse.  Discussed extensively and patient agreeable on lowering her carbohydrate diet.  Lab Results   Component Value Date    LDL 70 03/27/2025    LDL 47 04/16/2024    HDL 40 03/27/2025    HDL 35 (L) 04/16/2024    CHLPL 143 03/27/2025    CHLPL 124 04/16/2024    TRIG 196 (H) 03/27/2025    TRIG 269 (H) 04/16/2024         Patient Treatment Goals:   LDL goal is less than 70; to target     Followup in 6 months.  "

## 2025-07-14 NOTE — ASSESSMENT & PLAN NOTE
Hypertension is not well-controlled, appears to be low normal, symptomatic.  Discussed with patient use of diuretic, in the setting of poor venous circulation, agreeable to decrease triamterene-hydrochlorothiazide to half a tab daily.  If blood pressure still on the lower side in 1 week, decrease nifedipine ER to 30 mg p.o. daily.  Rest of medications unchanged.  Dietary sodium restriction.  Weight loss.  Regular aerobic exercise.  Ambulatory blood pressure monitoring.  Blood pressure will be reassessedin 2 months.   Breathing spontaneous and unlabored. Breath sounds clear and equal bilaterally with regular rhythm. 15-Sep-2020

## 2025-07-14 NOTE — ASSESSMENT & PLAN NOTE
Multifactorial.  Lung disease likely in the setting of smoking 2 packs/day, and exam suggestive of bronchitis.  Pulmonary workup as per patient negative.  No volume overload on exam.  Echo normal with normal PA pressures.  Blood pressure goal as above.  Deconditioning.  Stress test and coronary CTA with no severe CAD.  Plan:  Patient counseled in regards to heart healthy, low fat/ low cholesterol/low sat diet, daily exercise for 30 minutes, low to moderate intensity, and weight loss.  Smoking cessation discussed extensively  Risk factors modification:  Blood pressure management as per hypertension  Lipid-lowering therapy as per hyperlipidemia

## 2025-07-15 ENCOUNTER — LAB (OUTPATIENT)
Dept: LAB | Facility: HOSPITAL | Age: 45
End: 2025-07-15
Payer: COMMERCIAL

## 2025-07-15 ENCOUNTER — OFFICE VISIT (OUTPATIENT)
Dept: ONCOLOGY | Facility: CLINIC | Age: 45
End: 2025-07-15
Payer: COMMERCIAL

## 2025-07-15 VITALS
HEIGHT: 64 IN | SYSTOLIC BLOOD PRESSURE: 134 MMHG | TEMPERATURE: 97.3 F | RESPIRATION RATE: 18 BRPM | OXYGEN SATURATION: 98 % | WEIGHT: 293 LBS | DIASTOLIC BLOOD PRESSURE: 82 MMHG | HEART RATE: 75 BPM | BODY MASS INDEX: 50.02 KG/M2

## 2025-07-15 DIAGNOSIS — D50.8 IRON DEFICIENCY ANEMIA SECONDARY TO INADEQUATE DIETARY IRON INTAKE: Primary | ICD-10-CM

## 2025-07-15 DIAGNOSIS — D50.8 IRON DEFICIENCY ANEMIA SECONDARY TO INADEQUATE DIETARY IRON INTAKE: ICD-10-CM

## 2025-07-15 DIAGNOSIS — E11.9 TYPE 2 DIABETES MELLITUS WITHOUT COMPLICATION, WITHOUT LONG-TERM CURRENT USE OF INSULIN: ICD-10-CM

## 2025-07-15 LAB
BASOPHILS # BLD AUTO: 0.02 10*3/MM3 (ref 0–0.2)
BASOPHILS NFR BLD AUTO: 0.2 % (ref 0–1.5)
DEPRECATED RDW RBC AUTO: 43.3 FL (ref 37–54)
EOSINOPHIL # BLD AUTO: 0.26 10*3/MM3 (ref 0–0.4)
EOSINOPHIL NFR BLD AUTO: 3 % (ref 0.3–6.2)
ERYTHROCYTE [DISTWIDTH] IN BLOOD BY AUTOMATED COUNT: 12 % (ref 12.3–15.4)
FERRITIN SERPL-MCNC: 89 NG/ML (ref 13–150)
HCT VFR BLD AUTO: 40.2 % (ref 34–46.6)
HGB BLD-MCNC: 13.2 G/DL (ref 12–15.9)
IMM GRANULOCYTES # BLD AUTO: 0.01 10*3/MM3 (ref 0–0.05)
IMM GRANULOCYTES NFR BLD AUTO: 0.1 % (ref 0–0.5)
IRON 24H UR-MRATE: 65 MCG/DL (ref 37–145)
IRON SATN MFR SERPL: 15 % (ref 20–50)
LYMPHOCYTES # BLD AUTO: 2.42 10*3/MM3 (ref 0.7–3.1)
LYMPHOCYTES NFR BLD AUTO: 28.2 % (ref 19.6–45.3)
MCH RBC QN AUTO: 31.7 PG (ref 26.6–33)
MCHC RBC AUTO-ENTMCNC: 32.8 G/DL (ref 31.5–35.7)
MCV RBC AUTO: 96.6 FL (ref 79–97)
MONOCYTES # BLD AUTO: 0.57 10*3/MM3 (ref 0.1–0.9)
MONOCYTES NFR BLD AUTO: 6.7 % (ref 5–12)
NEUTROPHILS NFR BLD AUTO: 5.29 10*3/MM3 (ref 1.7–7)
NEUTROPHILS NFR BLD AUTO: 61.8 % (ref 42.7–76)
PLATELET # BLD AUTO: 240 10*3/MM3 (ref 140–450)
PMV BLD AUTO: 8.6 FL (ref 6–12)
RBC # BLD AUTO: 4.16 10*6/MM3 (ref 3.77–5.28)
TIBC SERPL-MCNC: 432 MCG/DL (ref 298–536)
TRANSFERRIN SERPL-MCNC: 290 MG/DL (ref 200–360)
WBC NRBC COR # BLD AUTO: 8.57 10*3/MM3 (ref 3.4–10.8)

## 2025-07-15 PROCEDURE — 84466 ASSAY OF TRANSFERRIN: CPT

## 2025-07-15 PROCEDURE — 83540 ASSAY OF IRON: CPT

## 2025-07-15 PROCEDURE — 36415 COLL VENOUS BLD VENIPUNCTURE: CPT

## 2025-07-15 PROCEDURE — 85025 COMPLETE CBC W/AUTO DIFF WBC: CPT

## 2025-07-15 PROCEDURE — 99213 OFFICE O/P EST LOW 20 MIN: CPT | Performed by: NURSE PRACTITIONER

## 2025-07-15 PROCEDURE — 82728 ASSAY OF FERRITIN: CPT

## 2025-07-15 NOTE — PROGRESS NOTES
PROBLEM LIST:    1.  Iron deficiency anemia secondary to malabsorption due to chronic PPI use    REASON FOR VISIT: Iron deficiency    HISTORY OF PRESENT ILLNESS:   45 y.o.  female presents today for follow-up on iron deficiency.  She received IV iron in spring 2024.  Overall doing okay.  She was seen in [place] ED last week for chest pain, shortness of breath and feeling faint.  Work up unrevealing and she was discharged home.  She had appointment with cardiologist yesterday.  She is concerned that her blood pressure has been running low.  Blood pressure normal today at 134/82.  She has also been having frequent urinary tract infections and has appointment with urology on Thursday.      Past medical history, social history and family history was reviewed 07/15/25 and unchanged from prior visit.    Review of Systems:    Review of Systems   Constitutional: Negative.    HENT:  Negative.     Respiratory: Negative.     Cardiovascular: Negative.    Gastrointestinal: Negative.    Genitourinary:          Frequent urinary tract infections   Musculoskeletal: Negative.    Neurological: Negative.    Hematological: Negative.       Medications:      Current Outpatient Medications:     albuterol sulfate  (90 Base) MCG/ACT inhaler, Inhale 2 puffs Every 4 (Four) Hours As Needed for Wheezing., Disp: 18 g, Rfl: 5    carvedilol (COREG) 12.5 MG tablet, Take 1 tablet by mouth 2 (Two) Times a Day., Disp: 180 tablet, Rfl: 3    cetirizine (zyrTEC) 10 MG tablet, Take 1 tablet by mouth Daily., Disp: 30 tablet, Rfl: 0    Cranberry (TheraCran One) 500 MG capsule, Take 500 mg by mouth Daily., Disp: 90 each, Rfl: 1    ferrous gluconate (FERGON) 324 MG tablet, Take 1 tablet by mouth once daily with breakfast, Disp: 30 tablet, Rfl: 2    FLUoxetine (PROzac) 20 MG capsule, Take 3 capsules by mouth Daily., Disp: 270 capsule, Rfl: 1    gabapentin (NEURONTIN) 300 MG capsule, Take 1 capsule by mouth 2 (Two) Times a Day., Disp: 60 capsule, Rfl:  1    hydrOXYzine (ATARAX) 25 MG tablet, Take 1 tablet by mouth As Needed., Disp: , Rfl:     levothyroxine (SYNTHROID, LEVOTHROID) 75 MCG tablet, Take 1 tablet by mouth Every Morning., Disp: , Rfl:     losartan (COZAAR) 50 MG tablet, Take 1 tablet by mouth Daily., Disp: 90 tablet, Rfl: 1    meclizine (ANTIVERT) 25 MG tablet, Take 0.5 tablets by mouth 3 (Three) Times a Day As Needed for Dizziness., Disp: 30 tablet, Rfl: 2    methocarbamol (ROBAXIN) 500 MG tablet, Take 1 tablet by mouth 2 (Two) Times a Day As Needed for Muscle Spasms., Disp: 45 tablet, Rfl: 0    montelukast (SINGULAIR) 10 MG tablet, Take 1 tablet by mouth Every Night., Disp: 90 tablet, Rfl: 1    NIFEdipine XL (PROCARDIA XL) 60 MG 24 hr tablet, Take 1 tablet by mouth Every Evening., Disp: 30 tablet, Rfl: 1    nitrofurantoin, macrocrystal-monohydrate, (Macrobid) 100 MG capsule, Take 1 capsule by mouth 2 (Two) Times a Day., Disp: 20 capsule, Rfl: 0    nitroglycerin (NITROSTAT) 0.4 MG SL tablet, 1 under the tongue as needed for angina, may repeat every 5 mins for up three doses.  If no resolution of chest pain, call 911 or head to the nearest emergency room., Disp: 100 tablet, Rfl: 11    norethindrone (AYGESTIN) 5 MG tablet, Take 1 tablet by mouth Daily., Disp: 30 tablet, Rfl: 0    omega-3 acid ethyl esters (LOVAZA) 1 g capsule, Take 2 capsules by mouth 2 (Two) Times a Day., Disp: 360 capsule, Rfl: 3    omeprazole (priLOSEC) 20 MG capsule, Take 1 capsule by mouth 2 (Two) Times a Day., Disp: 180 capsule, Rfl: 1    ondansetron ODT (ZOFRAN-ODT) 4 MG disintegrating tablet, 1 tablet Every 8 (Eight) Hours As Needed for Nausea or Vomiting., Disp: , Rfl:     rosuvastatin (CRESTOR) 10 MG tablet, Take 1 tablet by mouth Every Night., Disp: 90 tablet, Rfl: 3    solifenacin (VESICARE) 10 MG tablet, Take 1 tablet by mouth once daily, Disp: 90 tablet, Rfl: 0    triamterene-hydrochlorothiazide (MAXZIDE-25) 37.5-25 MG per tablet, Take 0.5 tablets by mouth Daily., Disp: 45  "tablet, Rfl: 2    metFORMIN (GLUCOPHAGE) 1000 MG tablet, Take 1 tablet by mouth 2 (Two) Times a Day With Meals., Disp: 180 tablet, Rfl: 1    Pain Medications              FLUoxetine (PROzac) 20 MG capsule Take 3 capsules by mouth Daily.    gabapentin (NEURONTIN) 300 MG capsule Take 1 capsule by mouth 2 (Two) Times a Day.    methocarbamol (ROBAXIN) 500 MG tablet Take 1 tablet by mouth 2 (Two) Times a Day As Needed for Muscle Spasms.           ALLERGIES:    Allergies   Allergen Reactions    Lisinopril Cough    Claritin-D 24 Hour [Loratadine-Pseudoephedrine Er] Other (See Comments)     Couldn't sleep    Loratadine Anxiety    Morphine Itching    Pseudoephedrine Anxiety     Physical Exam    VITAL SIGNS:  /82   Pulse 75   Temp 97.3 °F (36.3 °C) (Temporal)   Resp 18   Ht 162.6 cm (64.02\")   Wt (!) 139 kg (306 lb 4.8 oz)   SpO2 98%   BMI 52.55 kg/m²     ECOG score: 0     Wt Readings from Last 3 Encounters:   07/15/25 (!) 139 kg (306 lb 4.8 oz)   07/14/25 (!) 138 kg (304 lb)   06/26/25 (!) 139 kg (306 lb)     Body mass index is 52.55 kg/m². Body surface area is 2.35 meters squared.     Performance Status: 0    General: well appearing, in no acute distress  Cardiovascular: regular rate and rhythm  Lungs: clear to auscultation bilaterally  Skin: no rashes, lesions, bruising, or petechiae  Msk:  Shows no weakness of the large muscle groups  Psych: Mood is stable    RECENT LABS:    Lab Results   Component Value Date    HGB 13.2 07/15/2025    HCT 40.2 07/15/2025    MCV 96.6 07/15/2025     07/15/2025    WBC 8.57 07/15/2025    NEUTROABS 5.29 07/15/2025    LYMPHSABS 2.42 07/15/2025    MONOSABS 0.57 07/15/2025    EOSABS 0.26 07/15/2025    BASOSABS 0.02 07/15/2025       Lab Results   Component Value Date    GLUCOSE 124 (H) 03/27/2025    BUN 14 03/27/2025    CREATININE 0.67 03/27/2025     03/27/2025    K 4.2 03/27/2025     03/27/2025    CO2 22 03/27/2025    CALCIUM 9.7 03/27/2025    PROTEINTOT 7.2 " 03/27/2025    ALBUMIN 4.6 03/27/2025    BILITOT 0.3 03/27/2025    ALKPHOS 48 03/27/2025    AST 23 03/27/2025    ALT 26 03/27/2025     Lab Results   Component Value Date    HGB 13.2 07/15/2025    HGB 14.0 03/27/2025    HGB 13.4 01/14/2025     Lab Results   Component Value Date    FERRITIN 89.00 07/15/2025    FERRITIN 87.89 01/14/2025    FERRITIN 50 06/25/2024     Lab Results   Component Value Date    MCV 96.6 07/15/2025    MCV 95 03/27/2025    MCV 91.9 01/14/2025     Lab Results   Component Value Date    TIBC 432 07/15/2025    TIBC 435 01/14/2025    TIBC 361 06/25/2024     Lab Results   Component Value Date    LABIRON 15 (L) 07/15/2025    LABIRON 24 01/14/2025    LABIRON 19 06/25/2024     Lab Results   Component Value Date    IRON 65 07/15/2025    IRON 104 01/14/2025    IRON 26 (L) 02/23/2024       Lab Results   Component Value Date    [id number] 11/20/2023       CT Abdomen Pelvis With & Without Contrast    Result Date: 5/9/2024  Impression: 1. No evidence for obstructive uropathy or renal nephrolithiasis. Electronically Signed: Jocelyn Okeefe MD  5/9/2024 9:13 AM EDT  Workstation ID: [id number]      Assessment/Plan    1.  Iron deficiency anemia secondary to malabsorption.  She received IV iron infusion April 2024.  She has been doing well since with no recurring symptoms.  I reviewed her labs from today that are adequate.  She will continue her oral iron daily as prescribed.  We will see her back in 6-month with repeat labs.  She will notify us in the interim if she develops any symptoms.  She will follow closely with cardiology with regards to her blood pressure.      BENY Lou  [facility]       Orders Placed This Encounter   Procedures    Iron Profile    Ferritin    CBC & Differential       7/15/2025     Future Appointments         Provider Department Center    7/17/2025 2:45 PM (Arrive by 2:30 PM) Heather Saenz APRN [facility] [place]    7/18/2025  1:15 PM (Arrive by 1:00 PM) Leslie Killian PA-C [facility] [place]    7/25/2025 11:30 AM Oscar Castro PA-C [facility]NAG[facility] [place]    9/22/2025 1:00 PM (Arrive by 12:30 PM) Mustapha Bah PA-C [facility] NEUROSURGERY [place]    9/22/2025 3:00 PM (Arrive by 2:45 PM) Trace Naylor MD [facility] CARDIOLOGY [place]    9/29/2025 9:45 AM (Arrive by 9:30 AM) Leslie Killian PA-C [facility] [place]    1/20/2026 2:00 PM (Arrive by 1:45 PM) Airam Cuevas APRN [facility] HEMATOLOGY & ONCOLOGY [place]

## 2025-07-17 ENCOUNTER — TELEPHONE (OUTPATIENT)
Dept: UROLOGY | Facility: CLINIC | Age: 45
End: 2025-07-17

## 2025-07-17 NOTE — TELEPHONE ENCOUNTER
Provider: PAM DESOUZA    Caller: RACHEL HOYOS    Relationship to Patient: SELF    Reason for Call: PATIENT HAD TO CANCEL HER APPT TODAY. SHE HAS HAD TWO UTI WITHIN THE LAST TWO MONTHS. CURRENTLY ON MACROBID. RESCHEDULED TO NEXT AVAIL WHICH IS OCTOBER. PATIENT ASKING IF SHE CAN BE WORKED IN SOMEPLACE SOONER. PLEASE REACH OUT IF THIS IS POSSIBLE.        When was the patient last seen: 04-17-25

## 2025-07-18 ENCOUNTER — OFFICE VISIT (OUTPATIENT)
Dept: FAMILY MEDICINE CLINIC | Facility: CLINIC | Age: 45
End: 2025-07-18
Payer: COMMERCIAL

## 2025-07-18 VITALS
BODY MASS INDEX: 50.02 KG/M2 | SYSTOLIC BLOOD PRESSURE: 124 MMHG | DIASTOLIC BLOOD PRESSURE: 72 MMHG | WEIGHT: 293 LBS | OXYGEN SATURATION: 97 % | HEIGHT: 64 IN | TEMPERATURE: 98.4 F | HEART RATE: 91 BPM

## 2025-07-18 DIAGNOSIS — E03.9 PRIMARY HYPOTHYROIDISM: Primary | ICD-10-CM

## 2025-07-18 DIAGNOSIS — F41.1 GENERALIZED ANXIETY DISORDER: ICD-10-CM

## 2025-07-18 PROCEDURE — 99214 OFFICE O/P EST MOD 30 MIN: CPT | Performed by: PHYSICIAN ASSISTANT

## 2025-07-18 RX ORDER — HYDROXYZINE HYDROCHLORIDE 25 MG/1
TABLET, FILM COATED ORAL
Qty: 20 TABLET | Refills: 0 | Status: SHIPPED | OUTPATIENT
Start: 2025-07-18

## 2025-07-21 NOTE — PROGRESS NOTES
"Chief Complaint  Establish Care (Pt is here today to establish care. She states her main concern today is her anxiety.) and Hospital Follow Up Visit ([facility] [place] - 07/10/2025.Bp dropped low and had multiple panic attacks.)    Subjective          Gina Loomis presents to [facility]  History of Present Illness  Patient in today for ER f/up. She went to [facility] [place] on 7/10/2025 for episode of anxiety/chest pain symptoms.  EKG showed NSR without acute ischemic changes noted. She states her TSH was elevated and they adjusted her dosage of levothyroxine to 75 mcg once a day and recommended to recheck in 4-6 weeks- so will return for that. Was elevated at 3.995 at ER, their normal lab range for TSH is < 3.740.  She also states her anxiety has been more of an issues past few months- currently on fluoxetine 60 mg once per day and taking hydroxyzine prn. She is interested in behavioral health consult. Denies SI/HI.   Anxiety  Visit:  Follow-up    Symptoms: nervous/anxious      Symptoms: no chest pain, no depressed mood, no dizziness, no nausea, no palpitations, no shortness of breath and no suicidal ideas        Objective   Vital Signs:   /72 (BP Location: Left arm, Patient Position: Sitting, Cuff Size: Adult)   Pulse 91   Temp 98.4 °F (36.9 °C) (Oral)   Ht 162.6 cm (64.02\")   Wt (!) 139 kg (305 lb 6.4 oz)   SpO2 97%   BMI 52.40 kg/m²     Body mass index is 52.4 kg/m².    Review of Systems   Constitutional:  Negative for fatigue and fever.   HENT:  Negative for congestion and sore throat.    Respiratory:  Negative for shortness of breath.    Cardiovascular:  Negative for chest pain and palpitations.   Gastrointestinal:  Negative for abdominal pain, diarrhea, nausea and vomiting.   Neurological:  Negative for dizziness and headache.   Psychiatric/Behavioral:  Negative for suicidal ideas and depressed mood. The patient is nervous/anxious.        Past History:  Medical History: has a past " medical history of Allergic, Anemia, Anxiety state (2010), Anxiety syndrome, Arthritis (2017), Asthma, Bell's palsy, Carpal tunnel syndrome, Cervical disc disorder (9 10 2011), Chronic bronchitis, Clotting disorder, CTS (carpal tunnel syndrome), Depression, Diabetes mellitus, Dizziness, Fibroid, Fracture, GERD (gastroesophageal reflux disease) (2010), Headache, tension-type, HL (hearing loss), Hyperlipidemia (2013), Hypertension, Hypothyroidism (2013), Iron deficiency anemia secondary to inadequate dietary iron intake (2023), Kidney infection, Low back pain (12 3 2025), Lumbosacral disc disease (9 10 2010), Memory loss, Memory loss, Obesity, Osteoarthritis, Preeclampsia (), Shingles, Sleep apnea (12), Spinal stenosis, Stress fracture (2025), Thoracic disc disorder (9 10 2011), Thyroid disease, Trigeminal neuralgia, Upper respiratory infection (2018), Urinary incontinence, Vaginal infection, and Weakness.   Surgical History: has a past surgical history that includes Carpal tunnel release (Left, 2015); Carpal tunnel release (Right, 08/15/2008);  section; Laparoscopic tubal ligation (); hysteroscopy endometrial ablation tubal sterilization (); Hernia repair (); Esophagogastroduodenoscopy (N/A, 2024); Colonoscopy (N/A, 2024); Endometrial ablation; Tubal ligation; Bladder surgery (2024); Colonoscopy (2024); and Epidural block injection.   Family History: family history includes Asthma in her mother; COPD in her mother; Coronary artery disease in her father and mother; Diabetes in her father, maternal grandmother, paternal grandmother, and sister; Heart disease in her father and mother; High cholesterol in her father and mother; Hyperlipidemia in her father; Hypertension in her father and mother; Kidney failure in her father; Lymphoma in her maternal grandmother; Obesity in her maternal grandmother.   Social History:  reports that she has been smoking cigarettes. She started smoking about 32 years ago. She has a 49.7 pack-year smoking history. She has been exposed to tobacco smoke. She has never used smokeless tobacco. She reports that she does not currently use alcohol. She reports that she does not use drugs.      Current Outpatient Medications:     albuterol sulfate  (90 Base) MCG/ACT inhaler, Inhale 2 puffs Every 4 (Four) Hours As Needed for Wheezing., Disp: 18 g, Rfl: 5    carvedilol (COREG) 12.5 MG tablet, Take 1 tablet by mouth 2 (Two) Times a Day., Disp: 180 tablet, Rfl: 3    cetirizine (zyrTEC) 10 MG tablet, Take 1 tablet by mouth Daily., Disp: 30 tablet, Rfl: 0    Cranberry (TheraCran One) 500 MG capsule, Take 500 mg by mouth Daily., Disp: 90 each, Rfl: 1    ferrous gluconate (FERGON) 324 MG tablet, Take 1 tablet by mouth once daily with breakfast, Disp: 30 tablet, Rfl: 2    FLUoxetine (PROzac) 20 MG capsule, Take 3 capsules by mouth Daily., Disp: 270 capsule, Rfl: 1    gabapentin (NEURONTIN) 300 MG capsule, Take 1 capsule by mouth 2 (Two) Times a Day., Disp: 60 capsule, Rfl: 1    hydrOXYzine (ATARAX) 25 MG tablet, Take one tablet by oral route twice a day, as needed, Disp: 20 tablet, Rfl: 0    levothyroxine (SYNTHROID, LEVOTHROID) 75 MCG tablet, Take 1 tablet by mouth Every Morning., Disp: , Rfl:     losartan (COZAAR) 50 MG tablet, Take 1 tablet by mouth Daily., Disp: 90 tablet, Rfl: 1    meclizine (ANTIVERT) 25 MG tablet, Take 0.5 tablets by mouth 3 (Three) Times a Day As Needed for Dizziness., Disp: 30 tablet, Rfl: 2    metFORMIN (GLUCOPHAGE) 1000 MG tablet, Take 1 tablet by mouth 2 (Two) Times a Day With Meals., Disp: 180 tablet, Rfl: 1    methocarbamol (ROBAXIN) 500 MG tablet, Take 1 tablet by mouth 2 (Two) Times a Day As Needed for Muscle Spasms., Disp: 45 tablet, Rfl: 0    montelukast (SINGULAIR) 10 MG tablet, Take 1 tablet by mouth Every Night., Disp: 90 tablet, Rfl: 1    NIFEdipine XL (PROCARDIA XL) 60  MG 24 hr tablet, Take 1 tablet by mouth Every Evening., Disp: 30 tablet, Rfl: 1    nitroglycerin (NITROSTAT) 0.4 MG SL tablet, 1 under the tongue as needed for angina, may repeat every 5 mins for up three doses.  If no resolution of chest pain, call 911 or head to the nearest emergency room., Disp: 100 tablet, Rfl: 11    norethindrone (AYGESTIN) 5 MG tablet, Take 1 tablet by mouth Daily., Disp: 30 tablet, Rfl: 0    omega-3 acid ethyl esters (LOVAZA) 1 g capsule, Take 2 capsules by mouth 2 (Two) Times a Day., Disp: 360 capsule, Rfl: 3    omeprazole (priLOSEC) 20 MG capsule, Take 1 capsule by mouth 2 (Two) Times a Day., Disp: 180 capsule, Rfl: 1    ondansetron ODT (ZOFRAN-ODT) 4 MG disintegrating tablet, 1 tablet Every 8 (Eight) Hours As Needed for Nausea or Vomiting., Disp: , Rfl:     rosuvastatin (CRESTOR) 10 MG tablet, Take 1 tablet by mouth Every Night., Disp: 90 tablet, Rfl: 3    solifenacin (VESICARE) 10 MG tablet, Take 1 tablet by mouth once daily, Disp: 90 tablet, Rfl: 0    triamterene-hydrochlorothiazide (MAXZIDE-25) 37.5-25 MG per tablet, Take 0.5 tablets by mouth Daily., Disp: 45 tablet, Rfl: 2  Allergies: Lisinopril, Claritin-d 24 hour [loratadine-pseudoephedrine er], Loratadine, Morphine, and Pseudoephedrine    Physical Exam  Constitutional:       Appearance: Normal appearance.   HENT:      Right Ear: Tympanic membrane normal.      Left Ear: Tympanic membrane normal.      Mouth/Throat:      Pharynx: Oropharynx is clear.   Eyes:      Conjunctiva/sclera: Conjunctivae normal.      Pupils: Pupils are equal, round, and reactive to light.   Cardiovascular:      Rate and Rhythm: Normal rate and regular rhythm.      Heart sounds: Normal heart sounds.   Pulmonary:      Effort: Pulmonary effort is normal.      Breath sounds: Normal breath sounds.   Neurological:      Mental Status: She is oriented to person, place, and time.   Psychiatric:         Mood and Affect: Mood normal.         Behavior: Behavior normal.              Assessment and Plan   Diagnoses and all orders for this visit:    1. Primary hypothyroidism (Primary)  Will rtc to have TSH level rechecked in 3-4 weeks. Continue on current dosage.   2. Generalized anxiety disorder  -     Ambulatory Referral to Behavioral Health  Will place referral for behavioral health. She states the 50 mg hydroxyzine that she has makes her feel too groggy- would prefer prescription for 25 mg tablet to take prn ; rtc or to ER for any acute worsening of symptoms if needed   Other orders  -     hydrOXYzine (ATARAX) 25 MG tablet; Take one tablet by oral route twice a day, as needed  Dispense: 20 tablet; Refill: 0            Follow Up   No follow-ups on file.  Patient was given instructions and counseling regarding her condition or for health maintenance advice. Please see specific information pulled into the AVS if appropriate.     Leslie Killian PA-C

## 2025-07-22 ENCOUNTER — TELEPHONE (OUTPATIENT)
Dept: FAMILY MEDICINE CLINIC | Facility: CLINIC | Age: 45
End: 2025-07-22
Payer: COMMERCIAL

## 2025-07-23 ENCOUNTER — OFFICE VISIT (OUTPATIENT)
Dept: FAMILY MEDICINE CLINIC | Facility: CLINIC | Age: 45
End: 2025-07-23
Payer: COMMERCIAL

## 2025-07-23 ENCOUNTER — TELEPHONE (OUTPATIENT)
Dept: FAMILY MEDICINE CLINIC | Facility: CLINIC | Age: 45
End: 2025-07-23

## 2025-07-23 VITALS
BODY MASS INDEX: 50.02 KG/M2 | HEIGHT: 64 IN | TEMPERATURE: 98.5 F | SYSTOLIC BLOOD PRESSURE: 120 MMHG | WEIGHT: 293 LBS | HEART RATE: 84 BPM | OXYGEN SATURATION: 96 % | DIASTOLIC BLOOD PRESSURE: 64 MMHG

## 2025-07-23 DIAGNOSIS — R35.0 FREQUENCY OF URINATION: Primary | ICD-10-CM

## 2025-07-23 LAB
BILIRUB BLD-MCNC: NEGATIVE MG/DL
CLARITY, POC: CLEAR
COLOR UR: YELLOW
GLUCOSE UR STRIP-MCNC: ABNORMAL MG/DL
KETONES UR QL: NEGATIVE
LEUKOCYTE EST, POC: ABNORMAL
NITRITE UR-MCNC: NEGATIVE MG/ML
PH UR: 6 [PH] (ref 5–8)
PROT UR STRIP-MCNC: NEGATIVE MG/DL
RBC # UR STRIP: ABNORMAL /UL
SP GR UR: 1.01 (ref 1–1.03)
UROBILINOGEN UR QL: NORMAL

## 2025-07-23 PROCEDURE — 81002 URINALYSIS NONAUTO W/O SCOPE: CPT | Performed by: PHYSICIAN ASSISTANT

## 2025-07-23 PROCEDURE — 99214 OFFICE O/P EST MOD 30 MIN: CPT | Performed by: PHYSICIAN ASSISTANT

## 2025-07-23 RX ORDER — CEFDINIR 300 MG/1
300 CAPSULE ORAL 2 TIMES DAILY
Qty: 14 CAPSULE | Refills: 0 | Status: SHIPPED | OUTPATIENT
Start: 2025-07-23

## 2025-07-23 RX ORDER — FLUCONAZOLE 150 MG/1
TABLET ORAL
Qty: 2 TABLET | Refills: 0 | Status: SHIPPED | OUTPATIENT
Start: 2025-07-23

## 2025-07-23 NOTE — PROGRESS NOTES
"Chief Complaint  Urinary Frequency (Burning with urination and frequency started a couple days ago )    Subjective          Gina Loomis presents to [facility]  History of Present Illness  Patient in today for evaluation of return of UTI symptoms past 3-4 days after getting off macrobid. She does have an appointment with her urologist next week for recurrent UTIs. States started with some mild dysuria and frequency symptoms again about 3-4 days ago. Denies fever. Denies vomiting or diarrhea .   Urinary Frequency  Chronicity:  New  Onset:  In the past 7 days  Fever:  No fever  Associated symptoms: frequency and urgency    Associated symptoms: no chills, no flank pain, no hematuria, no nausea and no vomiting        Objective   Vital Signs:   /64   Pulse 84   Temp 98.5 °F (36.9 °C)   Ht 162.6 cm (64\")   Wt (!) 139 kg (306 lb)   SpO2 96%   BMI 52.52 kg/m²     Body mass index is 52.52 kg/m².    Review of Systems   Constitutional:  Negative for chills and fever.   Gastrointestinal:  Negative for diarrhea, nausea and vomiting.   Genitourinary:  Positive for dysuria, frequency and urgency. Negative for flank pain and hematuria.   Neurological:  Negative for dizziness.       Past History:  Medical History: has a past medical history of Allergic, Anemia, Anxiety state (02/05/2010), Anxiety syndrome, Arthritis (2 4 2017), Asthma, Bell's palsy, Carpal tunnel syndrome, Cervical disc disorder (9 10 2011), Chronic bronchitis, Clotting disorder, CTS (carpal tunnel syndrome), Depression, Diabetes mellitus, Dizziness, Fibroid, Fracture, GERD (gastroesophageal reflux disease) (02/05/2010), Headache, tension-type, HL (hearing loss), Hyperlipidemia (01/31/2013), Hypertension, Hypothyroidism (01/31/2013), Iron deficiency anemia secondary to inadequate dietary iron intake (12/08/2023), Kidney infection, Low back pain (12 3 2025), Lumbosacral disc disease (9 10 2010), Memory loss, Memory " loss, Obesity, Osteoarthritis, Preeclampsia (), Shingles, Sleep apnea (12), Spinal stenosis, Stress fracture (2025), Thoracic disc disorder (9 10 2011), Thyroid disease, Trigeminal neuralgia, Upper respiratory infection (2018), Urinary incontinence, Vaginal infection, and Weakness.   Surgical History: has a past surgical history that includes Carpal tunnel release (Left, 2015); Carpal tunnel release (Right, 08/15/2008);  section; Laparoscopic tubal ligation (); hysteroscopy endometrial ablation tubal sterilization (); Hernia repair (); Esophagogastroduodenoscopy (N/A, 2024); Colonoscopy (N/A, 2024); Endometrial ablation; Tubal ligation; Bladder surgery (2024); Colonoscopy (2024); and Epidural block injection.   Family History: family history includes Asthma in her mother; COPD in her mother; Coronary artery disease in her father and mother; Diabetes in her father, maternal grandmother, paternal grandmother, and sister; Heart disease in her father and mother; High cholesterol in her father and mother; Hyperlipidemia in her father; Hypertension in her father and mother; Kidney failure in her father; Lymphoma in her maternal grandmother; Obesity in her maternal grandmother.   Social History: reports that she has been smoking cigarettes. She started smoking about 32 years ago. She has a 49.7 pack-year smoking history. She has been exposed to tobacco smoke. She has never used smokeless tobacco. She reports that she does not currently use alcohol. She reports that she does not use drugs.      Current Outpatient Medications:     albuterol sulfate  (90 Base) MCG/ACT inhaler, Inhale 2 puffs Every 4 (Four) Hours As Needed for Wheezing., Disp: 18 g, Rfl: 5    carvedilol (COREG) 12.5 MG tablet, Take 1 tablet by mouth 2 (Two) Times a Day., Disp: 180 tablet, Rfl: 3    Cranberry (TheraCran One) 500 MG capsule, Take 500 mg by mouth Daily., Disp: 90 each,  Rfl: 1    ferrous gluconate (FERGON) 324 MG tablet, Take 1 tablet by mouth once daily with breakfast, Disp: 30 tablet, Rfl: 2    FLUoxetine (PROzac) 20 MG capsule, Take 3 capsules by mouth Daily., Disp: 270 capsule, Rfl: 1    gabapentin (NEURONTIN) 300 MG capsule, Take 1 capsule by mouth 2 (Two) Times a Day., Disp: 60 capsule, Rfl: 1    hydrOXYzine (ATARAX) 25 MG tablet, Take one tablet by oral route twice a day, as needed, Disp: 20 tablet, Rfl: 0    levothyroxine (SYNTHROID, LEVOTHROID) 75 MCG tablet, Take 1 tablet by mouth Every Morning., Disp: , Rfl:     losartan (COZAAR) 50 MG tablet, Take 1 tablet by mouth Daily., Disp: 90 tablet, Rfl: 1    meclizine (ANTIVERT) 25 MG tablet, Take 0.5 tablets by mouth 3 (Three) Times a Day As Needed for Dizziness., Disp: 30 tablet, Rfl: 2    metFORMIN (GLUCOPHAGE) 1000 MG tablet, Take 1 tablet by mouth 2 (Two) Times a Day With Meals., Disp: 180 tablet, Rfl: 1    methocarbamol (ROBAXIN) 500 MG tablet, Take 1 tablet by mouth 2 (Two) Times a Day As Needed for Muscle Spasms., Disp: 45 tablet, Rfl: 0    montelukast (SINGULAIR) 10 MG tablet, Take 1 tablet by mouth Every Night., Disp: 90 tablet, Rfl: 1    NIFEdipine XL (PROCARDIA XL) 60 MG 24 hr tablet, Take 1 tablet by mouth Every Evening., Disp: 30 tablet, Rfl: 1    nitroglycerin (NITROSTAT) 0.4 MG SL tablet, 1 under the tongue as needed for angina, may repeat every 5 mins for up three doses.  If no resolution of chest pain, call 911 or head to the nearest emergency room., Disp: 100 tablet, Rfl: 11    norethindrone (AYGESTIN) 5 MG tablet, Take 1 tablet by mouth Daily., Disp: 30 tablet, Rfl: 0    omega-3 acid ethyl esters (LOVAZA) 1 g capsule, Take 2 capsules by mouth 2 (Two) Times a Day., Disp: 360 capsule, Rfl: 3    omeprazole (priLOSEC) 20 MG capsule, Take 1 capsule by mouth 2 (Two) Times a Day., Disp: 180 capsule, Rfl: 1    ondansetron ODT (ZOFRAN-ODT) 4 MG disintegrating tablet, 1 tablet Every 8 (Eight) Hours As Needed for Nausea  or Vomiting., Disp: , Rfl:     rosuvastatin (CRESTOR) 10 MG tablet, Take 1 tablet by mouth Every Night., Disp: 90 tablet, Rfl: 3    solifenacin (VESICARE) 10 MG tablet, Take 1 tablet by mouth once daily, Disp: 90 tablet, Rfl: 0    triamterene-hydrochlorothiazide (MAXZIDE-25) 37.5-25 MG per tablet, Take 0.5 tablets by mouth Daily., Disp: 45 tablet, Rfl: 2    cefdinir (OMNICEF) 300 MG capsule, Take 1 capsule by mouth 2 (Two) Times a Day., Disp: 14 capsule, Rfl: 0    fluconazole (Diflucan) 150 MG tablet, Take one tablet by oral route QD x 1; may repeat in one week if needed, Disp: 2 tablet, Rfl: 0  Allergies: Lisinopril, Claritin-d 24 hour [loratadine-pseudoephedrine er], Loratadine, Morphine, and Pseudoephedrine    Physical Exam  Constitutional:       Appearance: Normal appearance.   HENT:      Right Ear: Tympanic membrane normal.   Cardiovascular:      Rate and Rhythm: Normal rate and regular rhythm.      Heart sounds: Normal heart sounds.   Pulmonary:      Breath sounds: Normal breath sounds.   Abdominal:      Palpations: Abdomen is soft.      Tenderness: There is no abdominal tenderness. There is no right CVA tenderness or left CVA tenderness.   Neurological:      Mental Status: She is alert and oriented to person, place, and time.   Psychiatric:         Mood and Affect: Mood normal.         Behavior: Behavior normal.             Assessment and Plan   Diagnoses and all orders for this visit:    1. Frequency of urination (Primary)  -     POC Urinalysis Dipstick  -     Urine Culture - Urine, Urine, Clean Catch  With symptoms, will start on cefdinir until culture has returned; she states often gets yeast infection so requesting diflucan-states has tolerated well in past;discussed possible side effects of medications;  encourage good hydration and keep f/up with urology as directed; rtc or to ER for any acute worsening of symptoms if needed prior to urology evaluation   Other orders  -     cefdinir (OMNICEF) 300 MG  capsule; Take 1 capsule by mouth 2 (Two) Times a Day.  Dispense: 14 capsule; Refill: 0            Follow Up   No follow-ups on file.  Patient was given instructions and counseling regarding her condition or for health maintenance advice. Please see specific information pulled into the AVS if appropriate.     Leslie Killian PA-C

## 2025-07-29 ENCOUNTER — TELEPHONE (OUTPATIENT)
Dept: FAMILY MEDICINE CLINIC | Facility: CLINIC | Age: 45
End: 2025-07-29
Payer: COMMERCIAL

## 2025-07-29 LAB
BACTERIA UR CULT: ABNORMAL
OTHER ANTIBIOTIC SUSC ISLT: ABNORMAL

## 2025-07-30 ENCOUNTER — OFFICE VISIT (OUTPATIENT)
Dept: UROLOGY | Facility: CLINIC | Age: 45
End: 2025-07-30
Payer: COMMERCIAL

## 2025-07-30 VITALS — DIASTOLIC BLOOD PRESSURE: 74 MMHG | OXYGEN SATURATION: 98 % | HEART RATE: 84 BPM | SYSTOLIC BLOOD PRESSURE: 112 MMHG

## 2025-07-30 DIAGNOSIS — R31.9 URINARY TRACT INFECTION WITH HEMATURIA, SITE UNSPECIFIED: ICD-10-CM

## 2025-07-30 DIAGNOSIS — N39.0 RECURRENT UTI: Primary | ICD-10-CM

## 2025-07-30 DIAGNOSIS — R31.29 MICROSCOPIC HEMATURIA: ICD-10-CM

## 2025-07-30 DIAGNOSIS — N39.0 URINARY TRACT INFECTION WITH HEMATURIA, SITE UNSPECIFIED: ICD-10-CM

## 2025-07-30 PROCEDURE — 87086 URINE CULTURE/COLONY COUNT: CPT | Performed by: NURSE PRACTITIONER

## 2025-07-30 PROCEDURE — 99214 OFFICE O/P EST MOD 30 MIN: CPT | Performed by: NURSE PRACTITIONER

## 2025-07-30 PROCEDURE — 81003 URINALYSIS AUTO W/O SCOPE: CPT | Performed by: NURSE PRACTITIONER

## 2025-07-30 PROCEDURE — 81001 URINALYSIS AUTO W/SCOPE: CPT | Performed by: NURSE PRACTITIONER

## 2025-07-30 RX ORDER — NITROFURANTOIN 25; 75 MG/1; MG/1
100 CAPSULE ORAL NIGHTLY
Qty: 90 CAPSULE | Refills: 1 | Status: SHIPPED | OUTPATIENT
Start: 2025-07-30

## 2025-07-30 NOTE — PROGRESS NOTES
UTI Office Visit      Patient Name: Gina Loomis  : 1980   MRN: [id number]     Chief Complaint:  UTI   Chief Complaint   Patient presents with    Recurrent UTI    Overactive bladder     Blood in Urine     Microscopic        Referring Provider: No ref. provider found    History of Present Illness: Vladislav is a 45 y.o.  who presents today for ***    Primary symptoms include: ***    Onset was ***.    Previous treatments include: ***    Subjective      Review of System: Review of Systems   I have reviewed the ROS documented by my clinical staff, I have updated appropriately and I agree. Mane Maloney    Past Medical History:  Past Medical History:   Diagnosis Date    Allergic     Anemia     Anxiety state 2010    Anxiety syndrome     Arthritis 2 4 2017    Asthma     Bell's palsy     twice    Carpal tunnel syndrome     Cervical disc disorder 9 10     Chronic bronchitis     Clotting disorder     CTS (carpal tunnel syndrome)     Depression     Diabetes mellitus     Dizziness     Fibroid     Fracture     GERD (gastroesophageal reflux disease) 2010    Headache, tension-type     HL (hearing loss)     Hyperlipidemia 2013    Hypertension     Hypothyroidism 2013    Iron deficiency anemia secondary to inadequate dietary iron intake 2023    Kidney infection     Low back pain 12 3 2025    Lumbosacral disc disease 9 10     Memory loss     Memory loss     Obesity     Osteoarthritis     Preeclampsia 2004        Shingles     Sleep apnea 05 12 12    Spinal stenosis     Stress fracture 2025    lower spine fracture + torn disc    Thoracic disc disorder 9 10     Thyroid disease     Trigeminal neuralgia     Upper respiratory infection 2018    Urinary incontinence     Vaginal infection     Weakness        Past Surgical History:  Past Surgical History:   Procedure Laterality Date    BLADDER SURGERY  2024    Scope    CARPAL TUNNEL RELEASE Left 2015     CARPAL TUNNEL RELEASE Right 08/15/2008     SECTION      X 2,  and     COLONOSCOPY N/A 2024    Procedure: COLONOSCOPY;  Surgeon: Ronald Reyes MD;  Location:  [facility];  Service: Gastroenterology;  Laterality: N/A;    COLONOSCOPY  2024    ENDOMETRIAL ABLATION      ENDOSCOPY N/A 2024    Procedure: ESOPHAGOGASTRODUODENOSCOPY;  Surgeon: Ronald Reyes MD;  Location:  [facility];  Service: Gastroenterology;  Laterality: N/A;    EPIDURAL BLOCK      HERNIA REPAIR      incisional on abdomen    HYSTEROSCOPY ENDOMETRIAL ABLATION TUBAL STERILIZATION      LAPAROSCOPIC TUBAL LIGATION      TUBAL ABDOMINAL LIGATION         Medications:    Current Outpatient Medications:     albuterol sulfate  (90 Base) MCG/ACT inhaler, Inhale 2 puffs Every 4 (Four) Hours As Needed for Wheezing., Disp: 18 g, Rfl: 5    carvedilol (COREG) 12.5 MG tablet, Take 1 tablet by mouth 2 (Two) Times a Day., Disp: 180 tablet, Rfl: 3    cefdinir (OMNICEF) 300 MG capsule, Take 1 capsule by mouth 2 (Two) Times a Day., Disp: 14 capsule, Rfl: 0    Cranberry (TheraCran One) 500 MG capsule, Take 500 mg by mouth Daily., Disp: 90 each, Rfl: 1    ferrous gluconate (FERGON) 324 MG tablet, Take 1 tablet by mouth once daily with breakfast, Disp: 30 tablet, Rfl: 2    fluconazole (Diflucan) 150 MG tablet, Take one tablet by oral route QD x 1; may repeat in one week if needed, Disp: 2 tablet, Rfl: 0    FLUoxetine (PROzac) 20 MG capsule, Take 3 capsules by mouth Daily., Disp: 270 capsule, Rfl: 1    gabapentin (NEURONTIN) 300 MG capsule, Take 1 capsule by mouth 2 (Two) Times a Day., Disp: 60 capsule, Rfl: 1    hydrOXYzine (ATARAX) 25 MG tablet, Take one tablet by oral route twice a day, as needed, Disp: 20 tablet, Rfl: 0    levothyroxine (SYNTHROID, LEVOTHROID) 75 MCG tablet, Take 1 tablet by mouth Every Morning., Disp: , Rfl:     losartan (COZAAR) 50 MG tablet, Take 1 tablet by mouth Daily., Disp: 90  tablet, Rfl: 1    meclizine (ANTIVERT) 25 MG tablet, Take 0.5 tablets by mouth 3 (Three) Times a Day As Needed for Dizziness., Disp: 30 tablet, Rfl: 2    metFORMIN (GLUCOPHAGE) 1000 MG tablet, Take 1 tablet by mouth 2 (Two) Times a Day With Meals., Disp: 180 tablet, Rfl: 1    methocarbamol (ROBAXIN) 500 MG tablet, Take 1 tablet by mouth 2 (Two) Times a Day As Needed for Muscle Spasms., Disp: 45 tablet, Rfl: 0    montelukast (SINGULAIR) 10 MG tablet, Take 1 tablet by mouth Every Night., Disp: 90 tablet, Rfl: 1    NIFEdipine XL (PROCARDIA XL) 60 MG 24 hr tablet, Take 1 tablet by mouth Every Evening., Disp: 30 tablet, Rfl: 1    nitroglycerin (NITROSTAT) 0.4 MG SL tablet, 1 under the tongue as needed for angina, may repeat every 5 mins for up three doses.  If no resolution of chest pain, call 911 or head to the nearest emergency room., Disp: 100 tablet, Rfl: 11    norethindrone (AYGESTIN) 5 MG tablet, Take 1 tablet by mouth Daily., Disp: 30 tablet, Rfl: 0    omega-3 acid ethyl esters (LOVAZA) 1 g capsule, Take 2 capsules by mouth 2 (Two) Times a Day., Disp: 360 capsule, Rfl: 3    omeprazole (priLOSEC) 20 MG capsule, Take 1 capsule by mouth 2 (Two) Times a Day., Disp: 180 capsule, Rfl: 1    ondansetron ODT (ZOFRAN-ODT) 4 MG disintegrating tablet, 1 tablet Every 8 (Eight) Hours As Needed for Nausea or Vomiting., Disp: , Rfl:     rosuvastatin (CRESTOR) 10 MG tablet, Take 1 tablet by mouth Every Night., Disp: 90 tablet, Rfl: 3    solifenacin (VESICARE) 10 MG tablet, Take 1 tablet by mouth once daily, Disp: 90 tablet, Rfl: 0    triamterene-hydrochlorothiazide (MAXZIDE-25) 37.5-25 MG per tablet, Take 0.5 tablets by mouth Daily., Disp: 45 tablet, Rfl: 2    Allergies:  Allergies   Allergen Reactions    Lisinopril Cough    Claritin-D 24 Hour [Loratadine-Pseudoephedrine Er] Other (See Comments)     Couldn't sleep    Loratadine Anxiety    Morphine Itching    Pseudoephedrine Anxiety       Social History:  Social History      Socioeconomic History    Marital status:    Tobacco Use    Smoking status: Every Day     Current packs/day: 1.00     Average packs/day: 1.1 packs/day for 44.0 years (49.7 ttl pk-yrs)     Types: Cigarettes     Start date: 1/1/1993     Passive exposure: Current    Smokeless tobacco: Never   Vaping Use    Vaping status: Never Used   Substance and Sexual Activity    Alcohol use: Not Currently    Drug use: Never    Sexual activity: Yes     Partners: Male     Birth control/protection: Tubal ligation       Family History:  Family History   Problem Relation Age of Onset    High cholesterol Father     Heart disease Father     Diabetes Father     Coronary artery disease Father     Kidney failure Father     Hypertension Father     Hyperlipidemia Father     Hypertension Mother     Coronary artery disease Mother     Heart disease Mother     High cholesterol Mother     Asthma Mother     COPD Mother     Diabetes Paternal Grandmother     Lymphoma Maternal Grandmother     Diabetes Maternal Grandmother     Obesity Maternal Grandmother     Diabetes Sister     Kidney cancer Neg Hx         bladder cancer     Bladder & Bowel Symptom Questionnaire    How often do you usually urinate during the day ?   3 - About every 1-2 hours   2.   How many timed do you urinate at night?   1 - 2 times at night   3.   What is the reason that you usually urinate?   3 - Severe urge (can delay less than 10 min)   4.   Once you get the urge to go, how long can you     comfortably delay?   2 - 10-30 min   5.   How often do you get a sudden urge that makes you rush to the bathroom?   2 - A few times a month   6.   How often does a sudden urge to urinate result in you leaking urine or wetting pads?   2 - A few times a month   7.  In your opinion, how good is your bladder control?   2 - Good   8.  Do you have accidental bowel leakage?   no   9.  Do you have difficulty fully emptying your bladder?   yes   10.  Do you experience accidental leakage when  performing some physical activity such as coughing, sneezing, laughing or exercise?   yes   11. Have you tried medications to help improve your symptoms?   yes   12. Would you be interested in learning about a long-lasting option that may help you with your symptoms?   yes                                                                             Total Score   15     0-7 (Mild) 8-16 (Moderate) 17-28 (Severe)        Objective     Physical Exam:   Vital Signs:   Vitals:    07/30/25 1343   BP: 112/74   Pulse: 84   SpO2: 98%     There is no height or weight on file to calculate BMI.     Physical Exam    Labs:   Brief Urine Lab Results  (Last result in the past 365 days)        Color   Clarity   Blood   Leuk Est   Nitrite   Protein   CREAT   Urine HCG        07/30/25 1346 Yellow   Clear   1+   Negative   Negative   Negative                   Urine Culture          1/28/2025    16:13 6/26/2025    11:15 7/23/2025    15:56   Urine Culture   Urine Culture Final report  Final report  Final report         Lab Results   Component Value Date    GLUCOSE 124 (H) 03/27/2025    CALCIUM 9.7 03/27/2025     03/27/2025    K 4.2 03/27/2025    CO2 22 03/27/2025     03/27/2025    BUN 14 03/27/2025    CREATININE 0.67 03/27/2025    BCR 21 03/27/2025       Lab Results   Component Value Date    WBC 8.57 07/15/2025    HGB 13.2 07/15/2025    HCT 40.2 07/15/2025    MCV 96.6 07/15/2025     07/15/2025       Images:   XR Spine Lumbar Complete With Flex & Ext  Result Date: 4/25/2025  Impression: No radiographic signs of instability. Degenerative change similar to previous lumbar spine MRI. Age advanced intra-abdominal atherosclerotic plaque. Electronically Signed: Marcus Andrea MD  4/25/2025 3:36 PM EDT  Workstation ID: [id number]    XR hip 1 vw bilateral  Result Date: 4/25/2025  Impression: 1.Mild bilateral hip joint degenerative arthritis. 2.Mild left sacroiliac joint degenerative arthritis. Electronically Signed: Mark Murphy MD  " 4/25/2025 3:35 PM EDT  Workstation ID: [id number]      Measures:   Tobacco:   Gina Loomis  reports that she has been smoking cigarettes. She started smoking about 32 years ago. She has a 49.7 pack-year smoking history. She has been exposed to tobacco smoke. She has never used smokeless tobacco. I have educated her on the risk of diseases from using tobacco products such as {Tobacco Cessation Diseases:51901::\"cancer\",\"COPD\",\"heart disease\"}.     I advised her to quit and she is {Willing/Not Willing to Quit Tobacco Products:93048}.    I spent {Time Spent Tobacco :79654} minutes counseling the patient.           Urine Incontinence: (NOUI)  ***     Assessment / Plan      Assessment:  Vladislav is a 45 y.o. who presented today with ***    Today we discussed patients symptoms and recurrent UTI prophylaxis. We discussed beginning Vaginal Estrace cream three times weekly for UTI prophylaxis. We also discussed beginning Cranberry supplements and a daily probiotic.     We discussed the use of D-Mannose daily as well as possibly beginning Hiprex if needed. We discussed the need for long-term antibiotics to sterilize the urinary tract if continues to have recurrent UTIs. Encouraged patient to stay hydrate.      Diagnoses and all orders for this visit:    1. Recurrent UTI (Primary)  -     POC Urinalysis Dipstick, Automated           Follow Up:   No follow-ups on file.    I spent approximately *** minutes providing clinical care for this patient; including review of patient's chart and provider documentation, face to face time spent with patient in examination room (obtaining history, performing physical exam, discussing diagnosis and management options), placing orders, and completing patient documentation.     BENY Meza  [facility] [place]     "

## 2025-07-30 NOTE — PROGRESS NOTES
UTI Office Visit      Patient Name: Gina Loomis  : 1980   MRN: [id number]     Chief Complaint:  UTI   Chief Complaint   Patient presents with    Recurrent UTI    Overactive bladder     Blood in Urine     Microscopic        Referring Provider: No ref. provider found    History of Present Illness: Gina Loomis is a 45 y.o.  who presents today for history of recurrent UTI.  At her last office visit, she was transitioned off nightly Macrobid and was initiated on cranberry supplements.  She has had at least 2 confirmed UTIs since then and she believes she has had 3 UTIs.  She is currently finishing course of cefdinir for E. coli UTI.  She reports continued back pain and urethral burning.    She does have diabetes and last hemoglobin A1c was 6.8.  She is a current every day smoker 1-1/2 packs/day.    Urinalysis with blood. Send for UA micro and culture.     Subjective      Review of System: Review of Systems   Genitourinary:  Positive for dysuria.   Musculoskeletal:  Positive for back pain.   All other systems reviewed and are negative.     I have reviewed the ROS documented by my clinical staff, I have updated appropriately and I agree. BENY Solorzano    Past Medical History:  Past Medical History:   Diagnosis Date    Allergic     Anemia     Anxiety state 2010    Anxiety syndrome     Arthritis 2 4 2017    Asthma     Bell's palsy     twice    Carpal tunnel syndrome     Cervical disc disorder 9 10     Chronic bronchitis     Clotting disorder     CTS (carpal tunnel syndrome)     Depression     Diabetes mellitus     Dizziness     Fibroid     Fracture     GERD (gastroesophageal reflux disease) 2010    Headache, tension-type     HL (hearing loss)     Hyperlipidemia 2013    Hypertension     Hypothyroidism 2013    Iron deficiency anemia secondary to inadequate dietary iron intake 2023    Kidney infection     Low back pain 12 3 2025    Lumbosacral disc disease 9 10      Memory loss     Memory loss     Obesity     Osteoarthritis     Preeclampsia 2004        Shingles     Sleep apnea 05 12 12    Spinal stenosis     Stress fracture 2025    lower spine fracture + torn disc    Thoracic disc disorder 9 10 2011    Thyroid disease     Trigeminal neuralgia     Upper respiratory infection 2018    Urinary incontinence     Vaginal infection     Weakness        Past Surgical History:  Past Surgical History:   Procedure Laterality Date    BLADDER SURGERY  2024    Scope    CARPAL TUNNEL RELEASE Left 2015    CARPAL TUNNEL RELEASE Right 08/15/2008     SECTION      X 2,  and     COLONOSCOPY N/A 2024    Procedure: COLONOSCOPY;  Surgeon: Rnoald Reyes MD;  Location:  [facility];  Service: Gastroenterology;  Laterality: N/A;    COLONOSCOPY  2024    ENDOMETRIAL ABLATION      ENDOSCOPY N/A 2024    Procedure: ESOPHAGOGASTRODUODENOSCOPY;  Surgeon: Ronald Reyes MD;  Location:  [facility];  Service: Gastroenterology;  Laterality: N/A;    EPIDURAL BLOCK      HERNIA REPAIR      incisional on abdomen    HYSTEROSCOPY ENDOMETRIAL ABLATION TUBAL STERILIZATION      LAPAROSCOPIC TUBAL LIGATION      TUBAL ABDOMINAL LIGATION         Medications:    Current Outpatient Medications:     albuterol sulfate  (90 Base) MCG/ACT inhaler, Inhale 2 puffs Every 4 (Four) Hours As Needed for Wheezing., Disp: 18 g, Rfl: 5    carvedilol (COREG) 12.5 MG tablet, Take 1 tablet by mouth 2 (Two) Times a Day., Disp: 180 tablet, Rfl: 3    cefdinir (OMNICEF) 300 MG capsule, Take 1 capsule by mouth 2 (Two) Times a Day., Disp: 14 capsule, Rfl: 0    Cranberry (TheraCran One) 500 MG capsule, Take 500 mg by mouth Daily., Disp: 90 each, Rfl: 1    ferrous gluconate (FERGON) 324 MG tablet, Take 1 tablet by mouth once daily with breakfast, Disp: 30 tablet, Rfl: 2    fluconazole (Diflucan) 150 MG tablet, Take one tablet by oral route QD x  1; may repeat in one week if needed, Disp: 2 tablet, Rfl: 0    FLUoxetine (PROzac) 20 MG capsule, Take 3 capsules by mouth Daily., Disp: 270 capsule, Rfl: 1    gabapentin (NEURONTIN) 300 MG capsule, Take 1 capsule by mouth 2 (Two) Times a Day., Disp: 60 capsule, Rfl: 1    hydrOXYzine (ATARAX) 25 MG tablet, Take one tablet by oral route twice a day, as needed, Disp: 20 tablet, Rfl: 0    levothyroxine (SYNTHROID, LEVOTHROID) 75 MCG tablet, Take 1 tablet by mouth Every Morning., Disp: , Rfl:     losartan (COZAAR) 50 MG tablet, Take 1 tablet by mouth Daily., Disp: 90 tablet, Rfl: 1    meclizine (ANTIVERT) 25 MG tablet, Take 0.5 tablets by mouth 3 (Three) Times a Day As Needed for Dizziness., Disp: 30 tablet, Rfl: 2    metFORMIN (GLUCOPHAGE) 1000 MG tablet, Take 1 tablet by mouth 2 (Two) Times a Day With Meals., Disp: 180 tablet, Rfl: 1    methocarbamol (ROBAXIN) 500 MG tablet, Take 1 tablet by mouth 2 (Two) Times a Day As Needed for Muscle Spasms., Disp: 45 tablet, Rfl: 0    montelukast (SINGULAIR) 10 MG tablet, Take 1 tablet by mouth Every Night., Disp: 90 tablet, Rfl: 1    NIFEdipine XL (PROCARDIA XL) 60 MG 24 hr tablet, Take 1 tablet by mouth Every Evening., Disp: 30 tablet, Rfl: 1    nitroglycerin (NITROSTAT) 0.4 MG SL tablet, 1 under the tongue as needed for angina, may repeat every 5 mins for up three doses.  If no resolution of chest pain, call 911 or head to the nearest emergency room., Disp: 100 tablet, Rfl: 11    norethindrone (AYGESTIN) 5 MG tablet, Take 1 tablet by mouth Daily., Disp: 30 tablet, Rfl: 0    omega-3 acid ethyl esters (LOVAZA) 1 g capsule, Take 2 capsules by mouth 2 (Two) Times a Day., Disp: 360 capsule, Rfl: 3    omeprazole (priLOSEC) 20 MG capsule, Take 1 capsule by mouth 2 (Two) Times a Day., Disp: 180 capsule, Rfl: 1    ondansetron ODT (ZOFRAN-ODT) 4 MG disintegrating tablet, 1 tablet Every 8 (Eight) Hours As Needed for Nausea or Vomiting., Disp: , Rfl:     rosuvastatin (CRESTOR) 10 MG  tablet, Take 1 tablet by mouth Every Night., Disp: 90 tablet, Rfl: 3    solifenacin (VESICARE) 10 MG tablet, Take 1 tablet by mouth once daily, Disp: 90 tablet, Rfl: 0    triamterene-hydrochlorothiazide (MAXZIDE-25) 37.5-25 MG per tablet, Take 0.5 tablets by mouth Daily., Disp: 45 tablet, Rfl: 2    nitrofurantoin, macrocrystal-monohydrate, (Macrobid) 100 MG capsule, Take 1 capsule by mouth Every Night., Disp: 90 capsule, Rfl: 1    Allergies:  Allergies   Allergen Reactions    Lisinopril Cough    Claritin-D 24 Hour [Loratadine-Pseudoephedrine Er] Other (See Comments)     Couldn't sleep    Loratadine Anxiety    Morphine Itching    Pseudoephedrine Anxiety       Social History:  Social History     Socioeconomic History    Marital status:    Tobacco Use    Smoking status: Every Day     Current packs/day: 1.00     Average packs/day: 1.1 packs/day for 44.0 years (49.7 ttl pk-yrs)     Types: Cigarettes     Start date: 1/1/1993     Passive exposure: Current    Smokeless tobacco: Never   Vaping Use    Vaping status: Never Used   Substance and Sexual Activity    Alcohol use: Not Currently    Drug use: Never    Sexual activity: Yes     Partners: Male     Birth control/protection: Tubal ligation       Family History:  Family History   Problem Relation Age of Onset    High cholesterol Father     Heart disease Father     Diabetes Father     Coronary artery disease Father     Kidney failure Father     Hypertension Father     Hyperlipidemia Father     Hypertension Mother     Coronary artery disease Mother     Heart disease Mother     High cholesterol Mother     Asthma Mother     COPD Mother     Diabetes Paternal Grandmother     Lymphoma Maternal Grandmother     Diabetes Maternal Grandmother     Obesity Maternal Grandmother     Diabetes Sister     Kidney cancer Neg Hx         bladder cancer       Bladder & Bowel Symptom Questionnaire    How often do you usually urinate during the day ?   3 - About every 1-2 hours   2.   How  many timed do you urinate at night?   1 - 2 times at night   3.   What is the reason that you usually urinate?   3 - Severe urge (can delay less than 10 min)   4.   Once you get the urge to go, how long can you     comfortably delay?   2 - 10-30 min   5.   How often do you get a sudden urge that makes you rush to the bathroom?   2 - A few times a month   6.   How often does a sudden urge to urinate result in you leaking urine or wetting pads?   2 - A few times a month   7.  In your opinion, how good is your bladder control?   2 - Good   8.  Do you have accidental bowel leakage?   no   9.  Do you have difficulty fully emptying your bladder?   yes   10.  Do you experience accidental leakage when performing some physical activity such as coughing, sneezing, laughing or exercise?   yes   11. Have you tried medications to help improve your symptoms?   yes   12. Would you be interested in learning about a long-lasting option that may help you with your symptoms?   yes                                                                             Total Score   15     0-7 (Mild) 8-16 (Moderate) 17-28 (Severe)        Objective     Physical Exam:   Vital Signs:   Vitals:    07/30/25 1343   BP: 112/74   Pulse: 84   SpO2: 98%     There is no height or weight on file to calculate BMI.     Physical Exam  Vitals and nursing note reviewed.   Constitutional:       Appearance: Normal appearance. She is obese.   HENT:      Head: Normocephalic and atraumatic.      Nose: Nose normal.      Mouth/Throat:      Mouth: Mucous membranes are moist.   Eyes:      Pupils: Pupils are equal, round, and reactive to light.   Pulmonary:      Effort: Pulmonary effort is normal.   Abdominal:      General: Abdomen is flat.      Palpations: Abdomen is soft.   Musculoskeletal:         General: Normal range of motion.      Cervical back: Normal range of motion.   Skin:     General: Skin is warm and dry.      Capillary Refill: Capillary refill takes less than 2  seconds.   Neurological:      General: No focal deficit present.      Mental Status: She is alert.   Psychiatric:         Mood and Affect: Mood normal.         Labs:   Brief Urine Lab Results  (Last result in the past 365 days)        Color   Clarity   Blood   Leuk Est   Nitrite   Protein   CREAT   Urine HCG        07/30/25 1346 Yellow   Clear   1+   Negative   Negative   Negative                   Urine Culture          1/28/2025    16:13 6/26/2025    11:15 7/23/2025    15:56   Urine Culture   Urine Culture Final report  Final report  Final report         Lab Results   Component Value Date    GLUCOSE 124 (H) 03/27/2025    CALCIUM 9.7 03/27/2025     03/27/2025    K 4.2 03/27/2025    CO2 22 03/27/2025     03/27/2025    BUN 14 03/27/2025    CREATININE 0.67 03/27/2025    BCR 21 03/27/2025       Lab Results   Component Value Date    WBC 8.57 07/15/2025    HGB 13.2 07/15/2025    HCT 40.2 07/15/2025    MCV 96.6 07/15/2025     07/15/2025       Images:   XR Spine Lumbar Complete With Flex & Ext  Result Date: 4/25/2025  Impression: No radiographic signs of instability. Degenerative change similar to previous lumbar spine MRI. Age advanced intra-abdominal atherosclerotic plaque. Electronically Signed: Marcus Andrea MD  4/25/2025 3:36 PM EDT  Workstation ID: [id number]    XR hip 1 vw bilateral  Result Date: 4/25/2025  Impression: 1.Mild bilateral hip joint degenerative arthritis. 2.Mild left sacroiliac joint degenerative arthritis. Electronically Signed: Mark Murphy MD  4/25/2025 3:35 PM EDT  Workstation ID: [id number]      Measures:   Tobacco:   Gina Loomis  reports that she has been smoking cigarettes. She started smoking about 32 years ago. She has a 49.7 pack-year smoking history. She has been exposed to tobacco smoke. She has never used smokeless tobacco.     I advised her to quit and she is willing to quit. We have discussed the following method/s for tobacco cessation:      I spent 3  minutes  counseling the patient.           Urine Incontinence: Patient reports that she is not currently experiencing any symptoms of urinary incontinence.      Assessment / Plan      Assessment:  Gina Loomis is a 45 y.o. who presented today with history of recurrent UTI and microscopic hematuria.  We will send urine for UA micro and urine culture.  Once she completes course of cefdinir she will begin nightly Macrobid 100 mg.  Will notify with urine results and she will follow-up in 8 weeks for symptom check.  We did have an in-depth discussion regarding better management of her diabetes as well as smoking cessation.  We discussed her diabetes and her current everyday smoking is only increasing her risk of recurrent UTIs.  She is understanding and agreeable with plan of care.    Diagnoses and all orders for this visit:    1. Recurrent UTI (Primary)  -     POC Urinalysis Dipstick, Automated  -     nitrofurantoin, macrocrystal-monohydrate, (Macrobid) 100 MG capsule; Take 1 capsule by mouth Every Night.  Dispense: 90 capsule; Refill: 1    2. Microscopic hematuria  -     Urinalysis With Microscopic - Urine, Clean Catch; Future    3. Urinary tract infection with hematuria, site unspecified  -     Urine Culture - Urine, Urine, Clean Catch; Future      Follow Up:   Return for 8 weeks.    I spent approximately 20 minutes providing clinical care for this patient; including review of patient's chart and provider documentation, face to face time spent with patient in examination room (obtaining history, performing physical exam, discussing diagnosis and management options), placing orders, and completing patient documentation.     BENY Meza  [facility] [place]

## 2025-07-31 LAB
BACTERIA UR QL AUTO: ABNORMAL /HPF
BILIRUB UR QL STRIP: NEGATIVE
CLARITY UR: CLEAR
COLOR UR: YELLOW
GLUCOSE UR STRIP-MCNC: ABNORMAL MG/DL
HGB UR QL STRIP.AUTO: NEGATIVE
HYALINE CASTS UR QL AUTO: ABNORMAL /LPF
KETONES UR QL STRIP: ABNORMAL
LEUKOCYTE ESTERASE UR QL STRIP.AUTO: NEGATIVE
NITRITE UR QL STRIP: NEGATIVE
PH UR STRIP.AUTO: 6 [PH] (ref 5–8)
PROT UR QL STRIP: ABNORMAL
RBC # UR STRIP: ABNORMAL /HPF
REF LAB TEST METHOD: ABNORMAL
SP GR UR STRIP: 1.02 (ref 1–1.03)
SQUAMOUS #/AREA URNS HPF: ABNORMAL /HPF
UROBILINOGEN UR QL STRIP: ABNORMAL
WBC # UR STRIP: ABNORMAL /HPF

## 2025-08-01 LAB — BACTERIA SPEC AEROBE CULT: NO GROWTH

## 2025-08-05 DIAGNOSIS — N32.81 OAB (OVERACTIVE BLADDER): ICD-10-CM

## 2025-08-05 RX ORDER — SOLIFENACIN SUCCINATE 10 MG/1
10 TABLET, FILM COATED ORAL DAILY
Qty: 90 TABLET | Refills: 0 | Status: SHIPPED | OUTPATIENT
Start: 2025-08-05

## 2025-08-07 ENCOUNTER — TELEPHONE (OUTPATIENT)
Dept: CARDIOLOGY | Facility: CLINIC | Age: 45
End: 2025-08-07
Payer: COMMERCIAL

## 2025-08-07 ENCOUNTER — CLINICAL SUPPORT (OUTPATIENT)
Dept: FAMILY MEDICINE CLINIC | Facility: CLINIC | Age: 45
End: 2025-08-07
Payer: COMMERCIAL

## 2025-08-07 VITALS — DIASTOLIC BLOOD PRESSURE: 60 MMHG | SYSTOLIC BLOOD PRESSURE: 112 MMHG

## 2025-08-08 ENCOUNTER — OFFICE VISIT (OUTPATIENT)
Dept: PAIN MEDICINE | Facility: CLINIC | Age: 45
End: 2025-08-08
Payer: COMMERCIAL

## 2025-08-08 VITALS — BODY MASS INDEX: 50.02 KG/M2 | HEIGHT: 64 IN | WEIGHT: 293 LBS

## 2025-08-08 DIAGNOSIS — Z51.81 THERAPEUTIC DRUG MONITORING: ICD-10-CM

## 2025-08-08 DIAGNOSIS — M54.16 LUMBAR RADICULOPATHY: ICD-10-CM

## 2025-08-08 DIAGNOSIS — G89.4 CHRONIC PAIN SYNDROME: ICD-10-CM

## 2025-08-08 DIAGNOSIS — M79.18 MYOFASCIAL PAIN ON RIGHT SIDE: ICD-10-CM

## 2025-08-08 DIAGNOSIS — M53.3 SACROILIAC JOINT DYSFUNCTION OF LEFT SIDE: ICD-10-CM

## 2025-08-08 DIAGNOSIS — M47.817 LUMBOSACRAL SPONDYLOSIS WITHOUT MYELOPATHY: ICD-10-CM

## 2025-08-08 DIAGNOSIS — M70.71 ISCHIAL BURSITIS OF RIGHT SIDE: Primary | ICD-10-CM

## 2025-08-08 RX ORDER — GABAPENTIN 300 MG/1
300 CAPSULE ORAL 2 TIMES DAILY
Qty: 60 CAPSULE | Refills: 1 | Status: SHIPPED | OUTPATIENT
Start: 2025-08-08

## 2025-08-09 DIAGNOSIS — E11.9 TYPE 2 DIABETES MELLITUS WITHOUT COMPLICATION, WITHOUT LONG-TERM CURRENT USE OF INSULIN: ICD-10-CM

## 2025-08-12 ENCOUNTER — OFFICE VISIT (OUTPATIENT)
Age: 45
End: 2025-08-12
Payer: COMMERCIAL

## 2025-08-12 VITALS
HEIGHT: 64 IN | BODY MASS INDEX: 50.02 KG/M2 | WEIGHT: 293 LBS | DIASTOLIC BLOOD PRESSURE: 82 MMHG | HEART RATE: 80 BPM | OXYGEN SATURATION: 100 % | SYSTOLIC BLOOD PRESSURE: 122 MMHG

## 2025-08-12 DIAGNOSIS — F41.1 GENERALIZED ANXIETY DISORDER: Primary | ICD-10-CM

## 2025-08-21 DIAGNOSIS — E03.9 ACQUIRED HYPOTHYROIDISM: Primary | ICD-10-CM

## 2025-08-21 RX ORDER — LEVOTHYROXINE SODIUM 75 UG/1
75 TABLET ORAL
Qty: 30 TABLET | Refills: 0 | Status: SHIPPED | OUTPATIENT
Start: 2025-08-21

## 2025-08-21 RX ORDER — LEVOTHYROXINE SODIUM 75 UG/1
75 TABLET ORAL
Qty: 90 TABLET | Refills: 0 | Status: SHIPPED | OUTPATIENT
Start: 2025-08-21 | End: 2025-08-22

## 2025-08-22 ENCOUNTER — TELEPHONE (OUTPATIENT)
Dept: CARDIOLOGY | Facility: CLINIC | Age: 45
End: 2025-08-22

## 2025-08-22 ENCOUNTER — OFFICE VISIT (OUTPATIENT)
Dept: CARDIOLOGY | Facility: CLINIC | Age: 45
End: 2025-08-22
Payer: COMMERCIAL

## 2025-08-22 VITALS
OXYGEN SATURATION: 97 % | WEIGHT: 293 LBS | SYSTOLIC BLOOD PRESSURE: 134 MMHG | RESPIRATION RATE: 18 BRPM | HEART RATE: 75 BPM | HEIGHT: 64 IN | DIASTOLIC BLOOD PRESSURE: 80 MMHG | BODY MASS INDEX: 50.02 KG/M2

## 2025-08-22 DIAGNOSIS — I10 ESSENTIAL HYPERTENSION: ICD-10-CM

## 2025-08-22 DIAGNOSIS — Z72.0 TOBACCO USE: ICD-10-CM

## 2025-08-22 DIAGNOSIS — R09.89 LEFT CAROTID BRUIT: ICD-10-CM

## 2025-08-22 DIAGNOSIS — R06.09 DYSPNEA ON EXERTION: Primary | ICD-10-CM

## 2025-08-22 DIAGNOSIS — E78.2 MIXED HYPERLIPIDEMIA: ICD-10-CM

## 2025-08-22 PROCEDURE — 99214 OFFICE O/P EST MOD 30 MIN: CPT | Performed by: INTERNAL MEDICINE

## 2025-08-22 RX ORDER — LOSARTAN POTASSIUM 100 MG/1
100 TABLET ORAL DAILY
Qty: 90 TABLET | Refills: 1 | Status: SHIPPED | OUTPATIENT
Start: 2025-08-22

## 2025-08-22 RX ORDER — TRIAMTERENE AND HYDROCHLOROTHIAZIDE 37.5; 25 MG/1; MG/1
1 TABLET ORAL DAILY
Qty: 90 TABLET | Refills: 2 | Status: SHIPPED | OUTPATIENT
Start: 2025-08-22

## 2025-08-28 ENCOUNTER — DOCUMENTATION (OUTPATIENT)
Dept: PAIN MEDICINE | Facility: CLINIC | Age: 45
End: 2025-08-28

## 2025-08-28 ENCOUNTER — OUTSIDE FACILITY SERVICE (OUTPATIENT)
Dept: PAIN MEDICINE | Facility: CLINIC | Age: 45
End: 2025-08-28
Payer: COMMERCIAL

## 2025-08-29 DIAGNOSIS — K21.9 GASTROESOPHAGEAL REFLUX DISEASE, UNSPECIFIED WHETHER ESOPHAGITIS PRESENT: ICD-10-CM

## 2025-08-29 RX ORDER — OMEPRAZOLE 20 MG/1
20 CAPSULE, DELAYED RELEASE ORAL 2 TIMES DAILY
Qty: 60 CAPSULE | Refills: 0 | Status: SHIPPED | OUTPATIENT
Start: 2025-08-29

## (undated) DEVICE — HYBRID CO2 TUBING/CAP SET FOR OLYMPUS® SCOPES & CO2 SOURCE: Brand: ERBE

## (undated) DEVICE — CONTN GRAD MEAS TRIANG 32OZ BLK

## (undated) DEVICE — FIRST STEP BEDSIDE ADD WATER KIT - RESEALABLE STAND-UP POUCH, ENDOSCOPIC CLEANING PAD - 1 POUCH: Brand: FIRST STEP BEDSIDE ADD WATER KIT - RESEALABLE STAND-UP POUCH, ENDOSCOPIC CLEANIN

## (undated) DEVICE — TUBING, SUCTION, 1/4" X 10', STRAIGHT: Brand: MEDLINE

## (undated) DEVICE — SAFELINER SUCTION CANISTER 1000CC: Brand: DEROYAL

## (undated) DEVICE — SOL IRR H2O BTL 1000ML STRL

## (undated) DEVICE — THE BITE BLOCK MAXI, LATEX FREE STRAP IS USED TO PROTECT THE ENDOSCOPE INSERTION TUBE FROM BEING BITTEN BY THE PATIENT.

## (undated) DEVICE — SINGLE-USE BIOPSY FORCEPS: Brand: RADIAL JAW 4

## (undated) DEVICE — THE SINGLE USE ETRAP – POLYP TRAP IS USED FOR SUCTION RETRIEVAL OF ENDOSCOPICALLY REMOVED POLYPS.: Brand: ETRAP

## (undated) DEVICE — LUBE GEL ENDOGLIDE 1.1OZ

## (undated) DEVICE — INTRO ACCSR BLNT TP

## (undated) DEVICE — SOLIDIFIER LIQ PREMISORB 1500CC

## (undated) DEVICE — LUBE JELLY FOIL PACK 1.4 OZ: Brand: MEDLINE INDUSTRIES, INC.

## (undated) DEVICE — KT ORCA ORCAPOD DISP STRL

## (undated) DEVICE — ADAPT CLN LUM OLYMP AIR/H20

## (undated) DEVICE — SNAR POLYP CAPTIVATOR MICROHEX 13 240CM

## (undated) DEVICE — SYR LUERLOK 50ML